# Patient Record
Sex: FEMALE | Race: WHITE | NOT HISPANIC OR LATINO | Employment: OTHER | URBAN - METROPOLITAN AREA
[De-identification: names, ages, dates, MRNs, and addresses within clinical notes are randomized per-mention and may not be internally consistent; named-entity substitution may affect disease eponyms.]

---

## 2017-01-04 ENCOUNTER — HOSPITAL ENCOUNTER (OUTPATIENT)
Dept: RADIOLOGY | Facility: MEDICAL CENTER | Age: 82
Discharge: HOME | End: 2017-01-04
Payer: MEDICARE

## 2017-01-04 ENCOUNTER — TRANSCRIBE ORDERS (OUTPATIENT)
Dept: ADMINISTRATIVE | Facility: HOSPITAL | Age: 82
End: 2017-01-04

## 2017-01-04 DIAGNOSIS — S79.912A TRAUMA LEFT HIP, INITIAL ENCOUNTER: ICD-10-CM

## 2017-01-04 DIAGNOSIS — R52 PAIN: ICD-10-CM

## 2017-01-04 DIAGNOSIS — S79.912A TRAUMA LEFT HIP, INITIAL ENCOUNTER: Primary | ICD-10-CM

## 2017-01-04 DIAGNOSIS — M89.8X5 PAIN IN FEMUR: ICD-10-CM

## 2017-01-04 PROCEDURE — 73552 X-RAY EXAM OF FEMUR 2/>: CPT

## 2017-01-04 PROCEDURE — 73502 X-RAY EXAM HIP UNI 2-3 VIEWS: CPT

## 2017-01-30 ENCOUNTER — ALLSCRIPTS OFFICE VISIT (OUTPATIENT)
Dept: OTHER | Facility: OTHER | Age: 82
End: 2017-01-30

## 2017-01-30 ENCOUNTER — LAB REQUISITION (OUTPATIENT)
Dept: LAB | Facility: HOSPITAL | Age: 82
End: 2017-01-30
Payer: MEDICARE

## 2017-01-30 ENCOUNTER — GENERIC CONVERSION - ENCOUNTER (OUTPATIENT)
Dept: OTHER | Facility: OTHER | Age: 82
End: 2017-01-30

## 2017-01-30 DIAGNOSIS — N39.0 URINARY TRACT INFECTION: ICD-10-CM

## 2017-01-30 LAB
BILIRUB UR QL STRIP: NEGATIVE
CLARITY UR: NORMAL
COLOR UR: YELLOW
GLUCOSE (HISTORICAL): NEGATIVE
HGB UR QL STRIP.AUTO: NORMAL
KETONES UR STRIP-MCNC: NEGATIVE MG/DL
LEUKOCYTE ESTERASE UR QL STRIP: NORMAL
NITRITE UR QL STRIP: NEGATIVE
PH UR STRIP.AUTO: 5 [PH]
PROT UR STRIP-MCNC: NORMAL MG/DL
SP GR UR STRIP.AUTO: 1.01
UROBILINOGEN UR QL STRIP.AUTO: 0.1

## 2017-01-30 PROCEDURE — 81003 URINALYSIS AUTO W/O SCOPE: CPT | Performed by: OBSTETRICS & GYNECOLOGY

## 2017-01-30 PROCEDURE — 87086 URINE CULTURE/COLONY COUNT: CPT | Performed by: OBSTETRICS & GYNECOLOGY

## 2017-01-31 LAB
BILIRUB UR QL STRIP: NEGATIVE
CLARITY UR: NORMAL
COLOR UR: YELLOW
GLUCOSE UR STRIP-MCNC: NEGATIVE MG/DL
HGB UR QL STRIP.AUTO: NEGATIVE
KETONES UR STRIP-MCNC: NEGATIVE MG/DL
LEUKOCYTE ESTERASE UR QL STRIP: NEGATIVE
NITRITE UR QL STRIP: NEGATIVE
PH UR STRIP.AUTO: 6 [PH] (ref 4.5–8)
PROT UR STRIP-MCNC: NEGATIVE MG/DL
SP GR UR STRIP.AUTO: 1.01 (ref 1–1.03)
UROBILINOGEN UR QL STRIP.AUTO: 0.2 E.U./DL

## 2017-02-01 LAB — BACTERIA UR CULT: NORMAL

## 2017-03-23 ENCOUNTER — ALLSCRIPTS OFFICE VISIT (OUTPATIENT)
Dept: OTHER | Facility: OTHER | Age: 82
End: 2017-03-23

## 2017-03-23 DIAGNOSIS — M85.80 OTHER SPECIFIED DISORDERS OF BONE DENSITY AND STRUCTURE, UNSPECIFIED SITE: ICD-10-CM

## 2017-03-29 ENCOUNTER — HOSPITAL ENCOUNTER (OUTPATIENT)
Dept: BONE DENSITY | Facility: MEDICAL CENTER | Age: 82
Discharge: HOME | End: 2017-03-29
Payer: MEDICARE

## 2017-03-29 DIAGNOSIS — M85.80 SENILE OSTEOPENIA: ICD-10-CM

## 2017-03-29 DIAGNOSIS — M85.80 OTHER SPECIFIED DISORDERS OF BONE DENSITY AND STRUCTURE, UNSPECIFIED SITE: ICD-10-CM

## 2017-03-29 DIAGNOSIS — M85.80 OSTEOPENIA: ICD-10-CM

## 2017-03-29 PROCEDURE — 77080 DXA BONE DENSITY AXIAL: CPT

## 2017-04-17 ENCOUNTER — GENERIC CONVERSION - ENCOUNTER (OUTPATIENT)
Dept: OTHER | Facility: OTHER | Age: 82
End: 2017-04-17

## 2017-08-02 ENCOUNTER — ALLSCRIPTS OFFICE VISIT (OUTPATIENT)
Dept: OTHER | Facility: OTHER | Age: 82
End: 2017-08-02

## 2017-11-02 ENCOUNTER — GENERIC CONVERSION - ENCOUNTER (OUTPATIENT)
Dept: OTHER | Facility: OTHER | Age: 82
End: 2017-11-02

## 2017-11-30 ENCOUNTER — ALLSCRIPTS OFFICE VISIT (OUTPATIENT)
Dept: OTHER | Facility: OTHER | Age: 82
End: 2017-11-30

## 2017-11-30 ENCOUNTER — GENERIC CONVERSION - ENCOUNTER (OUTPATIENT)
Dept: OTHER | Facility: OTHER | Age: 82
End: 2017-11-30

## 2017-11-30 DIAGNOSIS — I10 ESSENTIAL (PRIMARY) HYPERTENSION: ICD-10-CM

## 2017-11-30 DIAGNOSIS — R73.01 IMPAIRED FASTING GLUCOSE: ICD-10-CM

## 2017-11-30 DIAGNOSIS — E53.8 DEFICIENCY OF OTHER SPECIFIED B GROUP VITAMINS (CODE): ICD-10-CM

## 2017-11-30 DIAGNOSIS — E78.5 HYPERLIPIDEMIA: ICD-10-CM

## 2017-11-30 DIAGNOSIS — M17.10 PRIMARY OSTEOARTHRITIS OF ONE KNEE: ICD-10-CM

## 2017-11-30 DIAGNOSIS — E55.9 VITAMIN D DEFICIENCY: ICD-10-CM
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
## 2017-12-18 ENCOUNTER — GENERIC CONVERSION - ENCOUNTER (OUTPATIENT)
Dept: OBGYN CLINIC | Facility: MEDICAL CENTER | Age: 82
End: 2017-12-18

## 2017-12-18 ENCOUNTER — GENERIC CONVERSION - ENCOUNTER (OUTPATIENT)
Dept: FAMILY MEDICINE CLINIC | Facility: CLINIC | Age: 82
End: 2017-12-18

## 2018-01-13 VITALS
BODY MASS INDEX: 28.91 KG/M2 | DIASTOLIC BLOOD PRESSURE: 60 MMHG | SYSTOLIC BLOOD PRESSURE: 134 MMHG | HEIGHT: 62 IN | WEIGHT: 157.13 LBS

## 2018-01-13 NOTE — MISCELLANEOUS
Message  Message Free Text Note Form: called results of recent UA, spoke with       Signatures   Electronically signed by : Rosio Barboza DO; Feb  3 2017 10:04AM EST                       (Author)

## 2018-01-14 VITALS
HEIGHT: 62 IN | SYSTOLIC BLOOD PRESSURE: 122 MMHG | BODY MASS INDEX: 29.83 KG/M2 | WEIGHT: 162.13 LBS | DIASTOLIC BLOOD PRESSURE: 70 MMHG | TEMPERATURE: 97.5 F

## 2018-01-14 VITALS
BODY MASS INDEX: 28 KG/M2 | DIASTOLIC BLOOD PRESSURE: 82 MMHG | HEIGHT: 63 IN | WEIGHT: 158 LBS | SYSTOLIC BLOOD PRESSURE: 122 MMHG

## 2018-01-15 ENCOUNTER — ALLSCRIPTS OFFICE VISIT (OUTPATIENT)
Dept: OTHER | Facility: OTHER | Age: 83
End: 2018-01-15

## 2018-01-15 VITALS
DIASTOLIC BLOOD PRESSURE: 80 MMHG | SYSTOLIC BLOOD PRESSURE: 130 MMHG | WEIGHT: 159 LBS | BODY MASS INDEX: 29.26 KG/M2 | TEMPERATURE: 96.2 F | HEIGHT: 62 IN

## 2018-01-16 NOTE — PROGRESS NOTES
Assessment   1  Sinusitis (473 9) (J32 9)    Plan   Sinusitis    · Azithromycin 250 MG Oral Tablet; TAKE 2 TABLETS ON DAY 1 THEN TAKE 1    TABLET A DAY FOR 4 DAYS   · Follow Up if Not Better Evaluation and Treatment  Follow-up  Status: Complete  Done:    59KYQ3847 01:55PM   · Drink at least 6 glasses of water or juice a day ; Status:Complete;   Done: 25FAA5789    01:55PM    Discussion/Summary   Possible side effects of new medications were reviewed with the patient/guardian today  The treatment plan was reviewed with the patient/guardian  The patient/guardian understands and agrees with the treatment plan      Chief Complaint   Patient is being seen for a cold as stated  Patient c/o of eyes redness and seen her eye Dr this morning  He prescribed drop on this past Saturday  patient is coughing and sinus, postal nasal drip  patient has no other concerns  History of Present Illness   HPI: Pt  is here for sinus congestion, cough and pnd  pt did Have ocular issue and salt Dr Scotty Savage today  Patient placed on tobramycin eyedrops Which were started on Saturday  No fever noted  patient using Claritin daily  patient also using saline rinse  Review of Systems        Constitutional: as noted in HPI       ENT: as noted in HPI  Cardiovascular: no complaints of slow or fast heart rate, no chest pain, no palpitations, no leg claudication or lower extremity edema  Respiratory: as noted in HPI  Breasts: no complaints of breast pain, breast lump or nipple discharge  Gastrointestinal: no complaints of abdominal pain, no constipation, no nausea or diarrhea, no vomiting, no bloody stools  Genitourinary: no complaints of dysuria, no incontinence, no pelvic pain, no dysmenorrhea, no vaginal discharge or abnormal vaginal bleeding  Musculoskeletal: no complaints of arthralgia, no myalgia, no joint swelling or stiffness, no limb pain or swelling        Integumentary: no complaints of skin rash or lesion, no itching or dry skin, no skin wounds  Neurological: no complaints of headache, no confusion, no numbness or tingling, no dizziness or fainting  Active Problems   1  Accidental overdose (977 9,E858 9) (T50 901A)   2  Atrophic vaginitis (627 3) (N95 2)   3  Bacterial infection (041 9) (A49 9)   4  Bilateral leg edema (782 3) (R60 0)   5  Carpal tunnel syndrome of right wrist (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 0) (G56 01)   6  Cellulitis Of The Right Little Finger (681 00)   7  Chronic rhinitis (472 0) (J31 0)   8  Concussion without loss of consciousness, initial encounter (850 0) (S06 0X0A)   9  Dysuria (788 1) (R30 0)   10  Fatigue (780 79) (R53 83)   11  Flu vaccine need (V04 81) (Z23)   12  Genital lesion, female (629 89) (N94 9)   13  Glaucoma (365 9) (H40 9)   14  Head trauma (959 01) (S09 90XA)   15  Hyperlipidemia (272 4) (E78 5)   16  Hypertension (401 9) (I10)   17  Impaired fasting glucose (790 21) (R73 01)   18  Inclusion cyst (706 2) (L72 0)   19  Lower back pain (724 2) (M54 5)   20  Lower extremity edema (782 3) (R60 0)   21  Menopausal disorder (627 9) (N95 9)   22  Myalgia, traumatic (959 9) (T14 8XXA)   23  Nasal Cyst (478 19)   24  Need for pneumococcal vaccination (V03 82) (Z23)   25  Need for prophylactic antibiotic (V58 62) (Z79 2)   26  Need For Prophylactic Antibiotics (V07 8)   27  Need for revaccination (V05 9) (Z23)   28  Osteoarthritis of knee (715 36) (M17 10)   29  Osteopenia (733 90) (M85 80)   30  Sinusitis (473 9) (J32 9)   31  Swelling of labia (625 8) (N94 89)   32  Symptomatic menopausal or female climacteric states (627 2) (N95 1)   33  URI, acute (465 9) (J06 9)   34  Urinary tract infection (599 0) (N39 0)   35  Vaginal irritation (623 9) (N89 8)   36  Verrucous keratosis (702 8) (L82 1)   37  Vitamin B12 deficiency (266 2) (E53 8)   38  Vitamin D deficiency (268 9) (E55 9)    Past Medical History   1  Acute laryngitis (464 00) (J04 0)  Active Problems And Past Medical History Reviewed:     The active problems and past medical history were reviewed and updated today  Family History   Mother    1  No pertinent family history    Social History    · Being A Social Drinker   · Former smoker (J54 73) (N09 477)    Surgical History   1  Need For Prophylactic Antibiotics (V07 8)   2  History of Total Abd Hysterect W/ Bilat Ovary Remov & Enterocele Repair    Current Meds    1  Aspirin 81 MG TABS; TAKE 1 TABLET DAILY; Therapy: 35JAU5936 to (Marcella Crespo) Recorded   2  Azo-Cranberry 450 MG TABS; TAKE AS DIRECTED; Therapy: 56GFC1642 to Recorded   3  Celecoxib 200 MG Oral Capsule; TAKE ONE CAPSULE BY MOUTH DAILY WITH A     MEAL; Therapy: 50ESL2170 to (Evaluate:27Jun2018)  Requested for: 25Oie9704; Last     Rx:29Dec2017 Ordered   4  Centrum Silver Ultra Womens Oral Tablet; TAKE 1 TABLET DAILY; Therapy: 32YUG0529 to Recorded   5  Cephalexin 500 MG Oral Capsule; TAKE 2 CAPSULES 1/2 HOUR PRIOR TO     PROCEDURE TO PREVENT INFECTION; Therapy: 27BMR0912 to (Evaluate:51Ckd6660)  Requested for: 41PEW9755; Last     Rx:30Nov2017 Ordered   6  Claritin 10 MG Oral Capsule; Therapy: 81WSP2865 to Recorded   7  CoQ10 100 MG Oral Capsule; TAKE 1 CAPSULE DAILY as directed; Therapy: 49IFR6814 to Recorded   8  Ezetimibe-Simvastatin 10-20 MG Oral Tablet; take 1 tablet by mouth daily; Therapy: 43ZHF6322 to (Evaluate:30Jun2018)  Requested for: 94PFD9082; Last     Rx:01Jan2018 Ordered   9  Fish Oil 1000 MG Oral Capsule; TAKE 1 CAPSULE DAILY; Therapy: 91UVG5788 to Recorded   10  Lumigan 0 01 % Ophthalmic Solution; INSTILL 1 DROP INTO BOTH EYES ONCE DAILY      IN THE EVENING; Therapy: 00CYY4452 to (Evaluate:08Jan2015) Recorded   11  Metoprolol Succinate ER 50 MG Oral Tablet Extended Release 24 Hour; TAKE 1 TABLET      DAILY; Therapy: 88NVV9340 to (Mir Herrera)  Requested for: 31OKV6124; Last      Rx:05Jan2018 Ordered   12   Metoprolol Succinate ER 50 MG Oral Tablet Extended Release 24 Hour; TAKE 1 TABLET      ONCE DAILY; Therapy: 78VQH6936 to ([employer])  Requested for: 60NUL8295; Last      Rx:05Jan2018 Ordered   13  Metoprolol Succinate ER 50 MG Oral Tablet Extended Release 24 Hour; TAKE 1 TABLET      ONCE DAILY; Therapy: 96IXR7706 to ([employer])  Requested for: 09ODV5830; Last      Rx:05Jan2018 Ordered   14  Pazeo 0 7 % Ophthalmic Solution; INSTILL 1 DROP TO EACH EYE DAILY AS NEEDED; Therapy: 71QIL3451 to Recorded   15  Simbrinza 1-0 2 % Ophthalmic Suspension; 1 DROP 2 DAILY 1 IN AM, 1 IN PM, LEFT      EYE ONLY; Therapy: 67EOC1645 to Recorded   16  Systane Ultra 0 4-0 3 % Ophthalmic Solution; INSTILL 1 DROP INTO EACH EYE DAILY      AS NEEDED; Therapy: 43DAX5068 to Recorded   17  Toprol XL 50 MG Oral Tablet Extended Release 24 Hour; TAKE 1 TABLET ONCE DAILY; Therapy: 20ZEZ7350 to ([employer])  Requested for: 48QXB5459; Last      Rx:05Jan2018 Ordered     The medication list was reviewed and updated today  Allergies   1  Cleocin CAPS   2  Darvocet A500 TABS   3  Penicillins   4  Sulfa Drugs    Vitals    Recorded: 06LCJ7407 01:32PM   Temperature 98 4 F, Tympanic   Heart Rate 82, R Radial   Systolic 557, RLE, Sitting   Diastolic 70, RLE, Sitting   Height 5 ft 5 in   Weight 160 lb    BMI Calculated 26 63   BSA Calculated 1 8   O2 Saturation 96     Physical Exam        Constitutional      General appearance: No acute distress, well appearing and well nourished  Eyes      Conjunctiva and lids: No swelling, erythema or discharge  Pupils and irises: Equal, round and reactive to light  Ears, Nose, Mouth, and Throat      External inspection of ears and nose: Normal        Otoscopic examination: Tympanic membranes translucent with normal light reflex  Canals patent without erythema  Nasal mucosa, septum, and turbinates: Normal without edema or erythema  Oropharynx: Abnormal  -- pnd        Pulmonary      Respiratory effort: No increased work of breathing or signs of respiratory distress  Auscultation of lungs: Clear to auscultation  Cardiovascular      Palpation of heart: Normal PMI, no thrills  Auscultation of heart: Normal rate and rhythm, normal S1 and S2, without murmurs  Examination of extremities for edema and/or varicosities: Normal        Carotid pulses: Normal        Abdomen      Abdomen: Non-tender, no masses  Liver and spleen: No hepatomegaly or splenomegaly  Lymphatic      Palpation of lymph nodes in neck: No lymphadenopathy  Musculoskeletal      Gait and station: Normal        Digits and nails: Normal without clubbing or cyanosis  Inspection/palpation of joints, bones, and muscles: Abnormal  -- Osteoarthritic changes bilateral knees  Skin      Skin and subcutaneous tissue: Normal without rashes or lesions  Neurologic      Cranial nerves: Cranial nerves 2-12 intact  Reflexes: 2+ and symmetric  Sensation: No sensory loss         Psychiatric      Orientation to person, place, and time: Normal        Mood and affect: Normal           Future Appointments      Date/Time Provider Specialty Site   03/22/2018 08:45 AM Yuliana Bird DO [facility] [address] [facility] [place]     Signatures    Electronically signed by : Giulia Martínez DO; Edgardo 15 2018  1:55PM EST                       (Author)

## 2018-01-19 ENCOUNTER — GENERIC CONVERSION - ENCOUNTER (OUTPATIENT)
Dept: OTHER | Facility: OTHER | Age: 83
End: 2018-01-19

## 2018-01-23 VITALS
WEIGHT: 160 LBS | TEMPERATURE: 98.4 F | SYSTOLIC BLOOD PRESSURE: 132 MMHG | OXYGEN SATURATION: 96 % | HEART RATE: 82 BPM | HEIGHT: 65 IN | DIASTOLIC BLOOD PRESSURE: 70 MMHG | BODY MASS INDEX: 26.66 KG/M2

## 2018-01-24 VITALS
HEIGHT: 65 IN | DIASTOLIC BLOOD PRESSURE: 70 MMHG | BODY MASS INDEX: 26.33 KG/M2 | WEIGHT: 158 LBS | SYSTOLIC BLOOD PRESSURE: 126 MMHG | TEMPERATURE: 97.6 F

## 2018-03-07 NOTE — PROGRESS NOTES
History of Present Illness    Revaccination   Vaccine Information: Vaccine(s) Given (names): Pneumovax  Spoke with patient regarding vaccine out of temperature range  Action(s): Pt will be revaccinated  Appointment scheduled: [id number]  Other Information: Pt is aware and will revaccinate at her ov appt on 12/13/16  RG 12/12/16  Revaccination Completed: [id number]  Active Problems    1  Accidental overdose (977 9,E858 9) (T50 901A)   2  Atrophic vaginitis (627 3) (N95 2)   3  Bacterial infection (041 9) (A49 9)   4  Carpal tunnel syndrome of right wrist (354 0) (G56 01)   5  Cellulitis Of The Right Little Finger (681 00)   6  Chronic rhinitis (472 0) (J31 0)   7  Dysuria (788 1) (R30 0)   8  Fatigue (780 79) (R53 83)   9  Flu vaccine need (V04 81) (Z23)   10  Genital lesion, female (629 89) (N94 9)   11  Glaucoma (365 9) (H40 9)   12  Hyperlipidemia (272 4) (E78 5)   13  Hypertension (401 9) (I10)   14  Impaired fasting glucose (790 21) (R73 01)   15  Inclusion cyst (706 2) (L72 0)   16  Lower back pain (724 2) (M54 5)   17  Lower extremity edema (782 3) (R60 0)   18  Menopausal disorder (627 9) (N95 9)   19  Nasal Cyst (478 19)   20  Need for pneumococcal vaccination (V03 82) (Z23)   21  Need For Prophylactic Antibiotics (V07 8)   22  Need for revaccination (V05 9) (Z23)   23  Osteoarthritis of knee (715 36) (M17 9)   24  Osteopenia (733 90) (M85 80)   25  Sinusitis (473 9) (J32 9)   26  Swelling of labia (625 8) (N94 89)   27  Symptomatic menopausal or female climacteric states (627 2) (N95 1)   28  Upper respiratory infection (465 9) (J06 9)   29  Urinary tract infection (599 0) (N39 0)   30  Vaginal irritation (623 9) (N89 8)   31  Verrucous keratosis (702 8) (L82 1)   32  Vitamin B12 deficiency (266 2) (E53 8)   33   Vitamin D deficiency (268 9) (E55 9)    Immunizations  Influenza --- Conway Boas: [id number] Rakers: 29-Sep-2015; Series3: 13-Sep-2016   PCV --- Series1: 02-Jul-2013   PPSV --- Conway Boas: 11-Dec-2014   Td/DT --- Verl Nones: 25-Jan-2013     Current Meds   1  Aspirin 81 MG TABS; TAKE 1 TABLET DAILY   2  Azithromycin 250 MG Oral Tablet; TAKE 2 TABLETS ON DAY 1 THEN TAKE 1 TABLET A   DAY FOR 4 DAYS   3  Azo-Cranberry 450 MG TABS; TAKE AS DIRECTED   4  Benicar HCT 40-12 5 MG Oral Tablet; Take 1 tablet by mouth daily   5  Celecoxib 200 MG Oral Capsule; TAKE ONE CAPSULE BY MOUTH DAILY WITH A MEAL   6  Centrum Silver Ultra Womens Oral Tablet; TAKE 1 TABLET DAILY   7  Claritin 10 MG Oral Capsule   8  CoQ10 100 MG Oral Capsule; TAKE 1 CAPSULE DAILY as directed   9  Fish Oil 1000 MG Oral Capsule; TAKE 1 CAPSULE DAILY   10  Lotrel 5-20 MG Oral Capsule; take one capsule by mouth daily   11  Lumigan 0 01 % Ophthalmic Solution; INSTILL 1 DROP INTO BOTH EYES ONCE DAILY    IN THE EVENING   12  Montelukast Sodium 10 MG Oral Tablet   13  Pazeo 0 7 % Ophthalmic Solution; INSTILL 1 DROP TO EACH EYE DAILY AS NEEDED   14  Premarin 0 3 MG Oral Tablet; TAKE 1 TABLET BY MOUTH ONCE DAILY 5 DAYS PER    WEEK   15  Simbrinza 1-0 2 % Ophthalmic Suspension; INSTILL 1 DROP INTO LT EYE TWICE    DAILY, 5 MINS AFTER ALPHAGAN   16  Systane Ultra 0 4-0 3 % Ophthalmic Solution; INSTILL 1 DROP INTO EACH EYE DAILY    AS NEEDED   17  Tenormin 25 MG Oral Tablet; TAKE THREE TABS DAILY   18  Vytorin 10-20 MG Oral Tablet; take 1 tablet by mouth daily    Allergies    1  Cleocin CAPS   2  Darvocet A500 TABS   3  Penicillins   4   Sulfa Drugs    Future Appointments    Date/Time Provider Specialty Site   12/13/2016 08:15 AM Alexa Jernigan DO Family Medicine  [facility]     Signatures   Electronically signed by : Shelia Marrufo OM; Dec 12 2016  3:32PM EST                       (Author)    Electronically signed by : Jose Gomez DO; Dec 20 2016  9:28AM EST                       (Author)

## 2018-03-13 ENCOUNTER — TRANSCRIBE ORDERS (OUTPATIENT)
Dept: ADMINISTRATIVE | Facility: HOSPITAL | Age: 83
End: 2018-03-13

## 2018-03-13 ENCOUNTER — APPOINTMENT (OUTPATIENT)
Dept: LAB | Facility: MEDICAL CENTER | Age: 83
End: 2018-03-13
Payer: MEDICARE

## 2018-03-13 DIAGNOSIS — E78.5 HYPERLIPIDEMIA: ICD-10-CM

## 2018-03-13 DIAGNOSIS — E55.9 VITAMIN D DEFICIENCY: ICD-10-CM

## 2018-03-13 DIAGNOSIS — I10 ESSENTIAL (PRIMARY) HYPERTENSION: ICD-10-CM

## 2018-03-13 DIAGNOSIS — E53.8 DEFICIENCY OF OTHER SPECIFIED B GROUP VITAMINS (CODE): ICD-10-CM

## 2018-03-13 DIAGNOSIS — R73.01 IMPAIRED FASTING GLUCOSE: ICD-10-CM

## 2018-03-13 DIAGNOSIS — M17.10 PRIMARY OSTEOARTHRITIS OF ONE KNEE: ICD-10-CM

## 2018-03-13 LAB
25(OH)D3 SERPL-MCNC: 31.7 NG/ML (ref 30–100)
ALBUMIN SERPL BCP-MCNC: 3.8 G/DL (ref 3.5–5)
ALP SERPL-CCNC: 61 U/L (ref 46–116)
ALT SERPL W P-5'-P-CCNC: 43 U/L (ref 12–78)
ANION GAP SERPL CALCULATED.3IONS-SCNC: 8 MMOL/L (ref 4–13)
AST SERPL W P-5'-P-CCNC: 33 U/L (ref 5–45)
BASOPHILS # BLD AUTO: 0.02 THOUSANDS/ΜL (ref 0–0.1)
BASOPHILS NFR BLD AUTO: 0 % (ref 0–1)
BILIRUB SERPL-MCNC: 0.67 MG/DL (ref 0.2–1)
BUN SERPL-MCNC: 29 MG/DL (ref 5–25)
CALCIUM SERPL-MCNC: 9.2 MG/DL (ref 8.3–10.1)
CHLORIDE SERPL-SCNC: 101 MMOL/L (ref 100–108)
CHOLEST SERPL-MCNC: 116 MG/DL (ref 50–200)
CO2 SERPL-SCNC: 28 MMOL/L (ref 21–32)
CREAT SERPL-MCNC: 0.96 MG/DL (ref 0.6–1.3)
CREAT UR-MCNC: 140 MG/DL
EOSINOPHIL # BLD AUTO: 0.16 THOUSAND/ΜL (ref 0–0.61)
EOSINOPHIL NFR BLD AUTO: 3 % (ref 0–6)
ERYTHROCYTE [DISTWIDTH] IN BLOOD BY AUTOMATED COUNT: 12.9 % (ref 11.6–15.1)
EST. AVERAGE GLUCOSE BLD GHB EST-MCNC: 137 MG/DL
GFR SERPL CREATININE-BSD FRML MDRD: 56 ML/MIN/1.73SQ M
GLUCOSE P FAST SERPL-MCNC: 139 MG/DL (ref 65–99)
HBA1C MFR BLD: 6.4 % (ref 4.2–6.3)
HCT VFR BLD AUTO: 37.6 % (ref 34.8–46.1)
HDLC SERPL-MCNC: 45 MG/DL (ref 40–60)
HGB BLD-MCNC: 12.5 G/DL (ref 11.5–15.4)
LDLC SERPL CALC-MCNC: 38 MG/DL (ref 0–100)
LYMPHOCYTES # BLD AUTO: 1.64 THOUSANDS/ΜL (ref 0.6–4.47)
LYMPHOCYTES NFR BLD AUTO: 29 % (ref 14–44)
MCH RBC QN AUTO: 30.4 PG (ref 26.8–34.3)
MCHC RBC AUTO-ENTMCNC: 33.2 G/DL (ref 31.4–37.4)
MCV RBC AUTO: 92 FL (ref 82–98)
MICROALBUMIN UR-MCNC: 8.5 MG/L (ref 0–20)
MICROALBUMIN/CREAT 24H UR: 6 MG/G CREATININE (ref 0–30)
MONOCYTES # BLD AUTO: 0.59 THOUSAND/ΜL (ref 0.17–1.22)
MONOCYTES NFR BLD AUTO: 11 % (ref 4–12)
NEUTROPHILS # BLD AUTO: 3.19 THOUSANDS/ΜL (ref 1.85–7.62)
NEUTS SEG NFR BLD AUTO: 57 % (ref 43–75)
NRBC BLD AUTO-RTO: 0 /100 WBCS
PLATELET # BLD AUTO: 202 THOUSANDS/UL (ref 149–390)
PMV BLD AUTO: 12.8 FL (ref 8.9–12.7)
POTASSIUM SERPL-SCNC: 3.9 MMOL/L (ref 3.5–5.3)
PROT SERPL-MCNC: 7 G/DL (ref 6.4–8.2)
RBC # BLD AUTO: 4.11 MILLION/UL (ref 3.81–5.12)
SODIUM SERPL-SCNC: 137 MMOL/L (ref 136–145)
T4 FREE SERPL-MCNC: 1.04 NG/DL (ref 0.76–1.46)
TRIGL SERPL-MCNC: 167 MG/DL
TSH SERPL DL<=0.05 MIU/L-ACNC: 5.51 UIU/ML (ref 0.36–3.74)
VIT B12 SERPL-MCNC: 835 PG/ML (ref 100–900)
WBC # BLD AUTO: 5.63 THOUSAND/UL (ref 4.31–10.16)

## 2018-03-13 PROCEDURE — 80061 LIPID PANEL: CPT

## 2018-03-13 PROCEDURE — 84439 ASSAY OF FREE THYROXINE: CPT

## 2018-03-13 PROCEDURE — 82043 UR ALBUMIN QUANTITATIVE: CPT

## 2018-03-13 PROCEDURE — 85025 COMPLETE CBC W/AUTO DIFF WBC: CPT

## 2018-03-13 PROCEDURE — 82607 VITAMIN B-12: CPT

## 2018-03-13 PROCEDURE — 80053 COMPREHEN METABOLIC PANEL: CPT

## 2018-03-13 PROCEDURE — 36415 COLL VENOUS BLD VENIPUNCTURE: CPT

## 2018-03-13 PROCEDURE — 82306 VITAMIN D 25 HYDROXY: CPT

## 2018-03-13 PROCEDURE — 84443 ASSAY THYROID STIM HORMONE: CPT

## 2018-03-13 PROCEDURE — 82570 ASSAY OF URINE CREATININE: CPT

## 2018-03-13 PROCEDURE — 83036 HEMOGLOBIN GLYCOSYLATED A1C: CPT

## 2018-03-14 ENCOUNTER — TELEPHONE (OUTPATIENT)
Dept: FAMILY MEDICINE CLINIC | Facility: CLINIC | Age: 83
End: 2018-03-14

## 2018-03-14 NOTE — TELEPHONE ENCOUNTER
----- Message from Vane Batista DO sent at 3/14/2018 11:49 AM EDT -----  Call patient  Labs reviewed  Patient with impaired fasting glucose the and elevated TSH  Start Synthroid 25 mcg daily in the morning on empty stomach number 30 with 2 refills  Patient off TSH redrawn and 2 months

## 2018-03-15 DIAGNOSIS — R79.89 ABNORMAL TSH: Primary | ICD-10-CM

## 2018-03-15 RX ORDER — CHLORAL HYDRATE 500 MG
1 CAPSULE ORAL DAILY
COMMUNITY
Start: 2014-07-15

## 2018-03-15 RX ORDER — ATENOLOL 25 MG/1
1 TABLET ORAL 3 TIMES DAILY
COMMUNITY
Start: 2012-03-27 | End: 2018-03-20

## 2018-03-15 RX ORDER — OLMESARTAN MEDOXOMIL AND HYDROCHLOROTHIAZIDE 40/12.5 40; 12.5 MG/1; MG/1
1 TABLET ORAL DAILY
COMMUNITY
Start: 2012-03-27 | End: 2018-09-28 | Stop reason: SDUPTHER

## 2018-03-15 RX ORDER — LORATADINE 10 MG/1
CAPSULE, LIQUID FILLED ORAL
COMMUNITY
Start: 2014-05-16

## 2018-03-15 RX ORDER — METOPROLOL SUCCINATE 50 MG/1
1 TABLET, EXTENDED RELEASE ORAL DAILY
COMMUNITY
Start: 2018-01-05 | End: 2018-09-24 | Stop reason: SDUPTHER

## 2018-03-15 RX ORDER — EZETIMIBE AND SIMVASTATIN 10; 20 MG/1; MG/1
1 TABLET ORAL DAILY
COMMUNITY
Start: 2012-03-27 | End: 2018-07-02 | Stop reason: SDUPTHER

## 2018-03-15 RX ORDER — CELECOXIB 200 MG/1
CAPSULE ORAL
COMMUNITY
Start: 2011-06-23 | End: 2018-06-25 | Stop reason: SDUPTHER

## 2018-03-15 RX ORDER — LEVOTHYROXINE SODIUM 0.03 MG/1
25 TABLET ORAL DAILY
Qty: 30 TABLET | Refills: 2 | Status: SHIPPED | OUTPATIENT
Start: 2018-03-15 | End: 2018-06-04 | Stop reason: SDUPTHER

## 2018-03-15 RX ORDER — VITAMIN B COMPLEX
1 TABLET ORAL DAILY
COMMUNITY
Start: 2013-01-08

## 2018-03-15 RX ORDER — AMLODIPINE BESYLATE AND BENAZEPRIL HYDROCHLORIDE 5; 20 MG/1; MG/1
1 CAPSULE ORAL DAILY
COMMUNITY
Start: 2012-03-27 | End: 2018-06-29 | Stop reason: SDUPTHER

## 2018-03-15 NOTE — TELEPHONE ENCOUNTER
----- Message from Thaddeus Shaffer DO sent at 3/14/2018 11:49 AM EDT -----  Call patient  Labs reviewed  Patient with impaired fasting glucose the and elevated TSH  Start Synthroid 25 mcg daily in the morning on empty stomach number 30 with 2 refills  Patient off TSH redrawn and 2 months

## 2018-03-20 ENCOUNTER — OFFICE VISIT (OUTPATIENT)
Dept: FAMILY MEDICINE CLINIC | Facility: CLINIC | Age: 83
End: 2018-03-20
Payer: MEDICARE

## 2018-03-20 VITALS
WEIGHT: 160.4 LBS | SYSTOLIC BLOOD PRESSURE: 130 MMHG | HEIGHT: 55 IN | DIASTOLIC BLOOD PRESSURE: 70 MMHG | BODY MASS INDEX: 37.12 KG/M2

## 2018-03-20 DIAGNOSIS — E03.8 HYPOTHYROIDISM DUE TO HASHIMOTO'S THYROIDITIS: ICD-10-CM

## 2018-03-20 DIAGNOSIS — E06.3 HYPOTHYROIDISM DUE TO HASHIMOTO'S THYROIDITIS: ICD-10-CM

## 2018-03-20 DIAGNOSIS — M85.80 OSTEOPENIA, UNSPECIFIED LOCATION: ICD-10-CM

## 2018-03-20 DIAGNOSIS — E53.8 VITAMIN B12 DEFICIENCY: ICD-10-CM

## 2018-03-20 DIAGNOSIS — I10 ESSENTIAL HYPERTENSION: Primary | ICD-10-CM

## 2018-03-20 DIAGNOSIS — E78.2 MIXED HYPERLIPIDEMIA: ICD-10-CM

## 2018-03-20 DIAGNOSIS — R73.01 IMPAIRED FASTING GLUCOSE: ICD-10-CM

## 2018-03-20 DIAGNOSIS — E55.9 VITAMIN D DEFICIENCY: ICD-10-CM

## 2018-03-20 PROBLEM — R60.0 BILATERAL LEG EDEMA: Status: ACTIVE | Noted: 2017-08-02

## 2018-03-20 PROCEDURE — 99214 OFFICE O/P EST MOD 30 MIN: CPT | Performed by: FAMILY MEDICINE

## 2018-03-20 NOTE — PROGRESS NOTES
Assessment/Plan:   labs   Discussed with patient  Patient will continue with current regimen for all conditions  Patient off TSH redrawn in the next 6 weeks  Patient will continue modified diet regarding impaired fasting glucose  All other medications will be continued for current issues such as hyperlipidemia impaired fasting glucose hypertension  Vitamin B12 and vitamin-D stable overall  No problem-specific Assessment & Plan notes found for this encounter  Diagnoses and all orders for this visit:    Essential hypertension    Mixed hyperlipidemia    Impaired fasting glucose    Osteopenia, unspecified location    Vitamin B12 deficiency    Vitamin D deficiency    Other orders  -     aspirin 81 MG tablet; Take 1 tablet by mouth daily  -     Cranberry 450 MG TABS; Take by mouth  -     celecoxib (CeleBREX) 200 mg capsule; Take by mouth  -     Multiple Vitamins-Minerals (CENTRUM SILVER ULTRA WOMENS PO); Take 1 tablet by mouth daily  -     Loratadine (CLARITIN) 10 MG CAPS; Take by mouth  -     Coenzyme Q10 (COQ10) 100 MG CAPS; Take 1 capsule by mouth daily  -     ezetimibe-simvastatin (VYTORIN) 10-20 mg per tablet; Take 1 tablet by mouth daily  -     Omega-3 Fatty Acids (FISH OIL) 1,000 mg; Take 1 capsule by mouth daily  -     amLODIPine-benazepril (LOTREL) 5-20 MG per capsule; Take 1 capsule by mouth daily  -     bimatoprost (LUMIGAN) 0 01 % ophthalmic drops; Apply 1 drop to eye Daily  -     metoprolol succinate (TOPROL-XL) 50 mg 24 hr tablet; Take 1 tablet by mouth daily  -     olmesartan-hydrochlorothiazide (BENICAR HCT) 40-12 5 MG per tablet; Take 1 tablet by mouth daily  -     Olopatadine HCl (PAZEO) 0 7 % SOLN; Apply to eye  -     conjugated estrogens (PREMARIN) 0 3 mg tablet; Take by mouth  -     Brinzolamide-Brimonidine (SIMBRINZA) 1-0 2 % SUSP; Apply to eye  -     polyethylene glycol-propylene glycol (SYSTANE ULTRA) 0 4-0 3 %; Apply to eye  -     Discontinue: atenolol (TENORMIN) 25 mg tablet;  Take 1 tablet by mouth 3 (three) times a day          Subjective:      Patient ID: Ritu Montgomery is a 82 y o  female  Patient is here for follow-up regarding hypertension, hyperlipidemia, impaired fasting glucose and osteopenia  Patient also status post having laboratory studies with elevated TSH  Patient started on Synthroid recently  Patient had DEXA scan roughly 1 year ago which was reviewed  No significant change with vision  Patient with history of glaucoma  No chest pain or shortness of breath  Normal urination and defecation  Patient with trace lower extremity edema    All other review systems negative        The following portions of the patient's history were reviewed and updated as appropriate: allergies, current medications, past family history, past medical history, past social history, past surgical history and problem list     Review of Systems      Objective:      /70 (BP Location: Left arm, Patient Position: Sitting, Cuff Size: Standard)   Ht 1' 7 81" (0 503 m)   Wt 72 8 kg (160 lb 6 4 oz)   LMP  (LMP Unknown)    31 kg/m²          Physical Exam

## 2018-06-04 ENCOUNTER — APPOINTMENT (OUTPATIENT)
Dept: LAB | Facility: MEDICAL CENTER | Age: 83
End: 2018-06-04
Payer: MEDICARE

## 2018-06-04 DIAGNOSIS — E03.8 HYPOTHYROIDISM DUE TO HASHIMOTO'S THYROIDITIS: ICD-10-CM

## 2018-06-04 DIAGNOSIS — R79.89 ABNORMAL TSH: ICD-10-CM

## 2018-06-04 DIAGNOSIS — E06.3 HYPOTHYROIDISM DUE TO HASHIMOTO'S THYROIDITIS: ICD-10-CM

## 2018-06-04 LAB — TSH SERPL DL<=0.05 MIU/L-ACNC: 3.33 UIU/ML (ref 0.36–3.74)

## 2018-06-04 PROCEDURE — 84443 ASSAY THYROID STIM HORMONE: CPT

## 2018-06-04 PROCEDURE — 36415 COLL VENOUS BLD VENIPUNCTURE: CPT

## 2018-06-04 RX ORDER — LEVOTHYROXINE SODIUM 25 MCG
25 TABLET ORAL DAILY
Qty: 90 TABLET | Refills: 1 | Status: SHIPPED | OUTPATIENT
Start: 2018-06-04 | End: 2018-11-06 | Stop reason: SDUPTHER

## 2018-06-25 DIAGNOSIS — M19.90 ARTHRITIS: Primary | ICD-10-CM

## 2018-06-29 DIAGNOSIS — I10 ESSENTIAL HYPERTENSION: Primary | ICD-10-CM

## 2018-06-29 RX ORDER — AMLODIPINE BESYLATE/BENAZEPRIL 5 MG-20 MG
CAPSULE ORAL
Qty: 90 CAPSULE | Refills: 1 | Status: SHIPPED | OUTPATIENT
Start: 2018-06-29 | End: 2018-08-26 | Stop reason: SDUPTHER

## 2018-07-02 DIAGNOSIS — E78.2 MIXED HYPERLIPIDEMIA: Primary | ICD-10-CM

## 2018-07-02 RX ORDER — EZETIMIBE/SIMVASTATIN 10 MG-20MG
1 TABLET ORAL DAILY
Qty: 90 TABLET | Refills: 1 | Status: SHIPPED | OUTPATIENT
Start: 2018-07-02 | End: 2018-12-11 | Stop reason: SDUPTHER

## 2018-07-03 ENCOUNTER — OFFICE VISIT (OUTPATIENT)
Dept: FAMILY MEDICINE CLINIC | Facility: CLINIC | Age: 83
End: 2018-07-03
Payer: MEDICARE

## 2018-07-03 VITALS
HEIGHT: 62 IN | RESPIRATION RATE: 16 BRPM | HEART RATE: 87 BPM | WEIGHT: 160 LBS | BODY MASS INDEX: 29.44 KG/M2 | SYSTOLIC BLOOD PRESSURE: 128 MMHG | DIASTOLIC BLOOD PRESSURE: 72 MMHG

## 2018-07-03 DIAGNOSIS — E03.8 HYPOTHYROIDISM DUE TO HASHIMOTO'S THYROIDITIS: ICD-10-CM

## 2018-07-03 DIAGNOSIS — R73.01 IMPAIRED FASTING GLUCOSE: ICD-10-CM

## 2018-07-03 DIAGNOSIS — E78.2 MIXED HYPERLIPIDEMIA: ICD-10-CM

## 2018-07-03 DIAGNOSIS — E06.3 HYPOTHYROIDISM DUE TO HASHIMOTO'S THYROIDITIS: ICD-10-CM

## 2018-07-03 DIAGNOSIS — I10 ESSENTIAL HYPERTENSION: Primary | ICD-10-CM

## 2018-07-03 PROCEDURE — 99214 OFFICE O/P EST MOD 30 MIN: CPT | Performed by: FAMILY MEDICINE

## 2018-07-03 NOTE — PROGRESS NOTES
Assessment/Plan:    Blood pressure stable overall  Hypothyroidism stable  Hyperlipidemia stable  Impaired fasting glucose stable  Meds will be sent in automatically  Labs will be ordered at follow-up  Diagnoses and all orders for this visit:    Essential hypertension    Mixed hyperlipidemia    Hypothyroidism due to Hashimoto's thyroiditis    Impaired fasting glucose    Other orders  -     SHINGRIX 50 MCG SUSR; TO BE ADMINISTERED BY PHARMACIST FOR IMMUNIZATION          Subjective:      Patient ID: Anni Paula is a 82 y o  female  Patient here to follow-up on hypothyroidism hypertension hyperlipidemia  Fasting glucose  Patient without any chest pain shortness of breath headache or dizziness  Normal urination and defecation  Patient does have a skin lesion  This patient has noticed some edema due to heat  All review systems negative  The following portions of the patient's history were reviewed and updated as appropriate: allergies, current medications, past family history, past medical history, past social history, past surgical history and problem list     Review of Systems   Constitutional: Negative  HENT: Negative  Eyes: Negative  Respiratory: Negative  Cardiovascular: Negative  Gastrointestinal: Negative  Endocrine: Negative  Genitourinary: Negative  Musculoskeletal: Negative  Skin: Positive for wound  Allergic/Immunologic: Negative  Neurological: Negative  Hematological: Negative  Psychiatric/Behavioral: Negative  Objective:      /72 (BP Location: Right arm, Patient Position: Sitting, Cuff Size: Adult)   Pulse 87   Resp 16   Ht 5' 2" (1 575 m)   Wt 72 6 kg (160 lb)   LMP  (LMP Unknown)   BMI 29 26 kg/m²          Physical Exam   Constitutional: She is oriented to person, place, and time  She appears well-developed and well-nourished  No distress  HENT:   Head: Normocephalic     Right Ear: External ear normal    Left Ear: External ear normal    Mouth/Throat: Oropharynx is clear and moist  No oropharyngeal exudate  Eyes: EOM are normal  Pupils are equal, round, and reactive to light  Right eye exhibits no discharge  Left eye exhibits no discharge  No scleral icterus  Neck: Normal range of motion  Neck supple  No thyromegaly present  Cardiovascular: Normal rate, regular rhythm, normal heart sounds and intact distal pulses  Exam reveals no gallop and no friction rub  No murmur heard  Pulmonary/Chest: Effort normal and breath sounds normal  No respiratory distress  She has no wheezes  She has no rales  She exhibits no tenderness  Abdominal: Soft  Bowel sounds are normal  She exhibits no distension  There is no tenderness  There is no rebound and no guarding  Musculoskeletal: Normal range of motion  She exhibits edema  She exhibits no tenderness  The trace lower extremity edema  Lymphadenopathy:     She has no cervical adenopathy  Neurological: She is oriented to person, place, and time  No cranial nerve deficit  She exhibits normal muscle tone  Coordination normal    Skin: Skin is warm and dry  No rash noted  She is not diaphoretic  No erythema  No pallor  Psychiatric: She has a normal mood and affect  Her behavior is normal  Judgment and thought content normal    Nursing note and vitals reviewed

## 2018-08-26 DIAGNOSIS — I10 ESSENTIAL HYPERTENSION: ICD-10-CM

## 2018-08-27 RX ORDER — AMLODIPINE BESYLATE/BENAZEPRIL 5 MG-20 MG
CAPSULE ORAL
Qty: 90 CAPSULE | Refills: 1 | Status: SHIPPED | OUTPATIENT
Start: 2018-08-27 | End: 2019-03-22 | Stop reason: SDUPTHER

## 2018-08-31 DIAGNOSIS — R23.2 HOT FLASHES: Primary | ICD-10-CM

## 2018-08-31 RX ORDER — CONJUGATED ESTROGENS 0.3 MG/1
TABLET, FILM COATED ORAL
Qty: 100 TABLET | Refills: 0 | Status: SHIPPED | OUTPATIENT
Start: 2018-08-31 | End: 2018-11-23 | Stop reason: SDUPTHER

## 2018-09-24 DIAGNOSIS — I10 ESSENTIAL HYPERTENSION: Primary | ICD-10-CM

## 2018-09-24 RX ORDER — METOPROLOL SUCCINATE 50 MG
TABLET, EXTENDED RELEASE 24 HR ORAL
Qty: 90 TABLET | Refills: 1 | Status: SHIPPED | OUTPATIENT
Start: 2018-09-24 | End: 2019-03-15 | Stop reason: SDUPTHER

## 2018-09-28 DIAGNOSIS — I10 ESSENTIAL HYPERTENSION: Primary | ICD-10-CM

## 2018-10-02 RX ORDER — OLMESARTAN/HYDROCHLOROTHIAZIDE 40-12.5 MG
1 TABLET ORAL DAILY
Qty: 90 TABLET | Refills: 0 | Status: SHIPPED | OUTPATIENT
Start: 2018-10-02 | End: 2019-04-15 | Stop reason: SDUPTHER

## 2018-11-06 DIAGNOSIS — R79.89 ABNORMAL TSH: ICD-10-CM

## 2018-11-06 RX ORDER — LEVOTHYROXINE SODIUM 25 MCG
TABLET ORAL
Qty: 90 TABLET | Refills: 1 | Status: SHIPPED | OUTPATIENT
Start: 2018-11-06 | End: 2019-05-02 | Stop reason: SDUPTHER

## 2018-11-23 DIAGNOSIS — R23.2 HOT FLASHES: ICD-10-CM

## 2018-11-23 RX ORDER — CONJUGATED ESTROGENS 0.3 MG/1
TABLET, FILM COATED ORAL
Qty: 100 TABLET | Refills: 0 | Status: SHIPPED | OUTPATIENT
Start: 2018-11-23 | End: 2019-02-17 | Stop reason: SDUPTHER

## 2018-11-26 ENCOUNTER — OFFICE VISIT (OUTPATIENT)
Dept: FAMILY MEDICINE CLINIC | Facility: CLINIC | Age: 83
End: 2018-11-26
Payer: MEDICARE

## 2018-11-26 VITALS
TEMPERATURE: 97.7 F | DIASTOLIC BLOOD PRESSURE: 78 MMHG | SYSTOLIC BLOOD PRESSURE: 130 MMHG | HEIGHT: 62 IN | BODY MASS INDEX: 28.71 KG/M2 | WEIGHT: 156 LBS

## 2018-11-26 DIAGNOSIS — I10 ESSENTIAL HYPERTENSION: ICD-10-CM

## 2018-11-26 DIAGNOSIS — E78.2 MIXED HYPERLIPIDEMIA: ICD-10-CM

## 2018-11-26 DIAGNOSIS — E03.8 HYPOTHYROIDISM DUE TO HASHIMOTO'S THYROIDITIS: Primary | ICD-10-CM

## 2018-11-26 DIAGNOSIS — E06.3 HYPOTHYROIDISM DUE TO HASHIMOTO'S THYROIDITIS: Primary | ICD-10-CM

## 2018-11-26 DIAGNOSIS — R73.01 IMPAIRED FASTING GLUCOSE: ICD-10-CM

## 2018-11-26 PROCEDURE — 99214 OFFICE O/P EST MOD 30 MIN: CPT | Performed by: FAMILY MEDICINE

## 2018-11-26 NOTE — PROGRESS NOTES
Assessment/Plan:  Patient up-to-date with showing Maldonado and flu shot  A chronic conditions such as hypothyroidism hypertension hyperlipidemia and impaired fasting glucose stable at this time  Patient did lose weight  Patient will continue modify diet  Follow-up in 4 months  Refills will be given as needed  Diagnoses and all orders for this visit:    Hypothyroidism due to Hashimoto's thyroiditis    Impaired fasting glucose  -     CBC and differential; Future  -     Comprehensive metabolic panel; Future  -     Hemoglobin A1C; Future  -     Lipid panel; Future  -     Microalbumin / creatinine urine ratio  -     TSH, 3rd generation with Free T4 reflex; Future    Essential hypertension    Mixed hyperlipidemia          Subjective:      Patient ID: Scout Humphreys is a 82 y o  female  Patient follow-up on impaired fasting glucose hypertension hyperlipidemia hypothyroidism  Patient feeling well this time without any significant complaints other than having ongoing chronic knee pain  No edema chest pain shortness of breath or problems urinating or defecating  The patient up-to-date with flu shot  All review systems negative        The following portions of the patient's history were reviewed and updated as appropriate: allergies, current medications, past family history, past medical history, past social history, past surgical history and problem list     Review of Systems   Constitutional: Negative  HENT: Negative  Eyes: Negative  Respiratory: Negative  Cardiovascular: Negative  Gastrointestinal: Negative  Endocrine: Negative  Genitourinary: Negative  Musculoskeletal: Positive for arthralgias and gait problem  Skin: Negative  Allergic/Immunologic: Negative  Hematological: Negative  Psychiatric/Behavioral: Negative            Objective:      /78 (BP Location: Right arm, Patient Position: Sitting, Cuff Size: Standard)   Temp 97 7 °F (36 5 °C) (Tympanic)   Ht 5' 2" (1 575 m)   Wt 70 8 kg (156 lb)   LMP  (LMP Unknown)   BMI 28 53 kg/m²          Physical Exam   Constitutional: She is oriented to person, place, and time  She appears well-developed and well-nourished  No distress  HENT:   Head: Normocephalic  Right Ear: External ear normal    Left Ear: External ear normal    Mouth/Throat: Oropharynx is clear and moist  No oropharyngeal exudate  Eyes: Pupils are equal, round, and reactive to light  EOM are normal  Right eye exhibits no discharge  Left eye exhibits no discharge  No scleral icterus  Neck: Normal range of motion  Neck supple  No thyromegaly present  Cardiovascular: Normal rate, regular rhythm, normal heart sounds and intact distal pulses  Exam reveals no gallop and no friction rub  No murmur heard  Pulmonary/Chest: Effort normal and breath sounds normal  No respiratory distress  She has no wheezes  She has no rales  She exhibits no tenderness  Abdominal: Soft  Bowel sounds are normal  She exhibits no distension  There is no tenderness  There is no rebound and no guarding  Musculoskeletal: Normal range of motion  She exhibits edema and tenderness  Trace edema bilateral lower extremities   Lymphadenopathy:     She has no cervical adenopathy  Neurological: She is oriented to person, place, and time  No cranial nerve deficit  She exhibits normal muscle tone  Coordination normal    Skin: Skin is warm and dry  No rash noted  She is not diaphoretic  No erythema  No pallor  Psychiatric: She has a normal mood and affect  Her behavior is normal  Judgment and thought content normal    Nursing note and vitals reviewed

## 2018-12-11 DIAGNOSIS — E78.2 MIXED HYPERLIPIDEMIA: ICD-10-CM

## 2018-12-11 RX ORDER — EZETIMIBE/SIMVASTATIN 10 MG-20MG
TABLET ORAL
Qty: 90 TABLET | Refills: 1 | Status: SHIPPED | OUTPATIENT
Start: 2018-12-11 | End: 2019-06-10 | Stop reason: SDUPTHER

## 2018-12-18 DIAGNOSIS — M19.90 ARTHRITIS: ICD-10-CM

## 2019-01-25 ENCOUNTER — OFFICE VISIT (OUTPATIENT)
Dept: FAMILY MEDICINE CLINIC | Facility: CLINIC | Age: 84
End: 2019-01-25
Payer: MEDICARE

## 2019-01-25 VITALS
HEART RATE: 89 BPM | SYSTOLIC BLOOD PRESSURE: 138 MMHG | DIASTOLIC BLOOD PRESSURE: 64 MMHG | BODY MASS INDEX: 29.19 KG/M2 | WEIGHT: 158.6 LBS | TEMPERATURE: 97.9 F | HEIGHT: 62 IN

## 2019-01-25 DIAGNOSIS — J01.00 ACUTE NON-RECURRENT MAXILLARY SINUSITIS: Primary | ICD-10-CM

## 2019-01-25 PROCEDURE — 99213 OFFICE O/P EST LOW 20 MIN: CPT | Performed by: FAMILY MEDICINE

## 2019-01-25 RX ORDER — LEVOFLOXACIN 500 MG/1
500 TABLET, FILM COATED ORAL EVERY 24 HOURS
Qty: 7 TABLET | Refills: 0 | Status: SHIPPED | OUTPATIENT
Start: 2019-01-25 | End: 2019-02-01

## 2019-01-25 NOTE — PROGRESS NOTES
Assessment/Plan:     Diagnoses and all orders for this visit:    Acute non-recurrent maxillary sinusitis  -     levofloxacin (LEVAQUIN) 500 mg tablet; Take 1 tablet (500 mg total) by mouth every 24 hours for 7 days          Subjective:      Patient ID: Arjun Mcqueen is a 82 y o  female  The patient is here with jaw pain left for 3 weeks  Patient did see dentist   Patient with nasal congestion and sore throat and using salt water gargles  Patient also with nasal congestion and nasal discharge on the left  Patient also with maxillary sinus pain on the left  Patient exposed to sick contacts  The possible fever yesterday  Patient with some postnasal drip and cough also  No nausea vomiting or diarrhea  Patient did use Claritin  Sinusitis   Associated symptoms include congestion, coughing, ear pain, sinus pressure and a sore throat  Sore Throat    Associated symptoms include congestion, coughing and ear pain  Cough   Associated symptoms include ear pain, a fever, postnasal drip and a sore throat  The following portions of the patient's history were reviewed and updated as appropriate: allergies, current medications, past family history, past medical history, past social history, past surgical history and problem list     Review of Systems   Constitutional: Positive for fever  HENT: Positive for congestion, ear pain, postnasal drip, sinus pain, sinus pressure and sore throat  Eyes: Negative  Respiratory: Positive for cough  Cardiovascular: Negative  Gastrointestinal: Negative  Endocrine: Negative  Genitourinary: Negative  Musculoskeletal: Negative  Skin: Negative  Allergic/Immunologic: Negative  Neurological: Negative  Hematological: Negative  Psychiatric/Behavioral: Negative            Objective:      /64 (BP Location: Right arm, Patient Position: Sitting, Cuff Size: Large)   Pulse 89   Temp 97 9 °F (36 6 °C) (Tympanic)   Ht 5' 2" (1 575 m)   Wt 71 9 kg (158 lb 9 6 oz)   LMP  (LMP Unknown)   BMI 29 01 kg/m²          Physical Exam   Constitutional: She is oriented to person, place, and time  She appears well-developed and well-nourished  No distress  HENT:   Head: Normocephalic  Right Ear: External ear normal    Left Ear: External ear normal    Mouth/Throat: Oropharyngeal exudate present  Eyes: Pupils are equal, round, and reactive to light  EOM are normal  Right eye exhibits no discharge  Left eye exhibits no discharge  No scleral icterus  Neck: Normal range of motion  Neck supple  No thyromegaly present  Cardiovascular: Normal rate, regular rhythm, normal heart sounds and intact distal pulses  Exam reveals no gallop and no friction rub  No murmur heard  Pulmonary/Chest: Effort normal and breath sounds normal  No respiratory distress  She has no wheezes  She has no rales  She exhibits no tenderness  Musculoskeletal: Normal range of motion  She exhibits no edema or tenderness  Lymphadenopathy:     She has no cervical adenopathy  Neurological: She is oriented to person, place, and time  No cranial nerve deficit  She exhibits normal muscle tone  Coordination normal    Skin: Skin is warm and dry  No rash noted  She is not diaphoretic  No erythema  No pallor  Psychiatric: She has a normal mood and affect  Her behavior is normal  Judgment and thought content normal    Nursing note and vitals reviewed

## 2019-02-17 DIAGNOSIS — R23.2 HOT FLASHES: ICD-10-CM

## 2019-02-18 RX ORDER — CONJUGATED ESTROGENS 0.3 MG/1
TABLET, FILM COATED ORAL
Qty: 100 TABLET | Refills: 0 | Status: SHIPPED | OUTPATIENT
Start: 2019-02-18 | End: 2019-06-21 | Stop reason: SDUPTHER

## 2019-03-15 DIAGNOSIS — I10 ESSENTIAL HYPERTENSION: ICD-10-CM

## 2019-03-15 RX ORDER — METOPROLOL SUCCINATE 50 MG
TABLET, EXTENDED RELEASE 24 HR ORAL
Qty: 90 TABLET | Refills: 1 | Status: SHIPPED | OUTPATIENT
Start: 2019-03-15 | End: 2019-10-04 | Stop reason: SDUPTHER

## 2019-03-21 ENCOUNTER — APPOINTMENT (OUTPATIENT)
Dept: LAB | Facility: MEDICAL CENTER | Age: 84
End: 2019-03-21
Payer: MEDICARE

## 2019-03-21 DIAGNOSIS — R73.01 IMPAIRED FASTING GLUCOSE: ICD-10-CM

## 2019-03-21 LAB
ALBUMIN SERPL BCP-MCNC: 3.7 G/DL (ref 3.5–5)
ALP SERPL-CCNC: 60 U/L (ref 46–116)
ALT SERPL W P-5'-P-CCNC: 34 U/L (ref 12–78)
ANION GAP SERPL CALCULATED.3IONS-SCNC: 6 MMOL/L (ref 4–13)
AST SERPL W P-5'-P-CCNC: 29 U/L (ref 5–45)
BASOPHILS # BLD AUTO: 0.02 THOUSANDS/ΜL (ref 0–0.1)
BASOPHILS NFR BLD AUTO: 0 % (ref 0–1)
BILIRUB SERPL-MCNC: 0.56 MG/DL (ref 0.2–1)
BUN SERPL-MCNC: 27 MG/DL (ref 5–25)
CALCIUM SERPL-MCNC: 9.1 MG/DL (ref 8.3–10.1)
CHLORIDE SERPL-SCNC: 103 MMOL/L (ref 100–108)
CHOLEST SERPL-MCNC: 115 MG/DL (ref 50–200)
CO2 SERPL-SCNC: 26 MMOL/L (ref 21–32)
CREAT SERPL-MCNC: 1.03 MG/DL (ref 0.6–1.3)
CREAT UR-MCNC: 127 MG/DL
EOSINOPHIL # BLD AUTO: 0.18 THOUSAND/ΜL (ref 0–0.61)
EOSINOPHIL NFR BLD AUTO: 3 % (ref 0–6)
ERYTHROCYTE [DISTWIDTH] IN BLOOD BY AUTOMATED COUNT: 12.9 % (ref 11.6–15.1)
EST. AVERAGE GLUCOSE BLD GHB EST-MCNC: 140 MG/DL
GFR SERPL CREATININE-BSD FRML MDRD: 51 ML/MIN/1.73SQ M
GLUCOSE P FAST SERPL-MCNC: 135 MG/DL (ref 65–99)
HBA1C MFR BLD: 6.5 % (ref 4.2–6.3)
HCT VFR BLD AUTO: 37 % (ref 34.8–46.1)
HDLC SERPL-MCNC: 46 MG/DL (ref 40–60)
HGB BLD-MCNC: 12.2 G/DL (ref 11.5–15.4)
IMM GRANULOCYTES # BLD AUTO: 0.01 THOUSAND/UL (ref 0–0.2)
IMM GRANULOCYTES NFR BLD AUTO: 0 % (ref 0–2)
LDLC SERPL CALC-MCNC: 38 MG/DL (ref 0–100)
LYMPHOCYTES # BLD AUTO: 1.51 THOUSANDS/ΜL (ref 0.6–4.47)
LYMPHOCYTES NFR BLD AUTO: 24 % (ref 14–44)
MCH RBC QN AUTO: 30.7 PG (ref 26.8–34.3)
MCHC RBC AUTO-ENTMCNC: 33 G/DL (ref 31.4–37.4)
MCV RBC AUTO: 93 FL (ref 82–98)
MICROALBUMIN UR-MCNC: 9.7 MG/L (ref 0–20)
MICROALBUMIN/CREAT 24H UR: 8 MG/G CREATININE (ref 0–30)
MONOCYTES # BLD AUTO: 0.79 THOUSAND/ΜL (ref 0.17–1.22)
MONOCYTES NFR BLD AUTO: 13 % (ref 4–12)
NEUTROPHILS # BLD AUTO: 3.69 THOUSANDS/ΜL (ref 1.85–7.62)
NEUTS SEG NFR BLD AUTO: 60 % (ref 43–75)
NONHDLC SERPL-MCNC: 69 MG/DL
NRBC BLD AUTO-RTO: 0 /100 WBCS
PLATELET # BLD AUTO: 197 THOUSANDS/UL (ref 149–390)
PMV BLD AUTO: 13.2 FL (ref 8.9–12.7)
POTASSIUM SERPL-SCNC: 4.3 MMOL/L (ref 3.5–5.3)
PROT SERPL-MCNC: 6.7 G/DL (ref 6.4–8.2)
RBC # BLD AUTO: 3.97 MILLION/UL (ref 3.81–5.12)
SODIUM SERPL-SCNC: 135 MMOL/L (ref 136–145)
TRIGL SERPL-MCNC: 156 MG/DL
TSH SERPL DL<=0.05 MIU/L-ACNC: 3.21 UIU/ML (ref 0.36–3.74)
WBC # BLD AUTO: 6.2 THOUSAND/UL (ref 4.31–10.16)

## 2019-03-21 PROCEDURE — 80061 LIPID PANEL: CPT

## 2019-03-21 PROCEDURE — 82570 ASSAY OF URINE CREATININE: CPT | Performed by: FAMILY MEDICINE

## 2019-03-21 PROCEDURE — 80053 COMPREHEN METABOLIC PANEL: CPT

## 2019-03-21 PROCEDURE — 83036 HEMOGLOBIN GLYCOSYLATED A1C: CPT

## 2019-03-21 PROCEDURE — 36415 COLL VENOUS BLD VENIPUNCTURE: CPT

## 2019-03-21 PROCEDURE — 84443 ASSAY THYROID STIM HORMONE: CPT

## 2019-03-21 PROCEDURE — 85025 COMPLETE CBC W/AUTO DIFF WBC: CPT

## 2019-03-21 PROCEDURE — 82043 UR ALBUMIN QUANTITATIVE: CPT | Performed by: FAMILY MEDICINE

## 2019-03-22 DIAGNOSIS — I10 ESSENTIAL HYPERTENSION: ICD-10-CM

## 2019-03-22 RX ORDER — AMLODIPINE BESYLATE/BENAZEPRIL 5 MG-20 MG
CAPSULE ORAL
Qty: 90 CAPSULE | Refills: 1 | Status: SHIPPED | OUTPATIENT
Start: 2019-03-22 | End: 2019-10-04 | Stop reason: SDUPTHER

## 2019-03-26 ENCOUNTER — OFFICE VISIT (OUTPATIENT)
Dept: FAMILY MEDICINE CLINIC | Facility: CLINIC | Age: 84
End: 2019-03-26
Payer: MEDICARE

## 2019-03-26 VITALS
BODY MASS INDEX: 28.52 KG/M2 | WEIGHT: 155 LBS | DIASTOLIC BLOOD PRESSURE: 60 MMHG | TEMPERATURE: 98.3 F | HEIGHT: 62 IN | SYSTOLIC BLOOD PRESSURE: 118 MMHG

## 2019-03-26 DIAGNOSIS — Z00.00 MEDICARE ANNUAL WELLNESS VISIT, SUBSEQUENT: ICD-10-CM

## 2019-03-26 DIAGNOSIS — E66.3 OVERWEIGHT (BMI 25.0-29.9): ICD-10-CM

## 2019-03-26 DIAGNOSIS — I10 ESSENTIAL HYPERTENSION: ICD-10-CM

## 2019-03-26 DIAGNOSIS — E06.3 HYPOTHYROIDISM DUE TO HASHIMOTO'S THYROIDITIS: ICD-10-CM

## 2019-03-26 DIAGNOSIS — E03.8 HYPOTHYROIDISM DUE TO HASHIMOTO'S THYROIDITIS: ICD-10-CM

## 2019-03-26 DIAGNOSIS — E78.2 MIXED HYPERLIPIDEMIA: ICD-10-CM

## 2019-03-26 DIAGNOSIS — E11.9 TYPE 2 DIABETES MELLITUS WITHOUT COMPLICATION, WITHOUT LONG-TERM CURRENT USE OF INSULIN (HCC): Primary | ICD-10-CM

## 2019-03-26 DIAGNOSIS — Z23 ENCOUNTER FOR VACCINATION: ICD-10-CM

## 2019-03-26 PROCEDURE — G0439 PPPS, SUBSEQ VISIT: HCPCS | Performed by: FAMILY MEDICINE

## 2019-03-26 PROCEDURE — 99214 OFFICE O/P EST MOD 30 MIN: CPT | Performed by: FAMILY MEDICINE

## 2019-03-26 NOTE — PROGRESS NOTES
Assessment/Plan:  Pamphlet and information given regarding diabetes  Patient will modify diet  Patient will have A1c rechecked in the office  Patient will check feet routinely  Patient will see ophthalmologist next week  The patient will see dietician  Patient will continue with medications as listed  Refills will be given as needed  Diagnoses and all orders for this visit:    Type 2 diabetes mellitus without complication, without long-term current use of insulin ([facility] [address] )  -     Ambulatory referral to Nutrition Services; Future    Encounter for vaccination  -     PNEUMOCOCCAL CONJUGATE VACCINE 13-VALENT GREATER THAN 6 MONTHS    Essential hypertension    Hypothyroidism due to Hashimoto's thyroiditis    Mixed hyperlipidemia    Medicare annual wellness visit, subsequent    Overweight (BMI 25 0-29  9)          Subjective:      Patient ID: Fred Dean is a 82 y o  female  Patient follow-up on diabetes hypertension hyperlipidemia hypothyroidism  Patient had laboratory studies done which were reviewed with the patient  Patient's A1c 6 5  LDL controlled  Patient with chronic knee pain but otherwise feels well      The following portions of the patient's history were reviewed and updated as appropriate: allergies, current medications, past family history, past medical history, past social history, past surgical history and problem list     Review of Systems   Constitutional: Negative  HENT: Negative  Eyes: Negative  Respiratory: Negative  Cardiovascular: Negative  Gastrointestinal: Negative  Endocrine: Negative  Genitourinary: Negative  Musculoskeletal: Positive for arthralgias  Skin: Negative  Allergic/Immunologic: Negative  Neurological: Negative  Hematological: Negative  Psychiatric/Behavioral: Negative            Objective:      /60 (BP Location: Right arm, Patient Position: Sitting, Cuff Size: Adult)   Temp 98 3 °F (36 8 °C) (Tympanic)   Ht 5' 1 5" (1 562 m) Wt 70 3 kg (155 lb)   LMP  (LMP Unknown)   BMI 28 81 kg/m²          Physical Exam   Constitutional: She is oriented to person, place, and time  She appears well-developed and well-nourished  No distress  HENT:   Head: Normocephalic  Right Ear: External ear normal    Left Ear: External ear normal    Mouth/Throat: Oropharynx is clear and moist  No oropharyngeal exudate  Eyes: Pupils are equal, round, and reactive to light  EOM are normal  Right eye exhibits no discharge  Left eye exhibits no discharge  No scleral icterus  Neck: Normal range of motion  Neck supple  No thyromegaly present  Cardiovascular: Normal rate, regular rhythm, normal heart sounds and intact distal pulses  Exam reveals no gallop and no friction rub  No murmur heard  Pulmonary/Chest: Effort normal and breath sounds normal  No respiratory distress  She has no wheezes  She has no rales  She exhibits no tenderness  Abdominal: Soft  Bowel sounds are normal  She exhibits no distension  There is no tenderness  There is no rebound and no guarding  Musculoskeletal: Normal range of motion  She exhibits no edema or tenderness  Lymphadenopathy:     She has no cervical adenopathy  Neurological: She is oriented to person, place, and time  No cranial nerve deficit  She exhibits normal muscle tone  Coordination normal    Skin: Skin is warm and dry  No rash noted  She is not diaphoretic  No erythema  No pallor  Psychiatric: She has a normal mood and affect  Her behavior is normal  Judgment and thought content normal    Nursing note and vitals reviewed  BMI Counseling: Body mass index is 28 81 kg/m²  Discussed the patient's BMI with her  The BMI is above average  BMI counseling and education was provided to the patient  Nutrition recommendations include reducing portion sizes

## 2019-03-26 NOTE — PATIENT INSTRUCTIONS

## 2019-03-26 NOTE — PROGRESS NOTES
Assessment and Plan:  Problem List Items Addressed This Visit     None      Visit Diagnoses     Encounter for vaccination    -  Primary    Relevant Orders    PNEUMOCOCCAL CONJUGATE VACCINE 13-VALENT GREATER THAN 6 MONTHS        Health Maintenance Due   Topic Date Due    Medicare Annual Wellness Visit (AWV)  1935    BMI: Followup Plan  02/02/1955    Pneumococcal PPSV23/PCV13 65+ Years / Low and Medium Risk (2 of 2 - PCV13) 12/13/2017         HPI:  Patient Active Problem List   Diagnosis    Bilateral leg edema    Hyperlipidemia    Hypertension    Impaired fasting glucose    Osteopenia    Vitamin B12 deficiency    Vitamin D deficiency    Hypothyroidism due to Hashimoto's thyroiditis    Acute non-recurrent maxillary sinusitis     Past Medical History:   Diagnosis Date    Administration of long-term prophylactic antibiotics 04/29/2014    Need for Prophylactic Antibiotics     Past Surgical History:   Procedure Laterality Date    TOTAL ABDOMINAL HYSTERECTOMY W/ BILATERAL SALPINGOOPHORECTOMY      with Entercele Repair     Family History   Problem Relation Age of Onset    No Known Problems Mother      Social History     Tobacco Use   Smoking Status Former Smoker   Smokeless Tobacco Never Used     Social History     Substance and Sexual Activity   Alcohol Use Yes    Comment: Social drinker      Social History     Substance and Sexual Activity   Drug Use No         Current Outpatient Medications   Medication Sig Dispense Refill    aspirin 81 MG tablet Take 1 tablet by mouth daily      BENICAR HCT 40-12 5 MG per tablet TAKE 1 TABLET BY MOUTH DAILY   90 tablet 0    bimatoprost (LUMIGAN) 0 01 % ophthalmic drops Apply 1 drop to eye Daily      Brinzolamide-Brimonidine (SIMBRINZA) 1-0 2 % SUSP Apply to eye      CELEBREX 200 MG capsule TAKE ONE CAPSULE BY MOUTH DAILY WITH A MEAL 90 capsule 1    Coenzyme Q10 (COQ10) 100 MG CAPS Take 1 capsule by mouth daily      Cranberry 450 MG TABS Take by mouth      Loratadine (CLARITIN) 10 MG CAPS Take by mouth      LOTREL 5-20 MG per capsule TAKE 1 CAPSULE BY MOUTH EVERY DAY 90 capsule 1    Multiple Vitamins-Minerals (CENTRUM SILVER ULTRA WOMENS PO) Take 1 tablet by mouth daily      Olopatadine HCl (PAZEO) 0 7 % SOLN Apply to eye      Omega-3 Fatty Acids (FISH OIL) 1,000 mg Take 1 capsule by mouth daily      polyethylene glycol-propylene glycol (SYSTANE ULTRA) 0 4-0 3 % Apply to eye      PREMARIN 0 3 MG tablet TAKE 1 TABLET BY MOUTH ONCE DAILY 5 DAYS PER WEEK 100 tablet 0    SYNTHROID 25 MCG tablet TAKE 1 TABLET BY MOUTH EVERY DAY 90 tablet 1    TOPROL XL 50 MG 24 hr tablet TAKE 1 TABLET BY MOUTH EVERY DAY 90 tablet 1    VYTORIN 10-20 MG per tablet TAKE 1 TABLET BY MOUTH EVERY DAY 90 tablet 1     No current facility-administered medications for this visit  Allergies   Allergen Reactions    Clindamycin      Reaction Date: [id number];     Other     Penicillins     Sulfa Antibiotics      Immunization History   Administered Date(s) Administered    INFLUENZA 09/23/2018    Influenza Split High Dose Preservative Free IM 10/11/2013, 09/29/2015, 09/13/2016, 10/12/2017    Pneumococcal Conjugate PCV 7 07/02/2013    Pneumococcal Polysaccharide PPV23 12/11/2014, 12/13/2016    Td (adult), adsorbed 01/25/2013    Zoster Vaccine Recombinant 06/06/2018, 11/16/2018       Patient Care Team:  Kusum Ford DO as PCP - General  Julia Childs DO    Medicare Screening Tests and Risk Assessments:  Tj Lopez is here for her Subsequent Wellness visit  Health Risk Assessment:  Patient rates overall health as good  Patient feels that their physical health rating is Same  Eyesight was rated as Same  Hearing was rated as Same  Patient feels that their emotional and mental health rating is Same  Pain experienced by patient in the last 7 days has been Some  Patient's pain rating has been 4/10  Patient states that she has experienced no weight loss or gain in last 6 months  Emotional/Mental Health:  Patient has been feeling nervous/anxious  PHQ-9 Depression Screening:    Frequency of the following problems over the past two weeks:      1  Little interest or pleasure in doing things: 0 - not at all      2  Feeling down, depressed, or hopeless: 0 - not at all  PHQ-2 Score: 0          Broken Bones/Falls: Fall Risk Assessment:    In the past year, patient has experienced: No history of falling in past year          Bladder/Bowel:  Patient has leaked urine accidently in the last six months  Patient reports no loss of bowel control  Immunizations:  Patient has had a flu vaccination within the last year  Patient has received a pneumonia shot  Patient has received a shingles shot  Patient has not received tetanus/diphtheria shot  Home Safety:  Patient does not have trouble with stairs inside or outside of their home  Patient currently reports that there are no safety hazards present in home, working smoke alarms, working carbon monoxide detectors  Preventative Screenings:   Breast cancer screening performed, colon cancer screen completed, cholesterol screen completed, glaucoma eye exam completed,     Nutrition:  Current diet: Regular with servings of the following:    Medications:  Patient is currently taking over-the-counter supplements  List of OTC medications includes: Cranberry, Co-Q10, Krill Oil, Vit D  Patient is able to manage medications  Lifestyle Choices:  Patient reports no tobacco use  Patient has smoked or used tobacco in the past   Patient has stopped her tobacco use  Patient reports alcohol use  Patient drives a vehicle  Patient wears seat belt          Activities of Daily Living:  Can get out of bed by his or her self, able to dress self, able to make own meals, able to do own shopping, able to bathe self, can do own laundry/housekeeping, can manage own money, pay bills and track expenses    Previous Hospitalizations:  No hospitalization or ED visit in past 12 months        Advanced Directives:  Patient has decided on a power of   Patient has spoken to designated power of   Patient has completed advanced directive  Preventative Screening/Counseling:      Cardiovascular:      General: Risks and Benefits Discussed and Screening Current          Diabetes:      General: Risks and Benefits Discussed and Screening Current          Colorectal Cancer:      General: Risks and Benefits Discussed and Patient Declines          Breast Cancer:      General: Risks and Benefits Discussed and Screening Current          Cervical Cancer:      General: Risks and Benefits Discussed and Patient Declines          Osteoporosis:      General: Risks and Benefits Discussed and Screening Current      Counseling: Calcium and Vitamin D Intake          AAA:      General: Screening Not Indicated          Glaucoma:      General: Risks and Benefits Discussed and Screening Current          HIV:      General: Risks and Benefits Discussed and Screening Not Indicated          Hepatitis C:      General: Risks and Benefits Discussed and Screening Not Indicated        Advanced Directives:   Patient has living will for healthcare, has durable POA for healthcare, patient has an advanced directive  Immunizations:      Influenza: Risks & Benefits Discussed and Influenza UTD This Year      Pneumococcal: Risks & Benefits Discussed and Lifetime Vaccine Completed      Shingrix: Risks & Benefits Discussed and Shingrix Vaccine UTD      TD: Risks & Benefits Discussed and Td Vaccine UTD      Other Preventative Counseling (Non-Medicare): Fall Prevention and Increase physical activity          No exam data present    Physical Exam:  Review of Systems   Gastrointestinal: Negative for bowel incontinence  Psychiatric/Behavioral: The patient is not nervous/anxious          Vitals:    03/26/19 1507   BP: 118/60   BP Location: Right arm   Patient Position: Sitting   Cuff Size: Adult   Temp: 98 3 °F (36 8 °C)   TempSrc: Tympanic   Weight: 70 3 kg (155 lb)   Height: 5' 1 5" (1 562 m)   Body mass index is 28 81 kg/m²      Physical Exam

## 2019-04-02 LAB
LEFT EYE DIABETIC RETINOPATHY: NORMAL
RIGHT EYE DIABETIC RETINOPATHY: NORMAL

## 2019-04-10 DIAGNOSIS — E13.8 DIABETES MELLITUS OF OTHER TYPE WITH COMPLICATION, UNSPECIFIED WHETHER LONG TERM INSULIN USE: Primary | ICD-10-CM

## 2019-04-15 DIAGNOSIS — I10 ESSENTIAL HYPERTENSION: ICD-10-CM

## 2019-04-16 RX ORDER — OLMESARTAN/HYDROCHLOROTHIAZIDE 40-12.5 MG
TABLET ORAL
Qty: 90 TABLET | Refills: 0 | Status: SHIPPED | OUTPATIENT
Start: 2019-04-16 | End: 2019-06-10 | Stop reason: SDUPTHER

## 2019-05-02 DIAGNOSIS — R79.89 ABNORMAL TSH: ICD-10-CM

## 2019-05-03 RX ORDER — LEVOTHYROXINE SODIUM 25 MCG
TABLET ORAL
Qty: 90 TABLET | Refills: 1 | Status: SHIPPED | OUTPATIENT
Start: 2019-05-03 | End: 2020-03-16

## 2019-05-10 DIAGNOSIS — E11.9 TYPE 2 DIABETES MELLITUS WITHOUT COMPLICATION, WITHOUT LONG-TERM CURRENT USE OF INSULIN (HCC): ICD-10-CM

## 2019-05-10 DIAGNOSIS — I10 ESSENTIAL HYPERTENSION: Primary | ICD-10-CM

## 2019-05-10 RX ORDER — CEPHALEXIN 500 MG/1
CAPSULE ORAL
Qty: 10 CAPSULE | Refills: 2 | Status: SHIPPED | OUTPATIENT
Start: 2019-05-10 | End: 2021-03-08

## 2019-06-05 ENCOUNTER — CLINICAL SUPPORT (OUTPATIENT)
Dept: NUTRITION | Facility: HOSPITAL | Age: 84
End: 2019-06-05
Payer: MEDICARE

## 2019-06-05 VITALS — WEIGHT: 152.2 LBS | BODY MASS INDEX: 28.29 KG/M2

## 2019-06-05 DIAGNOSIS — E11.9 TYPE 2 DIABETES MELLITUS WITHOUT COMPLICATION, WITHOUT LONG-TERM CURRENT USE OF INSULIN (HCC): ICD-10-CM

## 2019-06-05 PROCEDURE — 97802 MEDICAL NUTRITION INDIV IN: CPT

## 2019-06-10 DIAGNOSIS — I10 ESSENTIAL HYPERTENSION: ICD-10-CM

## 2019-06-10 DIAGNOSIS — M19.90 ARTHRITIS: ICD-10-CM

## 2019-06-10 DIAGNOSIS — E78.2 MIXED HYPERLIPIDEMIA: ICD-10-CM

## 2019-06-10 RX ORDER — EZETIMIBE/SIMVASTATIN 10 MG-20MG
TABLET ORAL
Qty: 90 TABLET | Refills: 1 | Status: SHIPPED | OUTPATIENT
Start: 2019-06-10 | End: 2019-12-19 | Stop reason: SDUPTHER

## 2019-06-10 RX ORDER — OLMESARTAN/HYDROCHLOROTHIAZIDE 40-12.5 MG
TABLET ORAL
Qty: 90 TABLET | Refills: 0 | Status: SHIPPED | OUTPATIENT
Start: 2019-06-10 | End: 2019-09-29 | Stop reason: SDUPTHER

## 2019-06-21 DIAGNOSIS — R23.2 HOT FLASHES: ICD-10-CM

## 2019-07-23 ENCOUNTER — CLINICAL SUPPORT (OUTPATIENT)
Dept: NUTRITION | Facility: HOSPITAL | Age: 84
End: 2019-07-23
Payer: MEDICARE

## 2019-07-23 ENCOUNTER — OFFICE VISIT (OUTPATIENT)
Dept: FAMILY MEDICINE CLINIC | Facility: CLINIC | Age: 84
End: 2019-07-23
Payer: MEDICARE

## 2019-07-23 VITALS
WEIGHT: 152 LBS | SYSTOLIC BLOOD PRESSURE: 142 MMHG | DIASTOLIC BLOOD PRESSURE: 68 MMHG | BODY MASS INDEX: 27.97 KG/M2 | HEART RATE: 78 BPM | HEIGHT: 62 IN

## 2019-07-23 VITALS — WEIGHT: 152.8 LBS | BODY MASS INDEX: 28.4 KG/M2

## 2019-07-23 DIAGNOSIS — E11.9 DIABETES MELLITUS WITHOUT COMPLICATION (HCC): Primary | ICD-10-CM

## 2019-07-23 DIAGNOSIS — E11.9 TYPE 2 DIABETES MELLITUS WITHOUT COMPLICATION, WITHOUT LONG-TERM CURRENT USE OF INSULIN (HCC): Primary | ICD-10-CM

## 2019-07-23 DIAGNOSIS — M17.0 PRIMARY OSTEOARTHRITIS OF BOTH KNEES: ICD-10-CM

## 2019-07-23 DIAGNOSIS — E03.8 HYPOTHYROIDISM DUE TO HASHIMOTO'S THYROIDITIS: ICD-10-CM

## 2019-07-23 DIAGNOSIS — I10 ESSENTIAL HYPERTENSION: ICD-10-CM

## 2019-07-23 DIAGNOSIS — E06.3 HYPOTHYROIDISM DUE TO HASHIMOTO'S THYROIDITIS: ICD-10-CM

## 2019-07-23 DIAGNOSIS — E78.2 MIXED HYPERLIPIDEMIA: ICD-10-CM

## 2019-07-23 PROCEDURE — 99214 OFFICE O/P EST MOD 30 MIN: CPT | Performed by: FAMILY MEDICINE

## 2019-07-23 PROCEDURE — 97803 MED NUTRITION INDIV SUBSEQ: CPT

## 2019-07-23 NOTE — PROGRESS NOTES
Assessment/Plan:  Patient will continue with diet modification for diabetes  Patient will continue to follow up with dietician  Patient will continue with other medications for other chronic conditions listed  To consider seeing Orthopedics for chronic knee pain  Diagnoses and all orders for this visit:    Type 2 diabetes mellitus without complication, without long-term current use of insulin (HCC)    Hypothyroidism due to Hashimoto's thyroiditis    Essential hypertension    Mixed hyperlipidemia    Primary osteoarthritis of both knees            Subjective:        Patient ID: Karen Goodrich is a 82 y o  female  Patient follow-up on hypothyroidism hypertension hyperlipidemia as well as diabetes mellitus type 2 which is diet controlled  Patient did see dietician  Patient having difficulty with steps with her knees  No chest pain shortness of breath or change in urination or defecation or headaches  The following portions of the patient's history were reviewed and updated as appropriate: allergies, current medications, past family history, past medical history, past social history, past surgical history and problem list       Review of Systems   Constitutional: Negative  HENT: Negative  Eyes: Negative  Respiratory: Negative  Cardiovascular: Negative  Gastrointestinal: Negative  Endocrine: Negative  Genitourinary: Negative  Musculoskeletal: Positive for arthralgias  Skin: Negative  Allergic/Immunologic: Negative  Neurological: Negative  Hematological: Negative  Psychiatric/Behavioral: Negative  Objective:      BMI Counseling: Body mass index is 28 26 kg/m²  Discussed the patient's BMI with her  The BMI is above average  BMI counseling and education was provided to the patient  Nutrition recommendations include reducing portion sizes          /68 (BP Location: Right arm)   Pulse 78   Ht 5' 1 5" (1 562 m)   Wt 68 9 kg (152 lb)   LMP  (LMP Unknown)   BMI 28 26 kg/m²          Physical Exam   Constitutional: She is oriented to person, place, and time  She appears well-developed and well-nourished  No distress  HENT:   Head: Normocephalic  Right Ear: External ear normal    Left Ear: External ear normal    Mouth/Throat: Oropharynx is clear and moist  No oropharyngeal exudate  Eyes: Pupils are equal, round, and reactive to light  EOM are normal  Right eye exhibits no discharge  Left eye exhibits no discharge  No scleral icterus  Neck: Normal range of motion  Neck supple  No thyromegaly present  Cardiovascular: Normal rate, regular rhythm, normal heart sounds and intact distal pulses  Exam reveals no gallop and no friction rub  No murmur heard  Pulmonary/Chest: Effort normal and breath sounds normal  No respiratory distress  She has no wheezes  She has no rales  She exhibits no tenderness  Abdominal: Soft  Bowel sounds are normal  She exhibits no distension  There is no tenderness  There is no rebound and no guarding  Musculoskeletal: Normal range of motion  She exhibits tenderness and deformity  She exhibits no edema  Lymphadenopathy:     She has no cervical adenopathy  Neurological: She is oriented to person, place, and time  No cranial nerve deficit  She exhibits normal muscle tone  Coordination normal    Skin: Skin is warm and dry  No rash noted  She is not diaphoretic  No erythema  No pallor  Psychiatric: She has a normal mood and affect  Her behavior is normal  Judgment and thought content normal    Nursing note and vitals reviewed

## 2019-07-23 NOTE — PROGRESS NOTES
Follow-Up Nutrition Assessment Form    Patient Name: Rosi Coulter    YOB: 1935    Sex: Female      Follow Up Date: 7/23/2019  Start Time: 145 Stop Time: 215 Total Minutes: 27     Data:  Present at session: self   Parent Concerns: n/a   Medical Dx/Reason for Referral: DM   Past Medical History:   Diagnosis Date    Administration of long-term prophylactic antibiotics 04/29/2014    Need for Prophylactic Antibiotics       Current Outpatient Medications   Medication Sig Dispense Refill    aspirin 81 MG tablet Take 1 tablet by mouth daily      BENICAR HCT 40-12 5 MG per tablet TAKE 1 TABLET BY MOUTH EVERY DAY 90 tablet 0    bimatoprost (LUMIGAN) 0 01 % ophthalmic drops Apply 1 drop to eye Daily      Brinzolamide-Brimonidine (SIMBRINZA) 1-0 2 % SUSP Apply to eye      CELEBREX 200 MG capsule TAKE ONE CAPSULE BY MOUTH DAILY WITH A MEAL 90 capsule 1    Coenzyme Q10 (COQ10) 100 MG CAPS Take 1 capsule by mouth daily      conjugated estrogens (PREMARIN) 0 3 mg tablet Take 1 tablet (0 3 mg total) by mouth daily Take daily for 21 days then do not take for 7 days  64 tablet 0    Cranberry 450 MG TABS Take by mouth      Loratadine (CLARITIN) 10 MG CAPS Take by mouth      LOTREL 5-20 MG per capsule TAKE 1 CAPSULE BY MOUTH EVERY DAY 90 capsule 1    Multiple Vitamins-Minerals (CENTRUM SILVER ULTRA WOMENS PO) Take 1 tablet by mouth daily      Olopatadine HCl (PAZEO) 0 7 % SOLN Apply to eye      Omega-3 Fatty Acids (FISH OIL) 1,000 mg Take 1 capsule by mouth daily      polyethylene glycol-propylene glycol (SYSTANE ULTRA) 0 4-0 3 % Apply to eye      SYNTHROID 25 MCG tablet TAKE 1 TABLET BY MOUTH EVERY DAY 90 tablet 1    TOPROL XL 50 MG 24 hr tablet TAKE 1 TABLET BY MOUTH EVERY DAY 90 tablet 1    VYTORIN 10-20 MG per tablet TAKE 1 TABLET BY MOUTH EVERY DAY 90 tablet 1     No current facility-administered medications for this visit           Additional Meds/Supplements: n/a   Barriers to Learning: None Labs: n/a   Height:   Ht Readings from Last 3 Encounters:   07/23/19 5' 1 5" (1 562 m)   03/26/19 5' 1 5" (1 562 m)   01/25/19 5' 2" (1 575 m)      Weight: Wt Readings from Last 12 Encounters:   07/23/19 69 3 kg (152 lb 12 8 oz)   07/23/19 68 9 kg (152 lb)   06/05/19 69 kg (152 lb 3 2 oz)   03/26/19 70 3 kg (155 lb)   01/25/19 71 9 kg (158 lb 9 6 oz)   11/26/18 70 8 kg (156 lb)   07/03/18 72 6 kg (160 lb)   03/20/18 72 8 kg (160 lb 6 4 oz)   01/19/18 71 7 kg (158 lb)   01/15/18 72 6 kg (160 lb)   11/30/17 72 1 kg (159 lb)   08/02/17 73 5 kg (162 lb 2 1 oz)     Estimated body mass index is 28 4 kg/m² as calculated from the following:    Height as of an earlier encounter on 7/23/19: 5' 1 5" (1 562 m)  Weight as of this encounter: 69 3 kg (152 lb 12 8 oz)  Wt  Change Since Last Visit: ?Yes     ? xxNo  Amount:       Energy Needs: No calculation needed   Pain Screen: Are you having pain now? No      Goals Achieved:feels she is eating too much fruit  Will eat 3/4c watermelon w/black pepper and feta  Feels she eats a lot of grapes as well  Does not drink any juices, drinks mainly water  Plus drinks 2 mugs of reg coffee in AM, decaf or reg mug, no issues with sleeping  Eats 3 meals a day and tries to eat every 2 5 hours; carries string cheese or skinny bars  Is using chair lift in her house  Uses it mainly for her knee pain  Does not look stated age  Has always been a very good eater  Has always been able to eat large quantities  Usually orders fish and baked potato when eating out  Also will choose smaller portions of meals when eating out  Just watches portion sizes of everything  New Goals:   1 continue same meal and activity plan   2    3        Initial PES:       New PES: No Change      New Problem List:  1 none new   2    3        Assessment:  Does not check BG at home, is pleased with her wt and lab work  Very active, eats quite regularly  sleeping well, and good energy throughout the day    New lab work due soon  Medical Nutrition Therapy Intervention:  ?xIndividualized Meal Plan ? Understanding Lab Values   ? Basic Pathophysiology of Disease ? Food/Medication Interactions   ? xFood Diary ?xx  Exercise   ? xLifestyle/Behavior Modification Techniques ? Medication, Mechanism of Action   ? Label Reading ? Self Blood Glucose Monitoring   ? xWeight/BMI Goals ? Other - hubby with recent pacemaker   Other Notes:        Comprehension: ?Excellent  ? xVery Good  ? Good  ? Fair   ? Poor    Receptivity: ?Excellent  ? xVery Good  ? Good  ? Fair   ? Poor    Expected Compliance: ?Excellent  ? xVery Good  ? Good  ? Fair   ? Poor      Labs:  CMP  Lab Results   Component Value Date     06/04/2015    K 4 3 03/21/2019     03/21/2019    CO2 26 03/21/2019    ANIONGAP 9 06/04/2015    BUN 27 (H) 03/21/2019    CREATININE 1 03 03/21/2019    GLUCOSE 131 06/04/2015    GLUF 135 (H) 03/21/2019    CALCIUM 9 1 03/21/2019    AST 29 03/21/2019    ALT 34 03/21/2019    ALKPHOS 60 03/21/2019    PROT 7 3 06/04/2015    BILITOT 0 48 06/04/2015    EGFR 51 03/21/2019       BMP  Lab Results   Component Value Date    GLUCOSE 131 06/04/2015    CALCIUM 9 1 03/21/2019     06/04/2015    K 4 3 03/21/2019    CO2 26 03/21/2019     03/21/2019    BUN 27 (H) 03/21/2019    CREATININE 1 03 03/21/2019       Lipids  Lab Results   Component Value Date    CHOL 142 12/11/2014     Lab Results   Component Value Date    HDL 46 03/21/2019    HDL 45 03/13/2018    HDL 44 09/19/2016     Lab Results   Component Value Date    LDLCALC 38 03/21/2019    LDLCALC 38 03/13/2018    LDLCALC 45 09/19/2016     Lab Results   Component Value Date    TRIG 156 (H) 03/21/2019    TRIG 167 (H) 03/13/2018    TRIG 150 09/19/2016     No results found for: CHOLHDL    Hemoglobin A1C  Lab Results   Component Value Date    HGBA1C 6 5 (H) 03/21/2019       Fasting Glucose  Lab Results   Component Value Date    GLUF 135 (H) 03/21/2019       Insulin     Thyroid  No results found for: TSH, Jasmynmatthew Olvera    Hepatic Function Panel  Lab Results   Component Value Date    ALT 34 03/21/2019    AST 29 03/21/2019    ALKPHOS 60 03/21/2019    BILITOT 0 48 06/04/2015       Celiac Disease Antibody Panel  No results found for: ENDOMYSIAL IGA, GLIADIN IGA, GLIADIN IGG, IGA, TISSUE TRANSGLUT AB, TTG IGA   Iron  No results found for: IRON, TIBC, FERRITIN    Vitamins  No results found for: VITAMIN B2   No results found for: NICOTINAMIDE, NICOTINIC ACID   No results found for: VITAMINB6  Lab Results   Component Value Date    HRMYZRAG84 161 03/13/2018     No results found for: VITB5  No results found for: E0AZSDDK  No results found for: THYROGLB  No results found for: VITAMIN K   No results found for: 25-HYDROXY VIT D   No components found for: VITAMINE     No follow-ups on file      [id number] [address] [place] [address] [address]  [address]  [facility] [id number]

## 2019-07-24 DIAGNOSIS — R23.2 HOT FLASHES: ICD-10-CM

## 2019-07-24 RX ORDER — CONJUGATED ESTROGENS 0.3 MG/1
TABLET, FILM COATED ORAL
Qty: 34 TABLET | Refills: 2 | Status: SHIPPED | OUTPATIENT
Start: 2019-07-24 | End: 2021-06-16 | Stop reason: SDUPTHER

## 2019-08-12 DIAGNOSIS — R79.89 ABNORMAL TSH: ICD-10-CM

## 2019-08-12 DIAGNOSIS — R23.2 HOT FLASHES: ICD-10-CM

## 2019-08-12 RX ORDER — CONJUGATED ESTROGENS 0.3 MG/1
TABLET, FILM COATED ORAL
Qty: 34 TABLET | Refills: 2 | Status: SHIPPED | OUTPATIENT
Start: 2019-08-12 | End: 2019-12-04

## 2019-08-12 RX ORDER — LEVOTHYROXINE SODIUM 25 MCG
TABLET ORAL
Qty: 90 TABLET | Refills: 1 | Status: SHIPPED | OUTPATIENT
Start: 2019-08-12 | End: 2019-12-04

## 2019-09-12 ENCOUNTER — CLINICAL SUPPORT (OUTPATIENT)
Dept: NUTRITION | Facility: HOSPITAL | Age: 84
End: 2019-09-12
Payer: MEDICARE

## 2019-09-12 VITALS — WEIGHT: 153.4 LBS | BODY MASS INDEX: 28.52 KG/M2

## 2019-09-12 DIAGNOSIS — E11.9 DIABETES MELLITUS WITHOUT COMPLICATION (HCC): Primary | ICD-10-CM

## 2019-09-12 PROCEDURE — 97803 MED NUTRITION INDIV SUBSEQ: CPT

## 2019-09-12 NOTE — PROGRESS NOTES
Follow-Up Nutrition Assessment Form    Patient Name: Isabella Martinez    YOB: 1935    Sex: Female      Follow Up Date: 9/12/2019  Start Time: 1025 Stop Time: 1055 Total Minutes: 27     Data:  Present at session: self   Parent Concerns: n/a   Medical Dx/Reason for Referral: DM   Past Medical History:   Diagnosis Date    Administration of long-term prophylactic antibiotics 04/29/2014    Need for Prophylactic Antibiotics       Current Outpatient Medications   Medication Sig Dispense Refill    aspirin 81 MG tablet Take 1 tablet by mouth daily      BENICAR HCT 40-12 5 MG per tablet TAKE 1 TABLET BY MOUTH EVERY DAY 90 tablet 0    bimatoprost (LUMIGAN) 0 01 % ophthalmic drops Apply 1 drop to eye Daily      Brinzolamide-Brimonidine (SIMBRINZA) 1-0 2 % SUSP Apply to eye      CELEBREX 200 MG capsule TAKE ONE CAPSULE BY MOUTH DAILY WITH A MEAL 90 capsule 1    Coenzyme Q10 (COQ10) 100 MG CAPS Take 1 capsule by mouth daily      conjugated estrogens (PREMARIN) 0 3 mg tablet Take 1 tablet (0 3 mg total) by mouth daily Take daily for 21 days then do not take for 7 days   64 tablet 0    Cranberry 450 MG TABS Take by mouth      Loratadine (CLARITIN) 10 MG CAPS Take by mouth      LOTREL 5-20 MG per capsule TAKE 1 CAPSULE BY MOUTH EVERY DAY 90 capsule 1    Multiple Vitamins-Minerals (CENTRUM SILVER ULTRA WOMENS PO) Take 1 tablet by mouth daily      Olopatadine HCl (PAZEO) 0 7 % SOLN Apply to eye      Omega-3 Fatty Acids (FISH OIL) 1,000 mg Take 1 capsule by mouth daily      polyethylene glycol-propylene glycol (SYSTANE ULTRA) 0 4-0 3 % Apply to eye      PREMARIN 0 3 MG tablet TAKE 1 TABLET BY MOUTH ONCE DAILY 5 DAYS PER WEEK 34 tablet 2    PREMARIN 0 3 MG tablet TAKE 1 TABLET BY MOUTH ONCE DAILY 5 DAYS PER WEEK 34 tablet 2    SYNTHROID 25 MCG tablet TAKE 1 TABLET BY MOUTH EVERY DAY 90 tablet 1    SYNTHROID 25 MCG tablet TAKE 1 TABLET BY MOUTH EVERY DAY 90 tablet 1    TOPROL XL 50 MG 24 hr tablet TAKE 1 TABLET BY MOUTH EVERY DAY 90 tablet 1    VYTORIN 10-20 MG per tablet TAKE 1 TABLET BY MOUTH EVERY DAY 90 tablet 1     No current facility-administered medications for this visit  Additional Meds/Supplements: n/a   Barriers to Learning: None   Labs: n/a   Height:   Ht Readings from Last 3 Encounters:   07/23/19 5' 1 5" (1 562 m)   03/26/19 5' 1 5" (1 562 m)   01/25/19 5' 2" (1 575 m)      Weight: Wt Readings from Last 12 Encounters:   09/12/19 69 6 kg (153 lb 6 4 oz)   07/23/19 69 3 kg (152 lb 12 8 oz)   07/23/19 68 9 kg (152 lb)   06/05/19 69 kg (152 lb 3 2 oz)   03/26/19 70 3 kg (155 lb)   01/25/19 71 9 kg (158 lb 9 6 oz)   11/26/18 70 8 kg (156 lb)   07/03/18 72 6 kg (160 lb)   03/20/18 72 8 kg (160 lb 6 4 oz)   01/19/18 71 7 kg (158 lb)   01/15/18 72 6 kg (160 lb)   11/30/17 72 1 kg (159 lb)     Estimated body mass index is 28 52 kg/m² as calculated from the following:    Height as of 7/23/19: 5' 1 5" (1 562 m)  Weight as of this encounter: 69 6 kg (153 lb 6 4 oz)  Wt  Change Since Last Visit: ?Yes     ? xxNo  Amount: n/a      Energy Needs: No calculation needed   Pain Screen: Are you having pain now? No      Goals Achieved:  Eating 3 meals a day but also snacks does more grazing and small meals more than larger things, does not crave sweets, loves her pasta  Going to be a great grandmother  Sleeps well at night, rarely cannot sleep  Tired at night, goes up at 10, once/twice to use bathroom, feels great int he morning  Gets up at 7  Cooks breakfast, 2 eggs 2 pcs toast home fries and 3 sl raines, 2-3c coffee  Does not eat candy and cake  New Goals:   1  Continue current meal and activity plan   2    3        Initial PES:       New PES: No Change      New Problem List:  1 none new   2    3        Assessment:  Feels great knees are the biggest problem she has has help with cleaning, stays active- walks fairly regularly as knees allow, and will walk around  using stair chair bc knees give out on her at times and doesn't want to fall  Does not check Bg at home  Medical Nutrition Therapy Intervention:  ?xIndividualized Meal Plan ? Understanding Lab Values   ? Basic Pathophysiology of Disease ? Food/Medication Interactions   ? xFood Diary ? x Exercise   ? xLifestyle/Behavior Modification Techniques ? Medication, Mechanism of Action   ? Label Reading ? Self Blood Glucose Monitoring   ? xWeight/BMI Goals ? Other -    Other Notes:        Comprehension: ?Excellent  ? xVery Good  ? Good  ? Fair   ? Poor    Receptivity: ?Excellent  ? xVery Good  ? Good  ? Fair   ? Poor    Expected Compliance: ?Excellent  ? xVery Good  ? Good  ? Fair   ? Poor      Labs:  CMP  Lab Results   Component Value Date     06/04/2015    K 4 3 03/21/2019     03/21/2019    CO2 26 03/21/2019    ANIONGAP 9 06/04/2015    BUN 27 (H) 03/21/2019    CREATININE 1 03 03/21/2019    GLUCOSE 131 06/04/2015    GLUF 135 (H) 03/21/2019    CALCIUM 9 1 03/21/2019    AST 29 03/21/2019    ALT 34 03/21/2019    ALKPHOS 60 03/21/2019    PROT 7 3 06/04/2015    BILITOT 0 48 06/04/2015    EGFR 51 03/21/2019       BMP  Lab Results   Component Value Date    GLUCOSE 131 06/04/2015    CALCIUM 9 1 03/21/2019     06/04/2015    K 4 3 03/21/2019    CO2 26 03/21/2019     03/21/2019    BUN 27 (H) 03/21/2019    CREATININE 1 03 03/21/2019       Lipids  Lab Results   Component Value Date    CHOL 142 12/11/2014     Lab Results   Component Value Date    HDL 46 03/21/2019    HDL 45 03/13/2018    HDL 44 09/19/2016     Lab Results   Component Value Date    LDLCALC 38 03/21/2019    LDLCALC 38 03/13/2018    LDLCALC 45 09/19/2016     Lab Results   Component Value Date    TRIG 156 (H) 03/21/2019    TRIG 167 (H) 03/13/2018    TRIG 150 09/19/2016     No results found for: CHOLHDL    Hemoglobin A1C  Lab Results   Component Value Date    HGBA1C 6 5 (H) 03/21/2019       Fasting Glucose  Lab Results   Component Value Date    GLUF 135 (H) 03/21/2019       Insulin     Thyroid  No results found for: TSH, N7ELSLO, Z2BJBHO, THYROIDAB    Hepatic Function Panel  Lab Results   Component Value Date    ALT 34 03/21/2019    AST 29 03/21/2019    ALKPHOS 60 03/21/2019    BILITOT 0 48 06/04/2015       Celiac Disease Antibody Panel  No results found for: ENDOMYSIAL IGA, GLIADIN IGA, GLIADIN IGG, IGA, TISSUE TRANSGLUT AB, TTG IGA   Iron  No results found for: IRON, TIBC, FERRITIN    Vitamins  No results found for: VITAMIN B2   No results found for: NICOTINAMIDE, NICOTINIC ACID   No results found for: VITAMINB6  Lab Results   Component Value Date    [id number] 470 03/13/2018     No results found for: VITB5  No results found for: H4XTFQSX  No results found for: THYROGLB  No results found for: VITAMIN K   No results found for: 25-HYDROXY VIT D   No components found for: VITAMINE     No follow-ups on file      [address] [address] [place] [address] [address]  [facility] [address]  [facility] [id number]

## 2019-09-16 DIAGNOSIS — R23.2 HOT FLASHES: ICD-10-CM

## 2019-09-16 RX ORDER — CONJUGATED ESTROGENS 0.3 MG/1
TABLET, FILM COATED ORAL
Qty: 64 TABLET | Refills: 0 | Status: SHIPPED | OUTPATIENT
Start: 2019-09-16 | End: 2019-12-04

## 2019-09-29 DIAGNOSIS — I10 ESSENTIAL HYPERTENSION: ICD-10-CM

## 2019-09-30 RX ORDER — OLMESARTAN/HYDROCHLOROTHIAZIDE 40-12.5 MG
TABLET ORAL
Qty: 90 TABLET | Refills: 0 | Status: SHIPPED | OUTPATIENT
Start: 2019-09-30 | End: 2019-12-19 | Stop reason: SDUPTHER

## 2019-10-04 DIAGNOSIS — I10 ESSENTIAL HYPERTENSION: ICD-10-CM

## 2019-10-04 RX ORDER — AMLODIPINE BESYLATE/BENAZEPRIL 5 MG-20 MG
CAPSULE ORAL
Qty: 90 CAPSULE | Refills: 1 | Status: SHIPPED | OUTPATIENT
Start: 2019-10-04 | End: 2020-03-16

## 2019-10-04 RX ORDER — METOPROLOL SUCCINATE 50 MG
50 TABLET, EXTENDED RELEASE 24 HR ORAL DAILY
Qty: 90 TABLET | Refills: 1 | Status: SHIPPED | OUTPATIENT
Start: 2019-10-04 | End: 2020-03-16

## 2019-10-07 DIAGNOSIS — I10 ESSENTIAL HYPERTENSION: ICD-10-CM

## 2019-10-08 RX ORDER — OLMESARTAN/HYDROCHLOROTHIAZIDE 40-12.5 MG
TABLET ORAL
Qty: 90 TABLET | Refills: 0 | OUTPATIENT
Start: 2019-10-08

## 2019-10-08 NOTE — TELEPHONE ENCOUNTER
Spoke with patient to let her know that a 90-day supply had already been sent to [facility]  She wants transferred to [facility], pharmacy was contacted and agreed to do so  Please refuse this duplicate request  Thank you

## 2019-10-25 ENCOUNTER — CLINICAL SUPPORT (OUTPATIENT)
Dept: NUTRITION | Facility: HOSPITAL | Age: 84
End: 2019-10-25
Payer: MEDICARE

## 2019-10-25 VITALS — BODY MASS INDEX: 28.78 KG/M2 | WEIGHT: 154.8 LBS

## 2019-10-25 DIAGNOSIS — E11.9 DIABETES MELLITUS WITHOUT COMPLICATION (HCC): Primary | ICD-10-CM

## 2019-10-25 PROCEDURE — 97803 MED NUTRITION INDIV SUBSEQ: CPT

## 2019-10-25 NOTE — PROGRESS NOTES
Follow-Up Nutrition Assessment Form    Patient Name: Nelson Crandall    YOB: 1935    Sex: Female      Follow Up Date: 10/25/2019  Start Time: 10 Stop Time: 1030 Total Minutes: 27     Data:  Present at session: self   Parent/Patient Concerns:    Medical Dx/Reason for Referral:    Past Medical History:   Diagnosis Date    Administration of long-term prophylactic antibiotics 04/29/2014    Need for Prophylactic Antibiotics       Current Outpatient Medications   Medication Sig Dispense Refill    aspirin 81 MG tablet Take 1 tablet by mouth daily      BENICAR HCT 40-12 5 MG per tablet TAKE 1 TABLET BY MOUTH EVERY DAY 90 tablet 0    bimatoprost (LUMIGAN) 0 01 % ophthalmic drops Apply 1 drop to eye Daily      Brinzolamide-Brimonidine (SIMBRINZA) 1-0 2 % SUSP Apply to eye      CELEBREX 200 MG capsule TAKE ONE CAPSULE BY MOUTH DAILY WITH A MEAL 90 capsule 1    Coenzyme Q10 (COQ10) 100 MG CAPS Take 1 capsule by mouth daily      Cranberry 450 MG TABS Take by mouth      Loratadine (CLARITIN) 10 MG CAPS Take by mouth      LOTREL 5-20 MG per capsule TAKE 1 CAPSULE BY MOUTH EVERY DAY 90 capsule 1    Multiple Vitamins-Minerals (CENTRUM SILVER ULTRA WOMENS PO) Take 1 tablet by mouth daily      Olopatadine HCl (PAZEO) 0 7 % SOLN Apply to eye      Omega-3 Fatty Acids (FISH OIL) 1,000 mg Take 1 capsule by mouth daily      polyethylene glycol-propylene glycol (SYSTANE ULTRA) 0 4-0 3 % Apply to eye      PREMARIN 0 3 MG tablet TAKE 1 TABLET BY MOUTH ONCE DAILY 5 DAYS PER WEEK 34 tablet 2    PREMARIN 0 3 MG tablet TAKE 1 TABLET BY MOUTH ONCE DAILY 5 DAYS PER WEEK 34 tablet 2    PREMARIN 0 3 MG tablet TAKE 1 TABLET (0 3 MG TOTAL) BY MOUTH DAILY TAKE DAILY FOR 21 DAYS THEN DO NOT TAKE FOR 7 DAYS   64 tablet 0    SYNTHROID 25 MCG tablet TAKE 1 TABLET BY MOUTH EVERY DAY 90 tablet 1    SYNTHROID 25 MCG tablet TAKE 1 TABLET BY MOUTH EVERY DAY 90 tablet 1    TOPROL XL 50 MG 24 hr tablet Take 1 tablet (50 mg total) by mouth daily 90 tablet 1    VYTORIN 10-20 MG per tablet TAKE 1 TABLET BY MOUTH EVERY DAY 90 tablet 1     No current facility-administered medications for this visit  Additional Meds/Supplements:    Barriers to Learning: None   Labs:    Height: Ht Readings from Last 3 Encounters:   07/23/19 5' 1 5" (1 562 m)   03/26/19 5' 1 5" (1 562 m)   01/25/19 5' 2" (1 575 m)      Weight: Wt Readings from Last 10 Encounters:   10/25/19 70 2 kg (154 lb 12 8 oz)   09/12/19 69 6 kg (153 lb 6 4 oz)   07/23/19 69 3 kg (152 lb 12 8 oz)   07/23/19 68 9 kg (152 lb)   06/05/19 69 kg (152 lb 3 2 oz)   03/26/19 70 3 kg (155 lb)   01/25/19 71 9 kg (158 lb 9 6 oz)   11/26/18 70 8 kg (156 lb)   07/03/18 72 6 kg (160 lb)   03/20/18 72 8 kg (160 lb 6 4 oz)     Estimated body mass index is 28 78 kg/m² as calculated from the following:    Height as of 7/23/19: 5' 1 5" (1 562 m)  Weight as of this encounter: 70 2 kg (154 lb 12 8 oz)  Wt  Change Since Last Visit: []Yes     [x]No  Amount:       Energy Needs: No calculations performed for this visit   Pain Screen: Are you having pain now? No      Goals Achieved:     New Goals:   1 none new continue with same as previous visit   2    3        Initial PES:    Altered Nutrition-Related Laboratory values  related to Kidney, liver, cardiac, endocrine, neurologic, and/or pulmonary dysfunction as evidenced by  Abnormal plasma glucose and/or HgbA1c levels   New PES: No Change      New Problem List:  1 none new   2    3        Assessment:  Does not check BG at home, eliminated cakes, pies, and candy-chocolate or licorice  Does eat 3 meals a day no skipping, snacks as needed (will snack on pretzels if anything  Drinks mainly water, coffee, has mainly elimanated diet coke (always associated with pretzels and chips)  Drinks 2 mugs coffee in AM, and mid afternoon- skim milk only  Pleased with wt, though would like to lose a little if able  sleeping pretty well other than last night       Medical Nutrition Therapy Intervention:  [x]Individualized Meal Plan- like eggs 2 sausage patties and 1 english muffin for B, eats a big B, if out will eat rye and home fries as well;  English muffin with margarine and blueberry spread  Likes hamburgers, no steaks, kevin and salmon 1x/wk each  L[facility] to [facility]   []Understanding Lab Values   []Basic Pathophysiology of Disease []Food/Medication Interactions   []Food Diary [x]Exercise-mainly cleans big house; knees aren't great, walks around the shops using a cart for balance and speed; goes to market twice a week; has decreased stair usage for knee issues   [x]Lifestyle/Behavior Modification Techniques- eats hearty meals, minimal snacking []Medication, Mechanism of Action   []Label Reading []Self Blood Glucose Monitoring   [x]Weight/BMI Goals []Other -    Other Notes:        Comprehension: [x]Excellent  []Very Good  []Good  []Fair   []Poor    Receptivity: [x]Excellent  []Very Good  []Good  []Fair   []Poor    Expected Compliance: [x]Excellent  []Very Good  []Good  []Fair   []Poor      Labs:  CMP  Lab Results   Component Value Date     06/04/2015    K 4 3 03/21/2019     03/21/2019    CO2 26 03/21/2019    ANIONGAP 9 06/04/2015    BUN 27 (H) 03/21/2019    CREATININE 1 03 03/21/2019    GLUCOSE 131 06/04/2015    GLUF 135 (H) 03/21/2019    CALCIUM 9 1 03/21/2019    AST 29 03/21/2019    ALT 34 03/21/2019    ALKPHOS 60 03/21/2019    PROT 7 3 06/04/2015    BILITOT 0 48 06/04/2015    EGFR 51 03/21/2019       BMP  Lab Results   Component Value Date    GLUCOSE 131 06/04/2015    CALCIUM 9 1 03/21/2019     06/04/2015    K 4 3 03/21/2019    CO2 26 03/21/2019     03/21/2019    BUN 27 (H) 03/21/2019    CREATININE 1 03 03/21/2019       Lipids  Lab Results   Component Value Date    CHOL 142 12/11/2014     Lab Results   Component Value Date    HDL 46 03/21/2019    HDL 45 03/13/2018    HDL 44 09/19/2016     Lab Results   Component Value Date    LDLCALC 38 03/21/2019    LDLCALC 38 03/13/2018    LDLCALC 45 09/19/2016     Lab Results   Component Value Date    TRIG 156 (H) 03/21/2019    TRIG 167 (H) 03/13/2018    TRIG 150 09/19/2016     No results found for: CHOLHDL    Hemoglobin A1C  Lab Results   Component Value Date    HGBA1C 6 5 (H) 03/21/2019       Fasting Glucose  Lab Results   Component Value Date    GLUF 135 (H) 03/21/2019       Insulin     Thyroid  No results found for: TSH, T1VINXW, V5XEJCX, THYROIDAB    Hepatic Function Panel  Lab Results   Component Value Date    ALT 34 03/21/2019    AST 29 03/21/2019    ALKPHOS 60 03/21/2019    BILITOT 0 48 06/04/2015       Celiac Disease Antibody Panel  No results found for: ENDOMYSIAL IGA, GLIADIN IGA, GLIADIN IGG, IGA, TISSUE TRANSGLUT AB, TTG IGA   Iron  No results found for: IRON, TIBC, FERRITIN    Vitamins  No results found for: VITAMIN B2   No results found for: NICOTINAMIDE, NICOTINIC ACID   No results found for: VITAMINB6  Lab Results   Component Value Date    [id number] 835 03/13/2018     No results found for: VITB5  No results found for: K4IBFMNV  No results found for: THYROGLB  No results found for: VITAMIN K   No results found for: 25-HYDROXY VIT D   No components found for: VITAMINE     No follow-ups on file      [address] [address] [place] [address] [address]  [address]  [facility] [id number]

## 2019-12-04 ENCOUNTER — OFFICE VISIT (OUTPATIENT)
Dept: FAMILY MEDICINE CLINIC | Facility: CLINIC | Age: 84
End: 2019-12-04
Payer: MEDICARE

## 2019-12-04 VITALS
BODY MASS INDEX: 28.52 KG/M2 | DIASTOLIC BLOOD PRESSURE: 68 MMHG | SYSTOLIC BLOOD PRESSURE: 120 MMHG | HEIGHT: 62 IN | TEMPERATURE: 98.4 F | WEIGHT: 155 LBS

## 2019-12-04 DIAGNOSIS — E11.9 TYPE 2 DIABETES MELLITUS WITHOUT COMPLICATION, WITHOUT LONG-TERM CURRENT USE OF INSULIN (HCC): Primary | ICD-10-CM

## 2019-12-04 DIAGNOSIS — E78.2 MIXED HYPERLIPIDEMIA: ICD-10-CM

## 2019-12-04 DIAGNOSIS — I10 ESSENTIAL HYPERTENSION: ICD-10-CM

## 2019-12-04 DIAGNOSIS — E55.9 VITAMIN D DEFICIENCY: ICD-10-CM

## 2019-12-04 DIAGNOSIS — E06.3 HYPOTHYROIDISM DUE TO HASHIMOTO'S THYROIDITIS: ICD-10-CM

## 2019-12-04 DIAGNOSIS — E53.8 VITAMIN B12 DEFICIENCY: ICD-10-CM

## 2019-12-04 DIAGNOSIS — E03.8 HYPOTHYROIDISM DUE TO HASHIMOTO'S THYROIDITIS: ICD-10-CM

## 2019-12-04 DIAGNOSIS — M17.0 PRIMARY OSTEOARTHRITIS OF BOTH KNEES: ICD-10-CM

## 2019-12-04 DIAGNOSIS — R73.01 IMPAIRED FASTING GLUCOSE: ICD-10-CM

## 2019-12-04 LAB — SL AMB POCT HEMOGLOBIN AIC: 5.9 (ref ?–6.5)

## 2019-12-04 PROCEDURE — 99214 OFFICE O/P EST MOD 30 MIN: CPT | Performed by: FAMILY MEDICINE

## 2019-12-04 PROCEDURE — 83036 HEMOGLOBIN GLYCOSYLATED A1C: CPT | Performed by: FAMILY MEDICINE

## 2019-12-04 NOTE — PROGRESS NOTES
Assessment/Plan:  A1c 5 9  Chronic conditions stable overall other than bilateral knee pain  Medications will be refilled when needed as there being done automatically  Patient have laboratory studies prior to next visit  Follow-up in 4 months  Patient will see Orthopedics       Diagnoses and all orders for this visit:    Type 2 diabetes mellitus without complication, without long-term current use of insulin (HCC)  -     POCT hemoglobin A1c  -     CBC and differential; Future  -     Comprehensive metabolic panel; Future  -     Hemoglobin A1C; Future  -     Lipid panel; Future  -     Microalbumin / creatinine urine ratio  -     TSH, 3rd generation with Free T4 reflex; Future    Impaired fasting glucose    Mixed hyperlipidemia  -     CBC and differential; Future  -     Comprehensive metabolic panel; Future  -     Hemoglobin A1C; Future  -     Lipid panel; Future  -     Microalbumin / creatinine urine ratio  -     TSH, 3rd generation with Free T4 reflex; Future    Vitamin B12 deficiency  -     CBC and differential; Future  -     Comprehensive metabolic panel; Future  -     Hemoglobin A1C; Future  -     Lipid panel; Future  -     Microalbumin / creatinine urine ratio  -     TSH, 3rd generation with Free T4 reflex; Future    Vitamin D deficiency    Essential hypertension  -     CBC and differential; Future  -     Comprehensive metabolic panel; Future  -     Hemoglobin A1C; Future  -     Lipid panel; Future  -     Microalbumin / creatinine urine ratio  -     TSH, 3rd generation with Free T4 reflex; Future    Hypothyroidism due to Hashimoto's thyroiditis  -     CBC and differential; Future  -     Comprehensive metabolic panel; Future  -     Hemoglobin A1C; Future  -     Lipid panel; Future  -     Microalbumin / creatinine urine ratio  -     TSH, 3rd generation with Free T4 reflex; Future    Primary osteoarthritis of both knees  -     CBC and differential; Future  -     Comprehensive metabolic panel;  Future  - Hemoglobin A1C; Future  -     Lipid panel; Future  -     Microalbumin / creatinine urine ratio  -     TSH, 3rd generation with Free T4 reflex; Future  -     Ambulatory referral to Orthopedic Surgery; Future    Other orders  -     co-enzyme Q-10 30 MG capsule; Take 30 mg by mouth daily  -     Probiotic Product (ALIGN PO); Take by mouth daily            Subjective:        Patient ID: Nelson Crandall is a 82 y o  female  Patient follow-up on diabetes hypertension hyperlipidemia hypothyroidism  Patient with chronic knee pain  Patient with difficulty going downstairs  No chest pain or shortness of breath or problems with urination or defecation  All other review systems negative  The following portions of the patient's history were reviewed and updated as appropriate: allergies, current medications, past family history, past medical history, past social history, past surgical history and problem list       Review of Systems   Constitutional: Negative  HENT: Negative  Eyes: Negative  Respiratory: Negative  Cardiovascular: Negative  Gastrointestinal: Negative  Endocrine: Negative  Genitourinary: Negative  Musculoskeletal: Positive for arthralgias  Skin: Negative  Allergic/Immunologic: Negative  Neurological: Negative  Hematological: Negative  Psychiatric/Behavioral: Negative  Objective:               /68 (BP Location: Left arm, Patient Position: Sitting, Cuff Size: Adult)   Temp 98 4 °F (36 9 °C) (Tympanic)   Ht 5' 2" (1 575 m)   Wt 70 3 kg (155 lb)   LMP  (LMP Unknown)   BMI 28 35 kg/m²          Physical Exam   Constitutional: She appears well-developed and well-nourished  No distress  HENT:   Head: Normocephalic  Right Ear: External ear normal    Left Ear: External ear normal    Mouth/Throat: Oropharynx is clear and moist  No oropharyngeal exudate  Eyes: Pupils are equal, round, and reactive to light   EOM are normal  Right eye exhibits no discharge  Left eye exhibits no discharge  No scleral icterus  Neck: Normal range of motion  Neck supple  No thyromegaly present  Cardiovascular: Normal rate, regular rhythm, normal heart sounds and intact distal pulses  Exam reveals no gallop and no friction rub  No murmur heard  Pulmonary/Chest: Effort normal and breath sounds normal  No respiratory distress  She has no wheezes  She has no rales  She exhibits no tenderness  Abdominal: Soft  Bowel sounds are normal  She exhibits no distension  There is no tenderness  There is no rebound and no guarding  Musculoskeletal: She exhibits tenderness  She exhibits no edema  Osteoarthritic changes of left knee greater than right   Lymphadenopathy:     She has no cervical adenopathy  Neurological: She is alert  No cranial nerve deficit  She exhibits normal muscle tone  Coordination normal    Skin: Skin is warm and dry  No rash noted  She is not diaphoretic  No erythema  No pallor  Psychiatric: She has a normal mood and affect  Her behavior is normal  Judgment and thought content normal    Nursing note and vitals reviewed

## 2019-12-12 ENCOUNTER — CLINICAL SUPPORT (OUTPATIENT)
Dept: NUTRITION | Facility: HOSPITAL | Age: 84
End: 2019-12-12
Payer: MEDICARE

## 2019-12-12 VITALS — WEIGHT: 155.6 LBS | BODY MASS INDEX: 28.46 KG/M2

## 2019-12-12 DIAGNOSIS — E11.9 DIABETES MELLITUS WITHOUT COMPLICATION (HCC): Primary | ICD-10-CM

## 2019-12-12 PROCEDURE — 97803 MED NUTRITION INDIV SUBSEQ: CPT

## 2019-12-12 NOTE — PROGRESS NOTES
Follow-Up Nutrition Assessment Form    Patient Name: Raya Sosa    YOB: 1935    Sex: Female      Follow Up Date: 12/12/2019  Start Time: 1005 Stop Time: 1035 Total Minutes: 27     Data:  Present at session: self   Parent/Patient Concerns: DM   Medical Dx/Reason for Referral: DM   Past Medical History:   Diagnosis Date    Administration of long-term prophylactic antibiotics 04/29/2014    Need for Prophylactic Antibiotics       Current Outpatient Medications   Medication Sig Dispense Refill    aspirin 81 MG tablet Take 1 tablet by mouth daily      BENICAR HCT 40-12 5 MG per tablet TAKE 1 TABLET BY MOUTH EVERY DAY 90 tablet 0    bimatoprost (LUMIGAN) 0 01 % ophthalmic drops Apply 1 drop to eye Daily      Brinzolamide-Brimonidine (SIMBRINZA) 1-0 2 % SUSP Apply to eye      CELEBREX 200 MG capsule TAKE ONE CAPSULE BY MOUTH DAILY WITH A MEAL 90 capsule 1    co-enzyme Q-10 30 MG capsule Take 30 mg by mouth daily      Coenzyme Q10 (COQ10) 100 MG CAPS Take 1 capsule by mouth daily      Cranberry 450 MG TABS Take by mouth      Loratadine (CLARITIN) 10 MG CAPS Take by mouth      LOTREL 5-20 MG per capsule TAKE 1 CAPSULE BY MOUTH EVERY DAY 90 capsule 1    Multiple Vitamins-Minerals (CENTRUM SILVER ULTRA WOMENS PO) Take 1 tablet by mouth daily      Olopatadine HCl (PAZEO) 0 7 % SOLN Apply to eye      Omega-3 Fatty Acids (FISH OIL) 1,000 mg Take 1 capsule by mouth daily      polyethylene glycol-propylene glycol (SYSTANE ULTRA) 0 4-0 3 % Apply to eye      PREMARIN 0 3 MG tablet TAKE 1 TABLET BY MOUTH ONCE DAILY 5 DAYS PER WEEK 34 tablet 2    Probiotic Product (ALIGN PO) Take by mouth daily      SYNTHROID 25 MCG tablet TAKE 1 TABLET BY MOUTH EVERY DAY 90 tablet 1    TOPROL XL 50 MG 24 hr tablet Take 1 tablet (50 mg total) by mouth daily 90 tablet 1    VYTORIN 10-20 MG per tablet TAKE 1 TABLET BY MOUTH EVERY DAY 90 tablet 1     No current facility-administered medications for this visit  Additional Meds/Supplements: None new   Barriers to Learning: None   Labs: None new   Height: Ht Readings from Last 3 Encounters:   12/04/19 5' 2" (1 575 m)   07/23/19 5' 1 5" (1 562 m)   03/26/19 5' 1 5" (1 562 m)      Weight: Wt Readings from Last 10 Encounters:   12/12/19 70 6 kg (155 lb 9 6 oz)   12/04/19 70 3 kg (155 lb)   10/25/19 70 2 kg (154 lb 12 8 oz)   09/12/19 69 6 kg (153 lb 6 4 oz)   07/23/19 69 3 kg (152 lb 12 8 oz)   07/23/19 68 9 kg (152 lb)   06/05/19 69 kg (152 lb 3 2 oz)   03/26/19 70 3 kg (155 lb)   01/25/19 71 9 kg (158 lb 9 6 oz)   11/26/18 70 8 kg (156 lb)     Estimated body mass index is 28 46 kg/m² as calculated from the following:    Height as of 12/4/19: 5' 2" (1 575 m)  Weight as of this encounter: 70 6 kg (155 lb 9 6 oz)  Wt  Change Since Last Visit: []Yes     [x]No  Amount:       Energy Needs: No calculations performed for this visit   Pain Screen: Are you having pain now? No      Goals Achieved:3 meals a day watches choices and portions, gets adequate sleep and stays active     New Goals:   1 continue same meal and activity plan   2    3        Initial PES:       New PES: No Change      New Problem List:  1 knee issues, limping   2    3        Assessment:  New A1C 5 9 did in MD office, pleased with drop  Watching portions and watching food choices, lundberg snot eat cookies cake candy bars, eats rye bread and pasta, english muffins  Has even cut back on the pasta  Stays social with activities  Walks daily indoors with        Medical Nutrition Therapy Intervention:  [x]Individualized Meal Plan []Understanding Lab Values   []Basic Pathophysiology of Disease []Food/Medication Interactions   [x]Food Diary [x]Exercise-knee issues right now, but still very active in the house   [x]Lifestyle/Behavior Modification Techniques []Medication, Mechanism of Action   []Label Reading []Self Blood Glucose Monitoring   [x]Weight/BMI Goals []Other -    Other Notes:        Comprehension: []Excellent  [x]Very Good  []Good  []Fair   []Poor    Receptivity: []Excellent  [x]Very Good  []Good  []Fair   []Poor    Expected Compliance: []Excellent  [x]Very Good  []Good  []Fair   []Poor      Labs:  CMP  Lab Results   Component Value Date     06/04/2015    K 4 3 03/21/2019     03/21/2019    CO2 26 03/21/2019    ANIONGAP 9 06/04/2015    BUN 27 (H) 03/21/2019    CREATININE 1 03 03/21/2019    GLUCOSE 131 06/04/2015    GLUF 135 (H) 03/21/2019    CALCIUM 9 1 03/21/2019    AST 29 03/21/2019    ALT 34 03/21/2019    ALKPHOS 60 03/21/2019    PROT 7 3 06/04/2015    BILITOT 0 48 06/04/2015    EGFR 51 03/21/2019       BMP  Lab Results   Component Value Date    GLUCOSE 131 06/04/2015    CALCIUM 9 1 03/21/2019     06/04/2015    K 4 3 03/21/2019    CO2 26 03/21/2019     03/21/2019    BUN 27 (H) 03/21/2019    CREATININE 1 03 03/21/2019       Lipids  Lab Results   Component Value Date    CHOL 142 12/11/2014     Lab Results   Component Value Date    HDL 46 03/21/2019    HDL 45 03/13/2018    HDL 44 09/19/2016     Lab Results   Component Value Date    LDLCALC 38 03/21/2019    LDLCALC 38 03/13/2018    LDLCALC 45 09/19/2016     Lab Results   Component Value Date    TRIG 156 (H) 03/21/2019    TRIG 167 (H) 03/13/2018    TRIG 150 09/19/2016     No results found for: CHOLHDL    Hemoglobin A1C  Lab Results   Component Value Date    HGBA1C 5 9 12/04/2019       Fasting Glucose  Lab Results   Component Value Date    GLUF 135 (H) 03/21/2019       Insulin     Thyroid  No results found for: TSH, X1XSUNN, S3XIKJM, THYROIDAB    Hepatic Function Panel  Lab Results   Component Value Date    ALT 34 03/21/2019    AST 29 03/21/2019    ALKPHOS 60 03/21/2019    BILITOT 0 48 06/04/2015       Celiac Disease Antibody Panel  No results found for: ENDOMYSIAL IGA, GLIADIN IGA, GLIADIN IGG, IGA, TISSUE TRANSGLUT AB, TTG IGA   Iron  No results found for: IRON, TIBC, FERRITIN    Vitamins  No results found for: VITAMIN B2   No results found for: NICOTINAMIDE, NICOTINIC ACID   No results found for: VITAMINB6  Lab Results   Component Value Date    XBJVBTSR89 232 03/13/2018     No results found for: VITB5  No results found for: G8UTKUGQ  No results found for: THYROGLB  No results found for: VITAMIN K   No results found for: 25-HYDROXY VIT D   No components found for: VITAMINE     No follow-ups on file      [id number] [address] [place] [id number]

## 2019-12-18 ENCOUNTER — OFFICE VISIT (OUTPATIENT)
Dept: OBGYN CLINIC | Facility: MEDICAL CENTER | Age: 84
End: 2019-12-18
Payer: MEDICARE

## 2019-12-18 ENCOUNTER — APPOINTMENT (OUTPATIENT)
Dept: RADIOLOGY | Facility: MEDICAL CENTER | Age: 84
End: 2019-12-18
Payer: MEDICARE

## 2019-12-18 VITALS
HEIGHT: 62 IN | DIASTOLIC BLOOD PRESSURE: 73 MMHG | WEIGHT: 155 LBS | BODY MASS INDEX: 28.52 KG/M2 | SYSTOLIC BLOOD PRESSURE: 126 MMHG | HEART RATE: 76 BPM

## 2019-12-18 DIAGNOSIS — M17.0 PRIMARY OSTEOARTHRITIS OF BOTH KNEES: ICD-10-CM

## 2019-12-18 DIAGNOSIS — M17.0 PRIMARY OSTEOARTHRITIS OF BOTH KNEES: Primary | ICD-10-CM

## 2019-12-18 PROCEDURE — 20610 DRAIN/INJ JOINT/BURSA W/O US: CPT | Performed by: ORTHOPAEDIC SURGERY

## 2019-12-18 PROCEDURE — 73564 X-RAY EXAM KNEE 4 OR MORE: CPT

## 2019-12-18 PROCEDURE — 99213 OFFICE O/P EST LOW 20 MIN: CPT | Performed by: ORTHOPAEDIC SURGERY

## 2019-12-18 RX ORDER — LIDOCAINE HYDROCHLORIDE 10 MG/ML
3 INJECTION, SOLUTION INFILTRATION; PERINEURAL
Status: COMPLETED | OUTPATIENT
Start: 2019-12-18 | End: 2019-12-18

## 2019-12-18 RX ORDER — METHYLPREDNISOLONE ACETATE 40 MG/ML
2 INJECTION, SUSPENSION INTRA-ARTICULAR; INTRALESIONAL; INTRAMUSCULAR; SOFT TISSUE
Status: COMPLETED | OUTPATIENT
Start: 2019-12-18 | End: 2019-12-18

## 2019-12-18 RX ADMIN — METHYLPREDNISOLONE ACETATE 2 ML: 40 INJECTION, SUSPENSION INTRA-ARTICULAR; INTRALESIONAL; INTRAMUSCULAR; SOFT TISSUE at 14:50

## 2019-12-18 RX ADMIN — LIDOCAINE HYDROCHLORIDE 3 ML: 10 INJECTION, SOLUTION INFILTRATION; PERINEURAL at 14:50

## 2019-12-18 NOTE — PROGRESS NOTES
Assessment/Plan     1  Primary osteoarthritis of both knees      Orders Placed This Encounter   Procedures    Large joint arthrocentesis: bilateral knee    XR knee 4+ vw left injury    XR knee 4+ vw right injury    Ambulatory referral to Physical Therapy     - On exam, her ROM has decreased  - Conservative treatments were discussed that include cortisone injections, visco injections and keeping the knees moving  These are all to treat symptoms    - She has declined knee braces today due to them being bulky  - Bilateral cortisone injections were administered today  - Instructed to ice and use the Celebrex that is prescribed by her PCP  - Refer to PT for a strengthening and emphasizes on HEP  - She can only have the EUFLEXXA injections, she can call in around 2 months to have those ordered, if the cortisone doesn't help  Return in about 3 months (around 3/18/2020)  I answered all of the patient's questions during the visit and provided education of the patient's condition during the visit  The patient verbalized understanding of the information given and agrees with the plan  This note was dictated using Videostir software  It may contain errors including improperly dictated words  Please contact physician directly for any questions  History of Present Illness   Chief complaint:   Chief Complaint   Patient presents with    Left Knee - Pain    Right Knee - Pain       HPI: Ashley Chan is a 82 y o  female that is here for a consultation for bilateral knee pain referred by her PCP, 12/4/19  She had a Euflexxa injections at that time, that gave her relief for about 2 years  She doesn't recall cortisone injections  She has treated for her left knee pain in the past with Dr Peter Linker  She has been had knee pain for years, she takes Celebrex for the pain and discomfort prescribed by her PCP  The Celebrex 1x/daily  But that hasn't been doing much for the last 6 months   She notes that steps have become very difficult  She had a stair chair placed in the home  Getting up from a sitting position is difficult  She has a sense of instability  She has done therapy in the past and even bought a recumbent bike that she can't use  ROS:    See HPI for musculoskeletal review     All other systems reviewed are negative     Historical Information   Past Medical History:   Diagnosis Date    Administration of long-term prophylactic antibiotics 04/29/2014    Need for Prophylactic Antibiotics     Past Surgical History:   Procedure Laterality Date    TOTAL ABDOMINAL HYSTERECTOMY W/ BILATERAL SALPINGOOPHORECTOMY      with Entercele Repair     Social History   Social History     Substance and Sexual Activity   Alcohol Use Yes    Comment: Social drinker     Social History     Substance and Sexual Activity   Drug Use No     Social History     Tobacco Use   Smoking Status Former Smoker   Smokeless Tobacco Never Used     Family History:   Family History   Problem Relation Age of Onset    No Known Problems Mother        Current Outpatient Medications on File Prior to Visit   Medication Sig Dispense Refill    aspirin 81 MG tablet Take 1 tablet by mouth daily      BENICAR HCT 40-12 5 MG per tablet TAKE 1 TABLET BY MOUTH EVERY DAY 90 tablet 0    bimatoprost (LUMIGAN) 0 01 % ophthalmic drops Apply 1 drop to eye Daily      Brinzolamide-Brimonidine (SIMBRINZA) 1-0 2 % SUSP Apply to eye      CELEBREX 200 MG capsule TAKE ONE CAPSULE BY MOUTH DAILY WITH A MEAL 90 capsule 1    co-enzyme Q-10 30 MG capsule Take 30 mg by mouth daily      Coenzyme Q10 (COQ10) 100 MG CAPS Take 1 capsule by mouth daily      Cranberry 450 MG TABS Take by mouth      Loratadine (CLARITIN) 10 MG CAPS Take by mouth      LOTREL 5-20 MG per capsule TAKE 1 CAPSULE BY MOUTH EVERY DAY 90 capsule 1    Multiple Vitamins-Minerals (CENTRUM SILVER ULTRA WOMENS PO) Take 1 tablet by mouth daily      Olopatadine HCl (PAZEO) 0 7 % SOLN Apply to eye      Omega-3 Fatty Acids (FISH OIL) 1,000 mg Take 1 capsule by mouth daily      polyethylene glycol-propylene glycol (SYSTANE ULTRA) 0 4-0 3 % Apply to eye      PREMARIN 0 3 MG tablet TAKE 1 TABLET BY MOUTH ONCE DAILY 5 DAYS PER WEEK 34 tablet 2    Probiotic Product (ALIGN PO) Take by mouth daily      SYNTHROID 25 MCG tablet TAKE 1 TABLET BY MOUTH EVERY DAY 90 tablet 1    TOPROL XL 50 MG 24 hr tablet Take 1 tablet (50 mg total) by mouth daily 90 tablet 1    VYTORIN 10-20 MG per tablet TAKE 1 TABLET BY MOUTH EVERY DAY 90 tablet 1     No current facility-administered medications on file prior to visit        Allergies   Allergen Reactions    Clindamycin      Reaction Date: [id number];     Other     Penicillins     Sulfa Antibiotics        Current Outpatient Medications on File Prior to Visit   Medication Sig Dispense Refill    aspirin 81 MG tablet Take 1 tablet by mouth daily      BENICAR HCT 40-12 5 MG per tablet TAKE 1 TABLET BY MOUTH EVERY DAY 90 tablet 0    bimatoprost (LUMIGAN) 0 01 % ophthalmic drops Apply 1 drop to eye Daily      Brinzolamide-Brimonidine (SIMBRINZA) 1-0 2 % SUSP Apply to eye      CELEBREX 200 MG capsule TAKE ONE CAPSULE BY MOUTH DAILY WITH A MEAL 90 capsule 1    co-enzyme Q-10 30 MG capsule Take 30 mg by mouth daily      Coenzyme Q10 (COQ10) 100 MG CAPS Take 1 capsule by mouth daily      Cranberry 450 MG TABS Take by mouth      Loratadine (CLARITIN) 10 MG CAPS Take by mouth      LOTREL 5-20 MG per capsule TAKE 1 CAPSULE BY MOUTH EVERY DAY 90 capsule 1    Multiple Vitamins-Minerals (CENTRUM SILVER ULTRA WOMENS PO) Take 1 tablet by mouth daily      Olopatadine HCl (PAZEO) 0 7 % SOLN Apply to eye      Omega-3 Fatty Acids (FISH OIL) 1,000 mg Take 1 capsule by mouth daily      polyethylene glycol-propylene glycol (SYSTANE ULTRA) 0 4-0 3 % Apply to eye      PREMARIN 0 3 MG tablet TAKE 1 TABLET BY MOUTH ONCE DAILY 5 DAYS PER WEEK 34 tablet 2    Probiotic Product (ALIGN PO) Take by mouth daily      SYNTHROID 25 MCG tablet TAKE 1 TABLET BY MOUTH EVERY DAY 90 tablet 1    TOPROL XL 50 MG 24 hr tablet Take 1 tablet (50 mg total) by mouth daily 90 tablet 1    VYTORIN 10-20 MG per tablet TAKE 1 TABLET BY MOUTH EVERY DAY 90 tablet 1     No current facility-administered medications on file prior to visit  Objective   Vitals: Blood pressure 126/73, pulse 76, height 5' 2" (1 575 m), weight 70 3 kg (155 lb)  ,Body mass index is 28 35 kg/m²      PE:  AAOx 3  WDWN  Hearing intact, no drainage from eyes  Regular rate  no audible wheezing  no abdominal distension  LE compartments soft, skin intact    RIGHT knee:  Appearance:  + soft tissue swelling   No ecchymosis  no obvious joint deformity   No effusion  Palpation/Tenderness:  +TTP over medial joint line  No TTP over lateral joint line   + TTP over patella  No TTP over patellar tendon  No TTP over pes anserine bursa  Active Range of Motion:  AROM: 0-115 with patella crepitus  Special Tests:  Medial Stepan's Test:  Negative  Lateral Stepan's Test:  Negative  Apley's compression test:  Negative  Lachman's Test:  negative  Anterior Drawer Test:  Negative  Patellar grind:  Negative  Valgus Stress Test:  negative  Varus Stress Test:  negative   left knee:    Appearance:  no swelling   No ecchymosis  Mild Valgus joint deformity   No effusion  Palpation/Tenderness:  +TTP over medial joint line  No TTP over lateral joint line   + TTP over patella  No TTP over patellar tendon  No TTP over pes anserine bursa  Active Range of Motion:  AROM: 0-115 with patella crepitus and pain  Special Tests:  Medial Stepan's Test:  Negative  Lateral Stepan's Test:  Negative  Apley's compression test:  Negative  Lachman's Test:  negative  Anterior Drawer Test:  Negative  Patellar grind:  Negative  Valgus Stress Test:  negative  Varus Stress Test:  negative     No ipsilateral hip pain with ROM    bilateralLE:    Sensation grossly intact  Palpable posterior tibial pulses  AT/GS/EHL intact    Large joint arthrocentesis: bilateral knee  Date/Time: 12/18/2019 2:50 PM  Consent given by: patient  Site marked: site marked  Timeout: Immediately prior to procedure a time out was called to verify the correct patient, procedure, equipment, support staff and site/side marked as required   Supporting Documentation  Indications: pain   Procedure Details  Location: knee - bilateral knee  Preparation: Patient was prepped and draped in the usual sterile fashion  Needle size: 22 G  Ultrasound guidance: no  Approach: anterolateral    Medications (Right): 2 mL methylPREDNISolone acetate 40 mg/mL; 3 mL lidocaine 1 %Medications (Left): 2 mL methylPREDNISolone acetate 40 mg/mL; 3 mL lidocaine 1 %   Patient tolerance: patient tolerated the procedure well with no immediate complications  Dressing:  Sterile dressing applied        Imaging Studies: I have personally reviewed pertinent films in PACS  bilateralknee:  Advance osteoarthritis in all three compartments        Scribe Attestation    I,:   Ever Bender am acting as a scribe while in the presence of the attending physician :        I,:   Julio Cesar Goins, DO personally performed the services described in this documentation    as scribed in my presence :

## 2019-12-19 DIAGNOSIS — I10 ESSENTIAL HYPERTENSION: ICD-10-CM

## 2019-12-19 DIAGNOSIS — E78.2 MIXED HYPERLIPIDEMIA: ICD-10-CM

## 2019-12-19 DIAGNOSIS — M19.90 ARTHRITIS: ICD-10-CM

## 2019-12-19 DIAGNOSIS — R23.2 HOT FLASHES: ICD-10-CM

## 2019-12-20 RX ORDER — EZETIMIBE/SIMVASTATIN 10 MG-20MG
TABLET ORAL
Qty: 90 TABLET | Refills: 1 | Status: SHIPPED | OUTPATIENT
Start: 2019-12-20 | End: 2020-06-15

## 2019-12-20 RX ORDER — CONJUGATED ESTROGENS 0.3 MG/1
TABLET, FILM COATED ORAL
Qty: 64 TABLET | Refills: 0 | Status: SHIPPED | OUTPATIENT
Start: 2019-12-20 | End: 2020-03-09

## 2019-12-20 RX ORDER — OLMESARTAN/HYDROCHLOROTHIAZIDE 40-12.5 MG
TABLET ORAL
Qty: 90 TABLET | Refills: 0 | Status: SHIPPED | OUTPATIENT
Start: 2019-12-20 | End: 2020-03-16

## 2019-12-23 ENCOUNTER — EVALUATION (OUTPATIENT)
Dept: PHYSICAL THERAPY | Facility: MEDICAL CENTER | Age: 84
End: 2019-12-23
Payer: MEDICARE

## 2019-12-23 DIAGNOSIS — M17.0 PRIMARY OSTEOARTHRITIS OF BOTH KNEES: Primary | ICD-10-CM

## 2019-12-23 PROCEDURE — 97161 PT EVAL LOW COMPLEX 20 MIN: CPT | Performed by: PHYSICAL MEDICINE & REHABILITATION

## 2019-12-23 PROCEDURE — 97112 NEUROMUSCULAR REEDUCATION: CPT | Performed by: PHYSICAL MEDICINE & REHABILITATION

## 2019-12-23 NOTE — PROGRESS NOTES
PT Evaluation     Today's date: 2019  Patient name: Quiana Loya  : 1935  MRN: [id number]  Referring provider: Herber Kaur DO  Dx:   Encounter Diagnosis     ICD-10-CM    1  Primary osteoarthritis of both knees M17 0 Ambulatory referral to Physical Therapy                  Assessment  Assessment details: Patient is a 82 y o  female who reports to outpatient physical therapy with bilateral knee pain  No further referral appears necessary at this time based upon examination results  Probable movement impairment diagnosis of decreased muscular coordination resulting in pathoanatomical symptoms of pain, decreased ROM and decreased strength and limiting ability to ambulate stairs, exercise, and perform household chores  Skilled physical therapy is warranted for the application of the interventions found below in the planned intervention section  Prognosis is good given HEP compliance and attendance to physical therapy 2x for 8 weeks  Please contact me if you have any questions or recommendations  Thank you for the referral and the opportunity to share in [facility] care  Patient verbalized understanding of POC, HEP, and return demonstrated HEP  Impairments: abnormal coordination, abnormal gait, abnormal muscle firing, abnormal or restricted ROM, abnormal movement, activity intolerance, impaired balance, impaired physical strength, lacks appropriate home exercise program, pain with function and weight-bearing intolerance  Understanding of Dx/Px/POC: good   Prognosis: good    Goals  Impairment Goals  - Decrease pain by 50% in 4 weeks  - Increase bilateral flexibility by 50% in 4 weeks  - Increase bilateral lower extremity strength golbally to 4/5 in 4 weeks  - Increase bilateral hip abductor and external rotator strength strength to 3/5  4 weeks      Functional Goals  - Return to Prior Level of Function in 8 weeks  - Patient will be independent with HEP in 8 weeks    Plan  Patient would benefit from: skilled PT  Planned modality interventions: cryotherapy  Planned therapy interventions: joint mobilization, manual therapy, neuromuscular re-education, patient education, strengthening, stretching, therapeutic activities, therapeutic exercise, home exercise program, functional ROM exercises, Escobar taping and postural training  Frequency: 2-3x week  Treatment plan discussed with: patient        Subjective Evaluation    History of Present Illness  Mechanism of injury: Work/School: retired  Home Life: two flights of stairs, going into her house she has one high step which she is able to use railings to assist her, She reports she had a chair lift put in for her  but she is now using it  Hobbies/exercise: walking miles,   Pain location: medial knee, occasionally laterally as well  HPI: Deepthi Hernandez reported she has a Baker's cyst behind her left knee as well as ankle pain  She has experienced bilateral knee pain for years  She reports she is unable to do steps  She reports she is able to walk faster with a cart at [employer] 5-6x a week  She reported she got good relief with cortisone shots that lasted two days  Aggravating factors: stepping up an down curbs and steps, getting out of the car, she reports her pain at night is very challenging,   Relieving factors: rest and sitting   PMH: HTN, DMII controlled via diet,   Pain  Current pain ratin  At best pain ratin  At worst pain rating: 10    Patient Goals  Patient goal: getting up and out of a chair,         Objective     Static Posture     Comments  Sitting Posture:   Forward head, rounded shoulders,     MMTs:  Hip flex (L1-L2): R: 3+/5, L: 3+/5  Knee ext (L3-L4): R: 4-/5, L: 4-/5  Knee flex (S2-S3): R: 3+/5, L: 3+/5  Sidelying Hip Abd (L4-S1): R: 2-/5, L: 2-/5    ROM:  Knee Ext (0): AROM: R: 2, L: 2: PROM: R: 2, L: 2  Knee Flex (0-130): AROM: R: 98, L: 101: PROM: R: 102, L: 103  SLR (0-90): AROM: R: 47, L: 51: PROM: R: 56, L: 61  DF (0-15): AROM: R: 2, L: 2:        Flowsheet Rows      Most Recent Value   PT/OT G-Codes   Current Score  36   Projected Score  49             Precautions: none      Manual  12/23/2019                                                                                 Exercise Diary  12/23                                                                                                                                                                                                                                                                   HEP teaching and return demonstration 10'                Modalities

## 2019-12-26 ENCOUNTER — OFFICE VISIT (OUTPATIENT)
Dept: PHYSICAL THERAPY | Facility: MEDICAL CENTER | Age: 84
End: 2019-12-26
Payer: MEDICARE

## 2019-12-26 DIAGNOSIS — M17.0 PRIMARY OSTEOARTHRITIS OF BOTH KNEES: Primary | ICD-10-CM

## 2019-12-26 PROCEDURE — 97140 MANUAL THERAPY 1/> REGIONS: CPT | Performed by: PHYSICAL MEDICINE & REHABILITATION

## 2019-12-26 PROCEDURE — 97110 THERAPEUTIC EXERCISES: CPT | Performed by: PHYSICAL MEDICINE & REHABILITATION

## 2019-12-26 NOTE — PROGRESS NOTES
Daily Note     Today's date: 2019  Patient name: Fely Solorzano  : 1935  MRN: [id number]  Referring provider: Keshawn Barbosa DO  Dx:   Encounter Diagnosis     ICD-10-CM    1  Primary osteoarthritis of both knees M17 0                   Subjective: Stu Mccord reported "I have been getting cramps with my exercises and my knees were sore but then felt better "      Objective: See treatment diary below      Assessment: Tolerated treatment well  Patient would benefit from continued PT  eFly Solorzano was able to initiate her therapy program which shows progress towards her goals  Stu Mccord reported a decrease in pain and tightness following quad stretching  Plan: Continue per plan of care        Precautions: none      Manual  2019           [facility]  JR                                                                   Exercise Diary             Bridges  3x10           Standing SLR  3x10           Standing Hip Ext  3x10           Side Lying Hip Abd  3x10           Prone Quad Stretch  4x30"                                                                                                                                                                                                 HEP teaching and return demonstration 10'                Modalities

## 2020-01-02 ENCOUNTER — OFFICE VISIT (OUTPATIENT)
Dept: PHYSICAL THERAPY | Facility: MEDICAL CENTER | Age: 85
End: 2020-01-02
Payer: MEDICARE

## 2020-01-02 DIAGNOSIS — M17.0 PRIMARY OSTEOARTHRITIS OF BOTH KNEES: Primary | ICD-10-CM

## 2020-01-02 PROCEDURE — 97110 THERAPEUTIC EXERCISES: CPT | Performed by: PHYSICAL MEDICINE & REHABILITATION

## 2020-01-02 PROCEDURE — 97140 MANUAL THERAPY 1/> REGIONS: CPT | Performed by: PHYSICAL MEDICINE & REHABILITATION

## 2020-01-02 PROCEDURE — 97112 NEUROMUSCULAR REEDUCATION: CPT | Performed by: PHYSICAL MEDICINE & REHABILITATION

## 2020-01-02 NOTE — PROGRESS NOTES
Daily Note     Today's date: 2020  Patient name: Russell Gutierrez  : 1935  MRN: [id number]  Referring provider: Johnnie Florence DO  Dx:   Encounter Diagnosis     ICD-10-CM    1  Primary osteoarthritis of both knees M17 0                 Subjective: Jose F Sandoval reported "My left knee is feeling better but my right knee is bothering me some "      Objective: See treatment diary below      Assessment: Tolerated treatment well  Patient would benefit from continued PT  Jose F Sandoval was able to progress her therapy program as seen below  This shows progress towards her goals  At the end of the session she reported "I feel better than I did when I came in "    Plan: Continue per plan of care        Precautions: none      Manual  2019          Evy Klein                                                                  Exercise Diary            Bridges  3x10 10# 3x10          Standing SLR  3x10 1# 3x10          Standing Hip Ext  3x10 1# 3x10          Side Lying Hip Abd  3x10 1# 3x10          Prone Quad Stretch  4x30" 4x30"                                                                                                                                                                                                HEP teaching and return demonstration 10'                Modalities

## 2020-01-03 ENCOUNTER — OFFICE VISIT (OUTPATIENT)
Dept: PHYSICAL THERAPY | Facility: MEDICAL CENTER | Age: 85
End: 2020-01-03
Payer: MEDICARE

## 2020-01-03 DIAGNOSIS — M17.0 PRIMARY OSTEOARTHRITIS OF BOTH KNEES: Primary | ICD-10-CM

## 2020-01-03 PROCEDURE — 97110 THERAPEUTIC EXERCISES: CPT | Performed by: PHYSICAL MEDICINE & REHABILITATION

## 2020-01-03 PROCEDURE — 97140 MANUAL THERAPY 1/> REGIONS: CPT | Performed by: PHYSICAL MEDICINE & REHABILITATION

## 2020-01-03 NOTE — PROGRESS NOTES
Daily Note     Today's date: 1/3/2020  Patient name: Fely Solorzano  : 1935  MRN: [id number]  Referring provider: Keshawn Barbosa DO  Dx:   Encounter Diagnosis     ICD-10-CM    1  Primary osteoarthritis of both knees M17 0                 Subjective: Stu Mccord reported "my right knee is feeling much better today, but my left knee is hurting a lot more, I think this is because my foot is bothering me and it is making me walk differently "      Objective: See treatment diary below      Assessment: Tolerated treatment well  Patient would benefit from continued PT  Stu Mccord reported a decrease in pain in her knees following IASTM  Due to her subjective report of foot pain her therapy program was not advanced to standing exercises  Plan: Continue per plan of care        Precautions: none      Manual  2019 2019 2020 1/3/2020         Kevin Hackett JR                                                                 Exercise Diary  12/23 12/26 1/2 1/3         Bridges  3x10 10# 3x10 15# 3x10         Standing SLR  3x10 1# 3x10 2# 3x10         Standing Hip Ext  3x10 1# 3x10 2# 3x10         Side Lying Hip Abd  3x10 1# 3x10 2# 3x10         Prone Quad Stretch  4x30" 4x30" 4x30"                                                                                                                                                                                               HEP teaching and return demonstration 10'                Modalities

## 2020-01-07 ENCOUNTER — OFFICE VISIT (OUTPATIENT)
Dept: PHYSICAL THERAPY | Facility: MEDICAL CENTER | Age: 85
End: 2020-01-07
Payer: MEDICARE

## 2020-01-07 DIAGNOSIS — M17.0 PRIMARY OSTEOARTHRITIS OF BOTH KNEES: Primary | ICD-10-CM

## 2020-01-07 PROCEDURE — 97140 MANUAL THERAPY 1/> REGIONS: CPT | Performed by: PHYSICAL MEDICINE & REHABILITATION

## 2020-01-07 PROCEDURE — 97110 THERAPEUTIC EXERCISES: CPT | Performed by: PHYSICAL MEDICINE & REHABILITATION

## 2020-01-07 NOTE — PROGRESS NOTES
Daily Note     Today's date: 2020  Patient name: Suha Fernandez  : 1935  MRN: [id number]  Referring provider: Jerome Ty DO  Dx:   Encounter Diagnosis     ICD-10-CM    1  Primary osteoarthritis of both knees M17 0                   Subjective: Leona Ibrahim reported "      Objective: See treatment diary below      Assessment: Tolerated treatment well  Patient would benefit from continued PT  Leona Ibrahim was unable to add sit to stands to her therapy program secondary to pain  Leg Press was utilized instead of sit to stands  She reported slight discomfort with leg press but denied notable pain  She did report some discomfort along the posterior portions of her knees following leg press so IASTM rolling was utilized  Skin integrity was checked pre and post modalities with no abnormal findings  Plan: Continue per plan of care        Precautions: none      Manual  2019 2019 2020 1/3/2020 2020        [facility]  Esta Jeans JR                                                                Exercise Diary  12/23 12/26 1/2 1/3 1/7        Bridges  3x10 10# 3x10 15# 3x10 20# 3x10        Standing SLR  3x10 1# 3x10 2# 3x10 2# 3x10        Standing Hip Ext  3x10 1# 3x10 2# 3x10         Side Lying Hip Abd  3x10 1# 3x10 2# 3x10 2# 3x10        Prone Quad Stretch  4x30" 4x30" 4x30" 4x30"        Leg Press     30# 3x10                                                                                                                                                                                 HEP teaching and return demonstration 10'                Modalities              CP     10'

## 2020-01-10 ENCOUNTER — OFFICE VISIT (OUTPATIENT)
Dept: PHYSICAL THERAPY | Facility: MEDICAL CENTER | Age: 85
End: 2020-01-10
Payer: MEDICARE

## 2020-01-10 DIAGNOSIS — E11.9 TYPE 2 DIABETES MELLITUS WITHOUT COMPLICATION, WITHOUT LONG-TERM CURRENT USE OF INSULIN (HCC): Primary | ICD-10-CM

## 2020-01-10 DIAGNOSIS — M17.0 PRIMARY OSTEOARTHRITIS OF BOTH KNEES: Primary | ICD-10-CM

## 2020-01-10 PROCEDURE — 97140 MANUAL THERAPY 1/> REGIONS: CPT | Performed by: PHYSICAL MEDICINE & REHABILITATION

## 2020-01-10 PROCEDURE — 97110 THERAPEUTIC EXERCISES: CPT | Performed by: PHYSICAL MEDICINE & REHABILITATION

## 2020-01-10 NOTE — PROGRESS NOTES
Daily Note     Today's date: 1/10/2020  Patient name: Niru Dodd  : 1935  MRN: [id number]  Referring provider: Fernie Linares DO  Dx:   Encounter Diagnosis     ICD-10-CM    1  Primary osteoarthritis of both knees M17 0                   Subjective: Jerel Landis reported "I am able to sleep and walk so much better, I am now able to go out to breakfast with my friends today which I would not have been unable to do a few weeks ago "      Objective: See treatment diary below      Assessment: Tolerated treatment well  Patient would benefit from continued PT  Jerel Landis was able to increase her resistance as seen below  She does continue to need verbal cueing for proper stretching duration  She is still unable to perform supine SLRs  Plan: Continue per plan of care        Precautions: none      Manual  2019 2019 2020 1/3/2020 2020 1/10/2020       [facility]  Carlos Alex JR                                                               Exercise Diary  12/23 12/26 1/2 1/3 1/7 1/10       Bridges  3x10 10# 3x10 15# 3x10 20# 3x10 25#       Standing SLR  3x10 1# 3x10 2# 3x10 2# 3x10 3# 3x10       Standing Hip Ext  3x10 1# 3x10 2# 3x10         Side Lying Hip Abd  3x10 1# 3x10 2# 3x10 2# 3x10 3# 3x10       Prone Quad Stretch  4x30" 4x30" 4x30" 4x30" 4x30"       Leg Press     30# 3x10 30# 3x10                                                                                                                                                                                HEP teaching and return demonstration 10'                Modalities              CP     10'

## 2020-01-13 ENCOUNTER — OFFICE VISIT (OUTPATIENT)
Dept: PHYSICAL THERAPY | Facility: MEDICAL CENTER | Age: 85
End: 2020-01-13
Payer: MEDICARE

## 2020-01-13 DIAGNOSIS — M17.0 PRIMARY OSTEOARTHRITIS OF BOTH KNEES: Primary | ICD-10-CM

## 2020-01-13 PROCEDURE — 97140 MANUAL THERAPY 1/> REGIONS: CPT | Performed by: PHYSICAL MEDICINE & REHABILITATION

## 2020-01-13 PROCEDURE — 97112 NEUROMUSCULAR REEDUCATION: CPT | Performed by: PHYSICAL MEDICINE & REHABILITATION

## 2020-01-13 NOTE — PROGRESS NOTES
Daily Note     Today's date: 2020  Patient name: Rosalinda Nolen  : 1935  MRN: [id number]  Referring provider: Dom Holley DO  Dx:   Encounter Diagnosis     ICD-10-CM    1  Primary osteoarthritis of both knees M17 0                   Subjective: Nia Emmanuel reported "I think I did too many exercises over the weekend and now the front of my hips are bothering me "      Objective: See treatment diary below      Assessment: Tolerated treatment well  Patient would benefit from continued PT  Due to her subjective report Trinh's therapy program was not advanced during today's session  Following IASTM, stretching, light strengthening and ice she reported "I feel much better than when I came in " Skin integrity was checked pre and post modalities with no abnormal findings  Plan: Continue per plan of care        Precautions: none      Manual  2019 2019 2020 1/3/2020 2020 1/10/2020 2020      [facility]  Aylin Dawson JR                                                              Exercise Diary  12/23 12/26 1/2 1/3 1/7 1/10 1/13      Bridges  3x10 10# 3x10 15# 3x10 20# 3x10 25#       Standing SLR  3x10 1# 3x10 2# 3x10 2# 3x10 3# 3x10       Standing Hip Ext  3x10 1# 3x10 2# 3x10         Side Lying Hip Abd  3x10 1# 3x10 2# 3x10 2# 3x10 3# 3x10       Prone Quad Stretch  4x30" 4x30" 4x30" 4x30" 4x30"       Leg Press     30# 3x10 30# 3x10       Hooklying Hip Abd       Grn 3x15                                                                                                                                                                  HEP teaching and return demonstration 10'                Modalities              CP     10'  10'

## 2020-01-16 ENCOUNTER — OFFICE VISIT (OUTPATIENT)
Dept: PHYSICAL THERAPY | Facility: MEDICAL CENTER | Age: 85
End: 2020-01-16
Payer: MEDICARE

## 2020-01-16 DIAGNOSIS — M17.0 PRIMARY OSTEOARTHRITIS OF BOTH KNEES: Primary | ICD-10-CM

## 2020-01-16 PROCEDURE — 97140 MANUAL THERAPY 1/> REGIONS: CPT | Performed by: PHYSICAL MEDICINE & REHABILITATION

## 2020-01-16 PROCEDURE — 97112 NEUROMUSCULAR REEDUCATION: CPT | Performed by: PHYSICAL MEDICINE & REHABILITATION

## 2020-01-16 PROCEDURE — 97110 THERAPEUTIC EXERCISES: CPT | Performed by: PHYSICAL MEDICINE & REHABILITATION

## 2020-01-16 NOTE — PROGRESS NOTES
Daily Note     Today's date: 2020  Patient name: Quiana Loya  : 1935  MRN: [id number]  Referring provider: Herber Kaur DO  Dx:   Encounter Diagnosis     ICD-10-CM    1  Primary osteoarthritis of both knees M17 0                   Subjective: An Savage reported "I am doing much better, the only thing hard now is kneeling and getting up steps and curbs "      Objective: See treatment diary below      Assessment: Tolerated treatment well  Patient would benefit from continued PT  An Savage was able to progress her leg press which shows progress towards her goals  She denied an increase in pain with the progression  Plan: Continue per plan of care        Precautions: none      Manual  2019 2019 2020 1/3/2020 2020 1/10/2020 2020 2020     [facility]  Brina Escobedo JR                                                             Exercise Diary  12/23 12/26 1/2 1/3 1/7 1/10 1/13 1/16     Bridges  3x10 10# 3x10 15# 3x10 20# 3x10 25# 3x10  25# 3x10     Standing SLR  3x10 1# 3x10 2# 3x10 2# 3x10 3# 3x10       Standing Hip Ext  3x10 1# 3x10 2# 3x10         Side Lying Hip Abd  3x10 1# 3x10 2# 3x10 2# 3x10 3# 3x10  3# 3x10     Prone Quad Stretch  4x30" 4x30" 4x30" 4x30" 4x30"  4x30"     Leg Press     30# 3x10 30# 3x10  40# 3x10     Hooklying Hip Abd        Grn 3x12                                                                                                                                                                 HEP teaching and return demonstration 10'                Modalities              CP     10'  10'

## 2020-01-20 ENCOUNTER — EVALUATION (OUTPATIENT)
Dept: PHYSICAL THERAPY | Facility: MEDICAL CENTER | Age: 85
End: 2020-01-20
Payer: MEDICARE

## 2020-01-20 DIAGNOSIS — M17.0 PRIMARY OSTEOARTHRITIS OF BOTH KNEES: Primary | ICD-10-CM

## 2020-01-20 PROCEDURE — 97110 THERAPEUTIC EXERCISES: CPT | Performed by: PHYSICAL MEDICINE & REHABILITATION

## 2020-01-20 PROCEDURE — 97112 NEUROMUSCULAR REEDUCATION: CPT | Performed by: PHYSICAL MEDICINE & REHABILITATION

## 2020-01-20 PROCEDURE — 97140 MANUAL THERAPY 1/> REGIONS: CPT | Performed by: PHYSICAL MEDICINE & REHABILITATION

## 2020-01-20 NOTE — PROGRESS NOTES
PT Re-Evaluation     Today's date: 2020  Patient name: iLbra Holt  : 1935  MRN: [id number]  Referring provider: Mitul Agustin DO  Dx:   Encounter Diagnosis     ICD-10-CM    1  Primary osteoarthritis of both knees M17 0                   Assessment  Assessment details: Patient is a 82 y o  female who reports to outpatient physical therapy with bilateral knee pain  Yesenia Sharp has made good progress towards all of her goals  However, she does continue to have deficits in those areas  Skilled physical therapy continues to be warranted to help Yesenia Sharp continue to make progress towards her goals  Prognosis is good given HEP compliance and attendance to physical therapy 2x for 4 weeks  Please contact me if you have any questions or recommendations  Thank you for the referral and the opportunity to share in [facility] care  Patient verbalized understanding of POC, HEP, and return demonstrated HEP  Impairments: abnormal coordination, abnormal gait, abnormal muscle firing, abnormal or restricted ROM, abnormal movement, activity intolerance, impaired balance, impaired physical strength, lacks appropriate home exercise program, pain with function and weight-bearing intolerance  Understanding of Dx/Px/POC: good   Prognosis: good    Goals  Impairment Goals  - Decrease pain by 50% in 4 weeks  - Increase bilateral flexibility by 50% in 4 weeks  - Increase bilateral lower extremity strength golbally to 4/5 in 4 weeks  - Increase bilateral hip abductor and external rotator strength strength to 3/5  4 weeks      Functional Goals  - Return to Prior Level of Function in 8 weeks  - Patient will be independent with HEP in 8 weeks    Plan  Patient would benefit from: skilled PT  Planned modality interventions: cryotherapy  Planned therapy interventions: joint mobilization, manual therapy, neuromuscular re-education, patient education, strengthening, stretching, therapeutic activities, therapeutic exercise, home exercise program, functional ROM exercises, Escobar taping and postural training  Frequency: 2-3x week  Treatment plan discussed with: patient        Subjective Evaluation    History of Present Illness  Mechanism of injury: Odin Riley reported "I am feeling pretty good right now "  Pain  Current pain ratin  At best pain ratin  At worst pain ratin    Patient Goals  Patient goal: getting up and out of a chair,         Objective     Static Posture     Comments  Sitting Posture:   Forward head, rounded shoulders,     MMTs:  Hip flex (L1-L2): R: 4/5, L: 4/5  Knee ext (L3-L4): R: 4/5, L: 4+/5  Knee flex (S2-S3): R: 4-/5 - painful, L: 4/5  Sidelying Hip Abd (L4-S1): R: 3+/5, L: 4/5    ROM:  Knee Ext (0): AROM: R: 2, L: 1: PROM: R: 2, L: 0  Knee Flex (0-130): AROM: R: 109, L: 114: PROM: R: 112, L: 118  SLR (0-90): AROM: R: 75, L: 75: PROM: R: 88, L: 88  DF (0-15): AROM: R: 6, L: 6:             Precautions: none      Manual  2019 2019 2020 1/3/2020 2020 1/10/2020 2020 2020 2020    [facility]  Melvina Scheuermann JR                                                            Exercise Diary  12/23 12/26 1/2 1/3 1/7 1/10 1/13 1/16 1/20    Bridges  3x10 10# 3x10 15# 3x10 20# 3x10 25# 3x10  25# 3x10 25# 3x12    Standing SLR  3x10 1# 3x10 2# 3x10 2# 3x10 3# 3x10       Standing Hip Ext  3x10 1# 3x10 2# 3x10         Side Lying Hip Abd  3x10 1# 3x10 2# 3x10 2# 3x10 3# 3x10  3# 3x10 2# 3x12    Prone Quad Stretch  4x30" 4x30" 4x30" 4x30" 4x30"  4x30" 4x30"    Leg Press     30# 3x10 30# 3x10  40# 3x10 40# 3x10    Hooklying Hip Abd        Grn 3x12     SLRs         2# 3x12                                                                                                                                                   HEP teaching and return demonstration 10'                Modalities              CP     10'  10'

## 2020-01-23 ENCOUNTER — OFFICE VISIT (OUTPATIENT)
Dept: PHYSICAL THERAPY | Facility: MEDICAL CENTER | Age: 85
End: 2020-01-23
Payer: MEDICARE

## 2020-01-23 DIAGNOSIS — M17.0 PRIMARY OSTEOARTHRITIS OF BOTH KNEES: Primary | ICD-10-CM

## 2020-01-23 PROCEDURE — 97112 NEUROMUSCULAR REEDUCATION: CPT | Performed by: PHYSICAL THERAPIST

## 2020-01-23 PROCEDURE — 97140 MANUAL THERAPY 1/> REGIONS: CPT | Performed by: PHYSICAL THERAPIST

## 2020-01-23 PROCEDURE — 97110 THERAPEUTIC EXERCISES: CPT | Performed by: PHYSICAL THERAPIST

## 2020-01-23 NOTE — PROGRESS NOTES
PT Re-Evaluation     Today's date: 2020  Patient name: Broadus Lennox  : 1935  MRN: [id number]  Referring provider: Cyrus Erazo DO  Dx:   Encounter Diagnosis     ICD-10-CM    1  Primary osteoarthritis of both knees M17 0                 Subjective: Abhishek Costello states she was taking down holiday decorations yesterday and feels she may have "overdone it"  She states she had muscle soreness with the progressions last visit  Overall, patient reports she is very pleased with her progress since participating in physical therapy  Objective: See treatment diary below      Assessment: Tolerated treatment well  Patient fatigued with exercise performance  Held on leg press secondary to pain in the right knee that would no improve despite seat and foot adjustment  Patient will benefit from continued PT services to improve strength and functional mobility  Plan: Continue per plan of care        Precautions: none      Manual  2019 2019 2020 1/3/2020 2020 1/10/2020 2020 2020 2020 1/23   [facility]  Merle Turner JR, JR CK                                                           Exercise Diary  12/23 12/26 1/2 1/3 1/7 1/10 1/13 1/16 1/20 1/23   Bridges  3x10 10# 3x10 15# 3x10 20# 3x10 25# 3x10  25# 3x10 25# 3x12 25# 3x12   Standing SLR  3x10 1# 3x10 2# 3x10 2# 3x10 3# 3x10       Standing Hip Ext  3x10 1# 3x10 2# 3x10         Side Lying Hip Abd  3x10 1# 3x10 2# 3x10 2# 3x10 3# 3x10  3# 3x10 2# 3x12 2# 3x12   Prone Quad Stretch  4x30" 4x30" 4x30" 4x30" 4x30"  4x30" 4x30" 4x30"   Leg Press     30# 3x10 30# 3x10  40# 3x10 40# 3x10 Pain- held   Hooklying Hip Abd        Grn 3x12  Green 3x12   SLRs         2# 3x12 2# 3x12                                                                                                                                                  HEP teaching and return demonstration 10'                Modalities              CP     10'  10'

## 2020-01-27 ENCOUNTER — OFFICE VISIT (OUTPATIENT)
Dept: PHYSICAL THERAPY | Facility: MEDICAL CENTER | Age: 85
End: 2020-01-27
Payer: MEDICARE

## 2020-01-27 DIAGNOSIS — M17.0 PRIMARY OSTEOARTHRITIS OF BOTH KNEES: Primary | ICD-10-CM

## 2020-01-27 PROCEDURE — 97140 MANUAL THERAPY 1/> REGIONS: CPT | Performed by: PHYSICAL MEDICINE & REHABILITATION

## 2020-01-27 PROCEDURE — 97110 THERAPEUTIC EXERCISES: CPT | Performed by: PHYSICAL MEDICINE & REHABILITATION

## 2020-01-27 PROCEDURE — 97112 NEUROMUSCULAR REEDUCATION: CPT | Performed by: PHYSICAL MEDICINE & REHABILITATION

## 2020-01-27 NOTE — PROGRESS NOTES
Daily Note     Today's date: 2020  Patient name: Benjamin Ho  : 1935  MRN: [id number]  Referring provider: Shirley Fraga DO  Dx:   Encounter Diagnosis     ICD-10-CM    1  Primary osteoarthritis of both knees M17 0                   Subjective: Anice Goldmann reported "I am doing okay, but I think I have been sick and that is really tiring me out "      Objective: See treatment diary below      Assessment: Tolerated treatment well  Patient would benefit from continued PT  Anice Goldmann was able to add standing calf stretching to her therapy program which shows progress towards her goals  Following the stretching she reported "this feels pretty good and I think it is taking some pressure off of my knees "       Plan: Continue per plan of care        Precautions: none      Manual  2019 2019 2020 1/3/2020 2020 1/10/2020 2020 2020 2020 2020   [facility]  Jeb Tenorio JR                                                           Exercise Diary  12/23 12/26 1/2 1/3 1/7 1/10 1/13 1/16 1/20 1/27   Bridges  3x10 10# 3x10 15# 3x10 20# 3x10 25# 3x10  25# 3x10 25# 3x12 25# 3x12   Standing SLR  3x10 1# 3x10 2# 3x10 2# 3x10 3# 3x10       Standing Hip Ext  3x10 1# 3x10 2# 3x10         Side Lying Hip Abd  3x10 1# 3x10 2# 3x10 2# 3x10 3# 3x10  3# 3x10 2# 3x12    Prone Quad Stretch  4x30" 4x30" 4x30" 4x30" 4x30"  4x30" 4x30" 4x30"   Leg Press     30# 3x10 30# 3x10  40# 3x10 40# 3x10 40# 3x10   Hooklying Hip Abd        Grn 3x12     SLRs         2# 3x12 2# 3x12   Standing Calf Stretch          4x30"x2                                                                                                                                     HEP teaching and return demonstration 10'                Modalities              CP     10'  10'

## 2020-01-30 ENCOUNTER — OFFICE VISIT (OUTPATIENT)
Dept: PHYSICAL THERAPY | Facility: MEDICAL CENTER | Age: 85
End: 2020-01-30
Payer: MEDICARE

## 2020-01-30 DIAGNOSIS — M17.0 PRIMARY OSTEOARTHRITIS OF BOTH KNEES: Primary | ICD-10-CM

## 2020-01-30 PROCEDURE — 97140 MANUAL THERAPY 1/> REGIONS: CPT | Performed by: PHYSICAL MEDICINE & REHABILITATION

## 2020-01-30 PROCEDURE — 97112 NEUROMUSCULAR REEDUCATION: CPT | Performed by: PHYSICAL MEDICINE & REHABILITATION

## 2020-01-30 PROCEDURE — 97110 THERAPEUTIC EXERCISES: CPT | Performed by: PHYSICAL MEDICINE & REHABILITATION

## 2020-01-30 NOTE — PROGRESS NOTES
Daily Note     Today's date: 2020  Patient name: Sierra Carter  : 1935  MRN: [id number]  Referring provider: Jaelyn Dixon DO  Dx:   Encounter Diagnosis     ICD-10-CM    1  Primary osteoarthritis of both knees M17 0                   Subjective: Alejandra Oas reported "I am doing better all of the time "      Objective: See treatment diary below      Assessment: Tolerated treatment well  Patient would benefit from continued PT  Alejandra Oas was able to add clam shells to her therapy program which shows progress towards her goals  Alejandra Oas continues to report a  in pain following IASTM  Plan: Continue per plan of care        Precautions: none      Manual  2020  2020 1/3/2020 2020 1/10/2020 2020 2020 2020 2020   IASTM Campbell Arellano JR                                                           Exercise Diary  1/30  1/2 1/3 1/7 1/10 1/13 1/16 1/20 1/27   Bridges 25# 3x12  10# 3x10 15# 3x10 20# 3x10 25# 3x10  25# 3x10 25# 3x12 25# 3x12   Standing SLR 3x# 3x10  1# 3x10 2# 3x10 2# 3x10 3# 3x10       Standing Hip Ext   1# 3x10 2# 3x10         Side Lying Hip Abd   1# 3x10 2# 3x10 2# 3x10 3# 3x10  3# 3x10 2# 3x12    Prone Quad Stretch 4x30"  4x30" 4x30" 4x30" 4x30"  4x30" 4x30" 4x30"   Leg Press 40# 3x10    30# 3x10 30# 3x10  40# 3x10 40# 3x10 40# 3x10   Hooklying Hip Abd        Grn 3x12     SLRs         2# 3x12 2# 3x12   Standing Calf Stretch 4x30" x2         4x30"x2   Clam Shells 3x10                                                                                                                                 HEP teaching and return demonstration 10'                Modalities              CP     10'  10'

## 2020-02-04 ENCOUNTER — OFFICE VISIT (OUTPATIENT)
Dept: PHYSICAL THERAPY | Facility: MEDICAL CENTER | Age: 85
End: 2020-02-04
Payer: MEDICARE

## 2020-02-04 DIAGNOSIS — M17.0 PRIMARY OSTEOARTHRITIS OF BOTH KNEES: Primary | ICD-10-CM

## 2020-02-04 PROCEDURE — 97110 THERAPEUTIC EXERCISES: CPT | Performed by: PHYSICAL MEDICINE & REHABILITATION

## 2020-02-04 PROCEDURE — 97140 MANUAL THERAPY 1/> REGIONS: CPT | Performed by: PHYSICAL MEDICINE & REHABILITATION

## 2020-02-04 NOTE — PROGRESS NOTES
Daily Note     Today's date: 2020  Patient name: Estrella Lo  : 1935  MRN: [id number]  Referring provider: Shukri Lomax DO  Dx:   Encounter Diagnosis     ICD-10-CM    1  Primary osteoarthritis of both knees M17 0                   Subjective: Brayan Hope reported "I am doing better, I can sleep better, the only real issue I have now is trying to do the stairs and steps "      Objective: See treatment diary below      Assessment: Tolerated treatment well  Patient would benefit from continued PT  Brayan Hope was able to add step ups and step downs which shows progress towards her goals  She needed verbal cueing for proper knee alignment during step ups and she was unable to perform 8" steps without pain  Plan: Continue per plan of care        Precautions: none      Manual  2020 2020   2020 1/10/2020 2020 2020 2020 2020   IAS Carlos Manuel Holbrook JR                                                           Exercise Diary  1/30 2/4   1/7 1/10 1/13 1/16 1/20 1/27   Bridges 25# 3x12 25# 3x12   20# 3x10 25# 3x10  25# 3x10 25# 3x12 25# 3x12   Standing SLR 3x# 3x10 3# 3x10   2# 3x10 3# 3x10       Standing Hip Ext             Side Lying Hip Abd     2# 3x10 3# 3x10  3# 3x10 2# 3x12    Prone Quad Stretch 4x30" 4x30"   4x30" 4x30"  4x30" 4x30" 4x30"   Leg Press 40# 3x10 40# 3x10   30# 3x10 30# 3x10  40# 3x10 40# 3x10 40# 3x10   Hooklying Hip Abd        Grn 3x12     SLRs         2# 3x12 2# 3x12   Standing Calf Stretch 4x30" x2 4x30"x2        4x30"x2   Clam Shells 3x10 3x10           Step Ups  3x10           Step Downs  3x10                                                                                                      HEP teaching and return demonstration 10'                Modalities              CP     10'  10'

## 2020-02-07 ENCOUNTER — OFFICE VISIT (OUTPATIENT)
Dept: PHYSICAL THERAPY | Facility: MEDICAL CENTER | Age: 85
End: 2020-02-07
Payer: MEDICARE

## 2020-02-07 DIAGNOSIS — M17.0 PRIMARY OSTEOARTHRITIS OF BOTH KNEES: Primary | ICD-10-CM

## 2020-02-07 PROCEDURE — 97140 MANUAL THERAPY 1/> REGIONS: CPT | Performed by: PHYSICAL MEDICINE & REHABILITATION

## 2020-02-07 PROCEDURE — 97110 THERAPEUTIC EXERCISES: CPT | Performed by: PHYSICAL MEDICINE & REHABILITATION

## 2020-02-07 PROCEDURE — 97112 NEUROMUSCULAR REEDUCATION: CPT | Performed by: PHYSICAL MEDICINE & REHABILITATION

## 2020-02-07 NOTE — PROGRESS NOTES
Daily Note     Today's date: 2020  Patient name: Niru Dodd  : 1935  MRN: [id number]  Referring provider: Fernie Linares DO  Dx:   Encounter Diagnosis     ICD-10-CM    1  Primary osteoarthritis of both knees M17 0                   Subjective: Jerel Landis reported "My right knee is really really bothering me today "      Objective: See treatment diary below      Assessment: Tolerated treatment well  Patient would benefit from continued PT  Due to Trinh's report of right knee pain prone knee flexion mobs were added during today's session  She reported notable relief following manual therapy  She demonstrated an increase in gait mechanics following manual therapy  Plan: Continue per plan of care        Precautions: none      Manual  2020 2020 2020  2020 1/10/2020 2020 2020 2020 2020   IASTM JR JR JR  JR JR JR JR JR JR   Prone knee flexion mobs   JR                                                     Exercise Diary  1/30 2/4 2/7  1/7 1/10 1/13 1/16 1/20 1/27   Bridges 25# 3x12 25# 3x12 25# 3x12  20# 3x10 25# 3x10  25# 3x10 25# 3x12 25# 3x12   Standing SLR 3x# 3x10 3# 3x10 HEP  2# 3x10 3# 3x10       Standing Hip Ext             Side Lying Hip Abd     2# 3x10 3# 3x10  3# 3x10 2# 3x12    Prone Quad Stretch 4x30" 4x30" 4x30"  4x30" 4x30"  4x30" 4x30" 4x30"   Leg Press 40# 3x10 40# 3x10 NP  30# 3x10 30# 3x10  40# 3x10 40# 3x10 40# 3x10   Hooklying Hip Abd        Grn 3x12     SLRs         2# 3x12 2# 3x12   Standing Calf Stretch 4x30" x2 4x30"x2 NP       4x30"x2   Clam Shells 3x10 3x10 3x10x2          Step Ups  3x10 NP          Step Downs  3x10 NP                                                                                                     HEP teaching and return demonstration 8'                Modalities              CP     10'  10'

## 2020-02-11 ENCOUNTER — OFFICE VISIT (OUTPATIENT)
Dept: PHYSICAL THERAPY | Facility: MEDICAL CENTER | Age: 85
End: 2020-02-11
Payer: MEDICARE

## 2020-02-11 DIAGNOSIS — M17.0 PRIMARY OSTEOARTHRITIS OF BOTH KNEES: Primary | ICD-10-CM

## 2020-02-11 PROCEDURE — 97110 THERAPEUTIC EXERCISES: CPT | Performed by: PHYSICAL MEDICINE & REHABILITATION

## 2020-02-11 PROCEDURE — 97140 MANUAL THERAPY 1/> REGIONS: CPT | Performed by: PHYSICAL MEDICINE & REHABILITATION

## 2020-02-11 NOTE — PROGRESS NOTES
Daily Note     Today's date: 2020  Patient name: Roberto White  : 1935  MRN: [id number]  Referring provider: Jaun Arevalo DO  Dx:   Encounter Diagnosis     ICD-10-CM    1  Primary osteoarthritis of both knees M17 0                   Subjective: Peña Blanco reported "My right knee has been very painful the last few days "      Objective: See treatment diary below      Assessment: Tolerated treatment well  Patient would benefit from continued PT  Peña Blanco was able to add prostretch gastroc stretching to her therapy program which shows progress towards her goals  She reported relief following calf stretching and manual therapy  Prostretch stretching was also added to stretch her calf as she continues to report right knee pain after the last two sessions  Plan: Continue per plan of care        Precautions: none      Manual  2020   IAS JR JR Erasmo [place]  JR   Prone knee flexion swapna Faria                                                    Exercise Diary     Bridges 25# 3x12 25# 3x12 25# 3x12 25# 3x12    25# 3x10 25# 3x12 25# 3x12   Standing SLR 3x# 3x10 3# 3x10 HEP          Standing Hip Ext             Side Lying Hip Abd        3# 3x10 2# 3x12    Prone Quad Stretch 4x30" 4x30" 4x30" 4x30"    4x30" 4x30" 4x30"   Leg Press 40# 3x10 40# 3x10 NP     40# 3x10 40# 3x10 40# 3x10   Hooklying Hip Abd        Grn 3x12     SLRs         2# 3x12 2# 3x12   Standing Calf Stretch 4x30" x2 4x30"x2 NP 4x30" x2      4x30"x2   Clam Shells 3x10 3x10 3x10x2 3x10x2         Step Ups  3x10 NP NP         Step Downs  3x10 NP NP                                                                                                    HEP teaching and return demonstration 8'                Modalities              CP     10'  10'

## 2020-02-13 ENCOUNTER — OFFICE VISIT (OUTPATIENT)
Dept: PHYSICAL THERAPY | Facility: MEDICAL CENTER | Age: 85
End: 2020-02-13
Payer: MEDICARE

## 2020-02-13 DIAGNOSIS — M17.0 PRIMARY OSTEOARTHRITIS OF BOTH KNEES: Primary | ICD-10-CM

## 2020-02-13 PROCEDURE — 97110 THERAPEUTIC EXERCISES: CPT | Performed by: PHYSICAL MEDICINE & REHABILITATION

## 2020-02-13 PROCEDURE — 97140 MANUAL THERAPY 1/> REGIONS: CPT | Performed by: PHYSICAL MEDICINE & REHABILITATION

## 2020-02-13 NOTE — PROGRESS NOTES
Daily Note     Today's date: 2020  Patient name: Elsa Garcia  : 1935  MRN: [id number]  Referring provider: Ofelia Fragoso DO  Dx:   Encounter Diagnosis     ICD-10-CM    1  Primary osteoarthritis of both knees M17 0                   Subjective: Landy Rodriguez reported "I am doing better, but I still have some pain behind the knee "      Objective: See treatment diary below      Assessment: Tolerated treatment well  Patient would benefit from continued PT  Landy Rodriguez was able to add prostretch stretching to her therapy program which shows progress towards her goals  Following the addition of the stretch she reported "I like that stretch, it is working pretty well "      Plan: Continue per plan of care        Precautions: none      Manual  2020   IASTM JR JR JR JR JR  JR JR JR JR   Prone knee flexion Moisés Wallace                                                   Exercise Diary     Bridges 25# 3x12 25# 3x12 25# 3x12 25# 3x12 25# 3x12   25# 3x10 25# 3x12 25# 3x12   Standing SLR 3x# 3x10 3# 3x10 HEP          Standing Hip Ext             Side Lying Hip Abd        3# 3x10 2# 3x12    Prone Quad Stretch 4x30" 4x30" 4x30" 4x30" 4x30"   4x30" 4x30" 4x30"   Leg Press 40# 3x10 40# 3x10 NP     40# 3x10 40# 3x10 40# 3x10   Hooklying Hip Abd        Grn 3x12     SLRs         2# 3x12 2# 3x12   Standing Calf Stretch 4x30" x2 4x30"x2 NP 4x30" x2 4x30"x2     4x30"x2   Clam Shells 3x10 3x10 3x10x2 3x10x2 3x10x2        Step Ups  3x10 NP NP         Step Downs  3x10 NP NP                                                                                                    HEP teaching and return demonstration 8'                Modalities              CP     10'  10'

## 2020-02-18 ENCOUNTER — OFFICE VISIT (OUTPATIENT)
Dept: PHYSICAL THERAPY | Facility: MEDICAL CENTER | Age: 85
End: 2020-02-18
Payer: MEDICARE

## 2020-02-18 DIAGNOSIS — M17.0 PRIMARY OSTEOARTHRITIS OF BOTH KNEES: Primary | ICD-10-CM

## 2020-02-18 PROCEDURE — 97140 MANUAL THERAPY 1/> REGIONS: CPT | Performed by: PHYSICAL MEDICINE & REHABILITATION

## 2020-02-18 PROCEDURE — 97110 THERAPEUTIC EXERCISES: CPT | Performed by: PHYSICAL MEDICINE & REHABILITATION

## 2020-02-18 NOTE — PROGRESS NOTES
PT Re-Evaluation     Today's date: 2020  Patient name: Taye Vega  : 1935  MRN: [id number]  Referring provider: Rai Carvalho DO  Dx:   Encounter Diagnosis     ICD-10-CM    1  Primary osteoarthritis of both knees M17 0                   Assessment  Assessment details: Patient is a 82 y o  female who reports to outpatient physical therapy with bilateral knee pain  Jacob Greenwood has improved in strength, ROM and pain  However, she continues to have deficits in pain  Skilled physical therapy continues to be warranted to help her make progress towards her goals  Prognosis is good given HEP compliance and attendance to physical therapy 2x for 4 weeks  Please contact me if you have any questions or recommendations  Thank you for the referral and the opportunity to share in Upton's care  Patient verbalized understanding of POC, HEP, and return demonstrated HEP  Impairments: abnormal coordination, abnormal gait, abnormal muscle firing, abnormal or restricted ROM, abnormal movement, activity intolerance, impaired balance, impaired physical strength, lacks appropriate home exercise program, pain with function and weight-bearing intolerance  Understanding of Dx/Px/POC: good   Prognosis: good    Goals  Impairment Goals  - Decrease pain by 50% in 4 weeks  - met  - Increase bilateral flexibility by 50% in 4 weeks  - met  - Increase bilateral lower extremity strength golbally to 4/5 in 4 weeks  - met  - Increase bilateral hip abductor and external rotator strength strength to 3/5  4 weeks   - met    Functional Goals  - Return to Prior Level of Function in 8 weeks - partially met  - Patient will be independent with HEP in 8 weeks - partially met    Plan  Patient would benefit from: skilled PT  Planned modality interventions: cryotherapy  Planned therapy interventions: joint mobilization, manual therapy, neuromuscular re-education, patient education, strengthening, stretching, therapeutic activities, therapeutic exercise, home exercise program, functional ROM exercises, Escobar taping and postural training  Frequency: 2-3x week  Treatment plan discussed with: patient        Subjective Evaluation    History of Present Illness  Mechanism of injury: Felicita King reported "I am making progress, some days it is great, some days not as great "  Pain  Current pain ratin  At best pain ratin  At worst pain ratin    Patient Goals  Patient goal: getting up and out of a chair,         Objective     Static Posture     Comments  Sitting Posture: Forward head, rounded shoulders,     MMTs:  Hip flex (L1-L2): R: 4+/5, L: 4+/5  Knee ext (L3-L4): R: 4+/5, L: 4+/5  Knee flex (S2-S3): R: 4/5, L: 4/5  Sidelying Hip Abd (L4-S1): R: 4-/5, L: 4/5    ROM:  Knee Ext (0): AROM: R: 2, L: 0: PROM: R: 2, L: 0  Knee Flex (0-130): AROM: R: 115, L: 118: PROM: R: 117, L: 118  SLR (0-90): AROM: R: 75, L: 75: PROM: R: 88, L: 88  DF (0-15): AROM: R: 6, L: 6:               Plan: Continue per plan of care        Precautions: none      Manual  2020   IASGENIA Gloria JR, JR   Prone knee flexion swapna Diaz                                                  Exercise Diary     Bridges 25# 3x12 25# 3x12 25# 3x12 25# 3x12 25# 3x12    25# 3x12 25# 3x12   Standing SLR 3x# 3x10 3# 3x10 HEP          Standing Hip Ext             Side Lying Hip Abd         2# 3x12    Prone Quad Stretch 4x30" 4x30" 4x30" 4x30" 4x30" 4x30"   4x30" 4x30"   Leg Press 40# 3x10 40# 3x10 NP      40# 3x10 40# 3x10   Hooklying Hip Abd             SLRs         2# 3x12 2# 3x12   Standing Calf Stretch 4x30" x2 4x30"x2 NP 4x30" x2 4x30"x2 4x30"x2    4x30"x2   Clam Shells 3x10 3x10 3x10x2 3x10x2 3x10x2        Step Ups  3x10 NP NP         Step Downs  3x10 NP NP         Heel raises      3x10       Prostretch      4x30"x2 HEP teaching and return demonstration 10'                Modalities              CP     10'  10'

## 2020-02-20 ENCOUNTER — OFFICE VISIT (OUTPATIENT)
Dept: PHYSICAL THERAPY | Facility: MEDICAL CENTER | Age: 85
End: 2020-02-20
Payer: MEDICARE

## 2020-02-20 DIAGNOSIS — M17.0 PRIMARY OSTEOARTHRITIS OF BOTH KNEES: Primary | ICD-10-CM

## 2020-02-20 PROCEDURE — 97140 MANUAL THERAPY 1/> REGIONS: CPT | Performed by: PHYSICAL MEDICINE & REHABILITATION

## 2020-02-20 PROCEDURE — 97112 NEUROMUSCULAR REEDUCATION: CPT | Performed by: PHYSICAL MEDICINE & REHABILITATION

## 2020-02-20 PROCEDURE — 97110 THERAPEUTIC EXERCISES: CPT | Performed by: PHYSICAL MEDICINE & REHABILITATION

## 2020-02-20 NOTE — PROGRESS NOTES
Daily Note     Today's date: 2020  Patient name: Angie Burgess  : 1935  MRN: [id number]  Referring provider: Kyung Weinberg DO  Dx:   Encounter Diagnosis     ICD-10-CM    1  Primary osteoarthritis of both knees M17 0                   Subjective: Marcella Goodman reported "I am doing much better than I was yesterday "      Objective: See treatment diary below      Assessment: Tolerated treatment well  Patient would benefit from continued PT  Marcella Goodman was able to increase her resistance for her exercises as seen below  This shows progress towards her goals  She denied an increase in pain with the increase in resistance  Plan: Continue per plan of care         Precautions: none      Manual  2020   IASTM JR [place] Child  JR JR   Prone knee flexion swapna Cuello Said                                                 Exercise Diary     Bridges 25# 3x12 25# 3x12 25# 3x12 25# 3x12 25# 3x12  30# 3x12  25# 3x12 25# 3x12   Standing SLR 3x# 3x10 3# 3x10 HEP          Standing Hip Ext             Side Lying Hip Abd         2# 3x12    Prone Quad Stretch 4x30" 4x30" 4x30" 4x30" 4x30" 4x30" 4x30"  4x30" 4x30"   Leg Press 40# 3x10 40# 3x10 NP    50# 3x10  40# 3x10 40# 3x10   Hooklying Hip Abd             SLRs         2# 3x12 2# 3x12   Standing Calf Stretch 4x30" x2 4x30"x2 NP 4x30" x2 4x30"x2 4x30"x2 4x30"   4x30"x2   Clam Shells 3x10 3x10 3x10x2 3x10x2 3x10x2  3x10x2      Step Ups  3x10 NP NP         Step Downs  3x10 NP NP         Heel raises      3x10 3x10      Prostretch      4x30"x2 4x30"x2                                                                       HEP teaching and return demonstration 10'                Modalities              CP     10'  10'

## 2020-02-21 ENCOUNTER — CLINICAL SUPPORT (OUTPATIENT)
Dept: NUTRITION | Facility: HOSPITAL | Age: 85
End: 2020-02-21
Payer: MEDICARE

## 2020-02-21 VITALS — WEIGHT: 157.2 LBS | BODY MASS INDEX: 28.75 KG/M2

## 2020-02-21 DIAGNOSIS — E11.9 TYPE 2 DIABETES MELLITUS WITHOUT COMPLICATION, WITHOUT LONG-TERM CURRENT USE OF INSULIN (HCC): ICD-10-CM

## 2020-02-21 PROCEDURE — 97803 MED NUTRITION INDIV SUBSEQ: CPT

## 2020-02-21 NOTE — PROGRESS NOTES
Follow-Up Nutrition Assessment Form    Patient Name: Russell Gutierrez    YOB: 1935    Sex: Female      Follow Up Date: 2/21/2020  Start Time: 1120 Stop Time: 56 Total Minutes: 27     Data:  Present at session: self   Parent/Patient Concerns: DM   Medical Dx/Reason for Referral: DM   Past Medical History:   Diagnosis Date    Administration of long-term prophylactic antibiotics 04/29/2014    Need for Prophylactic Antibiotics       Current Outpatient Medications   Medication Sig Dispense Refill    aspirin 81 MG tablet Take 1 tablet by mouth daily      BENICAR HCT 40-12 5 MG per tablet TAKE 1 TABLET BY MOUTH EVERY DAY 90 tablet 0    bimatoprost (LUMIGAN) 0 01 % ophthalmic drops Apply 1 drop to eye Daily      Brinzolamide-Brimonidine (SIMBRINZA) 1-0 2 % SUSP Apply to eye      CELEBREX 200 MG capsule TAKE ONE CAPSULE BY MOUTH DAILY WITH A MEAL 90 capsule 1    co-enzyme Q-10 30 MG capsule Take 30 mg by mouth daily      Coenzyme Q10 (COQ10) 100 MG CAPS Take 1 capsule by mouth daily      Cranberry 450 MG TABS Take by mouth      Loratadine (CLARITIN) 10 MG CAPS Take by mouth      LOTREL 5-20 MG per capsule TAKE 1 CAPSULE BY MOUTH EVERY DAY 90 capsule 1    Multiple Vitamins-Minerals (CENTRUM SILVER ULTRA WOMENS PO) Take 1 tablet by mouth daily      Olopatadine HCl (PAZEO) 0 7 % SOLN Apply to eye      Omega-3 Fatty Acids (FISH OIL) 1,000 mg Take 1 capsule by mouth daily      polyethylene glycol-propylene glycol (SYSTANE ULTRA) 0 4-0 3 % Apply to eye      PREMARIN 0 3 MG tablet TAKE 1 TABLET BY MOUTH ONCE DAILY 5 DAYS PER WEEK 34 tablet 2    PREMARIN 0 3 MG tablet TAKE 1 TABLET (0 3 MG TOTAL) BY MOUTH DAILY TAKE DAILY FOR 21 DAYS THEN DO NOT TAKE FOR 7 DAYS   64 tablet 0    Probiotic Product (ALIGN PO) Take by mouth daily      SYNTHROID 25 MCG tablet TAKE 1 TABLET BY MOUTH EVERY DAY 90 tablet 1    TOPROL XL 50 MG 24 hr tablet Take 1 tablet (50 mg total) by mouth daily 90 tablet 1    VYTORIN 10-20 MG per tablet TAKE 1 TABLET BY MOUTH EVERY DAY 90 tablet 1     No current facility-administered medications for this visit  Additional Meds/Supplements: n/a   Barriers to Learning: None   Labs: n/a   Height: Ht Readings from Last 3 Encounters:   12/18/19 5' 2" (1 575 m)   12/04/19 5' 2" (1 575 m)   07/23/19 5' 1 5" (1 562 m)      Weight: Wt Readings from Last 10 Encounters:   02/21/20 71 3 kg (157 lb 3 2 oz)   12/18/19 70 3 kg (155 lb)   12/12/19 70 6 kg (155 lb 9 6 oz)   12/04/19 70 3 kg (155 lb)   10/25/19 70 2 kg (154 lb 12 8 oz)   09/12/19 69 6 kg (153 lb 6 4 oz)   07/23/19 69 3 kg (152 lb 12 8 oz)   07/23/19 68 9 kg (152 lb)   06/05/19 69 kg (152 lb 3 2 oz)   03/26/19 70 3 kg (155 lb)     Estimated body mass index is 28 75 kg/m² as calculated from the following:    Height as of 12/18/19: 5' 2" (1 575 m)  Weight as of this encounter: 71 3 kg (157 lb 3 2 oz)  Wt  Change Since Last Visit: [x]Yes     []No  Amount: 2# gain      Energy Needs: No calculations performed for this visit   Pain Screen: Are you having pain now? No      Goals Achieved: eating 3 meals a day snacking as needed, stays very active, drinks water all day, gets adequate sleep     New Goals:   1  continue same meal and activity plan   2    3        Initial PES:    Altered nutrition related labs r/t Dm AEB elevated BG  New PES: No Change      New Problem List:  1  Had flu over the holidays   2    3        Assessment:  Slight wt gain noted, but just got over flu during the holiday really felt weak, and is now feeling much better  Energy is good at present  Does not check Bg at home  Drinking mainly water during and coffee first thing in the morning w/skim milk  (2 mugs)  Good intake of fruits and vegetables        Medical Nutrition Therapy Intervention:  [x]Individualized Meal Plan- is keeping a food diary prior to visits; spinach ravioli grapes blueberries rye bread; 2 egg 2 raines oven roasted potatoes and rye toast-cream cheese and raspberries preserves []Understanding Lab Values   []Basic Pathophysiology of Disease []Food/Medication Interactions   [x]Food Diary was eating a lot of chicken noodle soup and rye toast over holidays [x]Exercise-working with PT for legs    [x]Lifestyle/Behavior Modification Techniques- sleeping well gets about 7-8 hours a night []Medication, Mechanism of Action   []Label Reading [x]Self Blood Glucose Monitoring- A1C last one was 5 9 12/4/19   [x]Weight/BMI Goals- would like to lose a little; preferably 140# []Other -    Other Notes:        Comprehension: []Excellent  [x]Very Good  []Good  []Fair   []Poor    Receptivity: []Excellent  [x]Very Good  []Good  []Fair   []Poor    Expected Compliance: []Excellent  [x]Very Good  []Good  []Fair   []Poor      Labs:  CMP  Lab Results   Component Value Date     06/04/2015    K 4 3 03/21/2019     03/21/2019    CO2 26 03/21/2019    ANIONGAP 9 06/04/2015    BUN 27 (H) 03/21/2019    CREATININE 1 03 03/21/2019    GLUCOSE 131 06/04/2015    GLUF 135 (H) 03/21/2019    CALCIUM 9 1 03/21/2019    AST 29 03/21/2019    ALT 34 03/21/2019    ALKPHOS 60 03/21/2019    PROT 7 3 06/04/2015    BILITOT 0 48 06/04/2015    EGFR 51 03/21/2019       BMP  Lab Results   Component Value Date    GLUCOSE 131 06/04/2015    CALCIUM 9 1 03/21/2019     06/04/2015    K 4 3 03/21/2019    CO2 26 03/21/2019     03/21/2019    BUN 27 (H) 03/21/2019    CREATININE 1 03 03/21/2019       Lipids  Lab Results   Component Value Date    CHOL 142 12/11/2014     Lab Results   Component Value Date    HDL 46 03/21/2019    HDL 45 03/13/2018    HDL 44 09/19/2016     Lab Results   Component Value Date    LDLCALC 38 03/21/2019    LDLCALC 38 03/13/2018    LDLCALC 45 09/19/2016     Lab Results   Component Value Date    TRIG 156 (H) 03/21/2019    TRIG 167 (H) 03/13/2018    TRIG 150 09/19/2016     No results found for: CHOLHDL    Hemoglobin A1C  Lab Results   Component Value Date    HGBA1C 5 9 12/04/2019       Fasting Glucose  Lab Results   Component Value Date    GLUF 135 (H) 03/21/2019       Insulin     Thyroid  No results found for: TSH, A2KAFKH, R5CBHOW, THYROIDAB    Hepatic Function Panel  Lab Results   Component Value Date    ALT 34 03/21/2019    AST 29 03/21/2019    ALKPHOS 60 03/21/2019    BILITOT 0 48 06/04/2015       Celiac Disease Antibody Panel  No results found for: ENDOMYSIAL IGA, GLIADIN IGA, GLIADIN IGG, IGA, TISSUE TRANSGLUT AB, TTG IGA   Iron  No results found for: IRON, TIBC, FERRITIN    Vitamins  No results found for: VITAMIN B2   No results found for: NICOTINAMIDE, NICOTINIC ACID   No results found for: VITAMINB6  Lab Results   Component Value Date    SVTRQHFU60 350 03/13/2018     No results found for: VITB5  No results found for: W5MMAMRE  No results found for: THYROGLB  No results found for: VITAMIN K   No results found for: 25-HYDROXY VIT D   No components found for: VITAMINE     No follow-ups on file      [id number] [facility] [address]  [address] [address]  [facility] [address]

## 2020-02-25 ENCOUNTER — OFFICE VISIT (OUTPATIENT)
Dept: PHYSICAL THERAPY | Facility: MEDICAL CENTER | Age: 85
End: 2020-02-25
Payer: MEDICARE

## 2020-02-25 DIAGNOSIS — M17.0 PRIMARY OSTEOARTHRITIS OF BOTH KNEES: Primary | ICD-10-CM

## 2020-02-25 PROCEDURE — 97110 THERAPEUTIC EXERCISES: CPT

## 2020-02-25 PROCEDURE — 97140 MANUAL THERAPY 1/> REGIONS: CPT

## 2020-02-25 NOTE — PROGRESS NOTES
Daily Note     Today's date: 2020  Patient name: Dereje Villar  : 1935  MRN: [id number]  Referring provider: Felton Wiggins DO  Dx:   Encounter Diagnosis     ICD-10-CM    1  Primary osteoarthritis of both knees M17 0                   Subjective: Pt reports that her L ankle is causing her some discomfort, as well as increased edema surrounding her ankle joint  Pt states she is still challenged w/going up and down the steps due to bilat knee discomfort, but that her knees are significantly better since beginning PT  Objective: See treatment diary below      Assessment: Tolerated treatment well  Patient demonstrated fatigue post treatment, exhibited good technique with therapeutic exercises and would benefit from continued PT  Pt is responding well to manual tx and current treatment plan  Plan: Continue per plan of care        Precautions: none      Manual  2020   IASTM JR JR JR JR JR JR JR KO  JR   Prone knee flexion mobs   Beverely Figures np             L ankle [facility]                                   Exercise Diary     Bridges 25# 3x12 25# 3x12 25# 3x12 25# 3x12 25# 3x12  30# 3x12 30#  3x12  25# 3x12   Standing SLR 3x# 3x10 3# 3x10 HEP          Standing Hip Ext             Side Lying Hip Abd             Prone Quad Stretch 4x30" 4x30" 4x30" 4x30" 4x30" 4x30" 4x30" 4x30"  4x30"   Leg Press 40# 3x10 40# 3x10 NP    50# 3x10 np  40# 3x10   Hooklying Hip Abd             SLRs          2# 3x12   Standing Calf Stretch 4x30" x2 4x30"x2 NP 4x30" x2 4x30"x2 4x30"x2 4x30" 4x30"  4x30"x2   Clam Shells 3x10 3x10 3x10x2 3x10x2 3x10x2  3x10x2 3x10  3x10x2   Step Ups  3x10 NP NP         Step Downs  3x10 NP NP         Heel raises      3x10 3x10 3x10     Prostretch      4x30"x2 4x30"x2 4x30"                                                                      HEP teaching and return demonstration 8'                Modalities              CP     10'  10' np

## 2020-02-27 ENCOUNTER — OFFICE VISIT (OUTPATIENT)
Dept: PHYSICAL THERAPY | Facility: MEDICAL CENTER | Age: 85
End: 2020-02-27
Payer: MEDICARE

## 2020-02-27 DIAGNOSIS — M17.0 PRIMARY OSTEOARTHRITIS OF BOTH KNEES: Primary | ICD-10-CM

## 2020-02-27 PROCEDURE — 97140 MANUAL THERAPY 1/> REGIONS: CPT | Performed by: PHYSICAL MEDICINE & REHABILITATION

## 2020-02-27 PROCEDURE — 97112 NEUROMUSCULAR REEDUCATION: CPT | Performed by: PHYSICAL MEDICINE & REHABILITATION

## 2020-02-27 PROCEDURE — 97110 THERAPEUTIC EXERCISES: CPT | Performed by: PHYSICAL MEDICINE & REHABILITATION

## 2020-02-27 NOTE — PROGRESS NOTES
Daily Note     Today's date: 2020  Patient name: Angie Burgess  : 1935  MRN: [id number]  Referring provider: Kyung Weinberg DO  Dx:   Encounter Diagnosis     ICD-10-CM    1  Primary osteoarthritis of both knees M17 0                 Subjective: Marcella Goodman reported "I am a little sore today, because I was cleaning for 4 hours yesterday "      Objective: See treatment diary below      Assessment: Tolerated treatment well  Patient would benefit from continued PT  Marcella Goodman was able to progress her strengthening to include sit to stands which shows progress towards her goals  She currently is reporting that is one of her most notable deficits at this time  Plan: Continue per plan of care        Precautions: none      Manual  2020    IASTM JR JR Christi Ariton JR JR KO JR    Prone knee flexion mobs   RX MP QD PE VH np [place] ankle [facility]                                   Exercise Diary      Bridges 25# 3x12 25# 3x12 25# 3x12 25# 3x12 25# 3x12  30# 3x12 30#  3x12 15# 3x12    Standing SLR 3x# 3x10 3# 3x10 HEP          Standing Hip Ext             Side Lying Hip Abd             Prone Quad Stretch 4x30" 4x30" 4x30" 4x30" 4x30" 4x30" 4x30" 4x30" 4x30"    Leg Press 40# 3x10 40# 3x10 NP    50# 3x10 np     Hooklying Hip Abd             SLRs             Standing Calf Stretch 4x30" x2 4x30"x2 NP 4x30" x2 4x30"x2 4x30"x2 4x30" 4x30" 4x30"    Clam Shells 3x10 3x10 3x10x2 3x10x2 3x10x2  3x10x2 3x10 3x10    Step Ups  3x10 NP NP         Step Downs  3x10 NP NP         Heel raises      3x10 3x10 3x10     Prostretch      4x30"x2 4x30"x2 4x30"x2 4x30"x2    Sit to Stands         3x10                                                        HEP teaching and return demonstration 10'                Modalities              CP     10'  10' np

## 2020-03-03 ENCOUNTER — OFFICE VISIT (OUTPATIENT)
Dept: PHYSICAL THERAPY | Facility: MEDICAL CENTER | Age: 85
End: 2020-03-03
Payer: MEDICARE

## 2020-03-03 DIAGNOSIS — M17.0 PRIMARY OSTEOARTHRITIS OF BOTH KNEES: Primary | ICD-10-CM

## 2020-03-03 PROCEDURE — 97110 THERAPEUTIC EXERCISES: CPT | Performed by: PHYSICAL MEDICINE & REHABILITATION

## 2020-03-03 PROCEDURE — 97140 MANUAL THERAPY 1/> REGIONS: CPT | Performed by: PHYSICAL MEDICINE & REHABILITATION

## 2020-03-03 NOTE — PROGRESS NOTES
Daily Note     Today's date: 3/3/2020  Patient name: Fredy Landon  : 1935  MRN: [id number]  Referring provider: Richard Robins DO  Dx:   Encounter Diagnosis     ICD-10-CM    1  Primary osteoarthritis of both knees M17 0                   Subjective: Felicita King reported "I am doing better, I think I am at the point where I won't need surgery "      Objective: See treatment diary below      Assessment: Tolerated treatment well  Patient would benefit from continued PT  Felicita King has demonstrated an increase in independence with her HEP which shows progress towards her goals  Expected D/C within the next three visits  Felicita King was able to start to increase her resistance for bridges again which shows progress towards her goals  Plan: Continue per plan of care        Precautions: none      Manual  2020 2020 2020 2020 2020 2020 2020 2/25 2020 3/3/2020   IASTM JR JR JR JR JR JR JR KO JR    Prone knee flexion mobs   [facility] np [place] ankle [facility]                                   Exercise Diary  1/30 2/4 2/7 2/11 2/13 2/18 2/20 2/25 2/27 3/3   Bridges 25# 3x12 25# 3x12 25# 3x12 25# 3x12 25# 3x12  30# 3x12 30#  3x12 15# 3x12 20# 3x15   Standing SLR 3x# 3x10 3# 3x10 HEP          Standing Hip Ext             Side Lying Hip Abd             Prone Quad Stretch 4x30" 4x30" 4x30" 4x30" 4x30" 4x30" 4x30" 4x30" 4x30" 4x30"   Leg Press 40# 3x10 40# 3x10 NP    50# 3x10 np     Hooklying Hip Abd             SLRs             Standing Calf Stretch 4x30" x2 4x30"x2 NP 4x30" x2 4x30"x2 4x30"x2 4x30" 4x30" 4x30" 4x30"   Clam Shells 3x10 3x10 3x10x2 3x10x2 3x10x2  3x10x2 3x10 3x10 3x10   Step Ups  3x10 NP NP         Step Downs  3x10 NP NP         Heel raises      3x10 3x10 3x10     Prostretch      4x30"x2 4x30"x2 4x30"x2 4x30"x2 4x30"x2   Sit to Stands         3x10 3x10                                                       HEP teaching and return demonstration 8' Modalities              CP     10'  10' np

## 2020-03-04 ENCOUNTER — APPOINTMENT (OUTPATIENT)
Dept: LAB | Facility: MEDICAL CENTER | Age: 85
End: 2020-03-04
Payer: MEDICARE

## 2020-03-04 DIAGNOSIS — E11.9 TYPE 2 DIABETES MELLITUS WITHOUT COMPLICATION, WITHOUT LONG-TERM CURRENT USE OF INSULIN (HCC): ICD-10-CM

## 2020-03-04 DIAGNOSIS — I10 ESSENTIAL HYPERTENSION: ICD-10-CM

## 2020-03-04 DIAGNOSIS — E03.8 HYPOTHYROIDISM DUE TO HASHIMOTO'S THYROIDITIS: ICD-10-CM

## 2020-03-04 DIAGNOSIS — E78.2 MIXED HYPERLIPIDEMIA: ICD-10-CM

## 2020-03-04 DIAGNOSIS — E06.3 HYPOTHYROIDISM DUE TO HASHIMOTO'S THYROIDITIS: ICD-10-CM

## 2020-03-04 DIAGNOSIS — E53.8 VITAMIN B12 DEFICIENCY: ICD-10-CM

## 2020-03-04 DIAGNOSIS — M17.0 PRIMARY OSTEOARTHRITIS OF BOTH KNEES: ICD-10-CM

## 2020-03-04 LAB
ALBUMIN SERPL BCP-MCNC: 4 G/DL (ref 3.5–5)
ALP SERPL-CCNC: 72 U/L (ref 46–116)
ALT SERPL W P-5'-P-CCNC: 35 U/L (ref 12–78)
ANION GAP SERPL CALCULATED.3IONS-SCNC: 7 MMOL/L (ref 4–13)
AST SERPL W P-5'-P-CCNC: 29 U/L (ref 5–45)
BASOPHILS # BLD AUTO: 0.04 THOUSANDS/ΜL (ref 0–0.1)
BASOPHILS NFR BLD AUTO: 1 % (ref 0–1)
BILIRUB SERPL-MCNC: 0.48 MG/DL (ref 0.2–1)
BUN SERPL-MCNC: 31 MG/DL (ref 5–25)
CALCIUM SERPL-MCNC: 9.2 MG/DL (ref 8.3–10.1)
CHLORIDE SERPL-SCNC: 105 MMOL/L (ref 100–108)
CHOLEST SERPL-MCNC: 123 MG/DL (ref 50–200)
CO2 SERPL-SCNC: 26 MMOL/L (ref 21–32)
CREAT SERPL-MCNC: 0.92 MG/DL (ref 0.6–1.3)
CREAT UR-MCNC: 116 MG/DL
EOSINOPHIL # BLD AUTO: 0.18 THOUSAND/ΜL (ref 0–0.61)
EOSINOPHIL NFR BLD AUTO: 2 % (ref 0–6)
ERYTHROCYTE [DISTWIDTH] IN BLOOD BY AUTOMATED COUNT: 12.7 % (ref 11.6–15.1)
EST. AVERAGE GLUCOSE BLD GHB EST-MCNC: 140 MG/DL
GFR SERPL CREATININE-BSD FRML MDRD: 58 ML/MIN/1.73SQ M
GLUCOSE P FAST SERPL-MCNC: 128 MG/DL (ref 65–99)
HBA1C MFR BLD: 6.5 %
HCT VFR BLD AUTO: 40.3 % (ref 34.8–46.1)
HDLC SERPL-MCNC: 50 MG/DL
HGB BLD-MCNC: 13 G/DL (ref 11.5–15.4)
IMM GRANULOCYTES # BLD AUTO: 0.04 THOUSAND/UL (ref 0–0.2)
IMM GRANULOCYTES NFR BLD AUTO: 1 % (ref 0–2)
LDLC SERPL CALC-MCNC: 39 MG/DL (ref 0–100)
LYMPHOCYTES # BLD AUTO: 1.77 THOUSANDS/ΜL (ref 0.6–4.47)
LYMPHOCYTES NFR BLD AUTO: 22 % (ref 14–44)
MCH RBC QN AUTO: 30.4 PG (ref 26.8–34.3)
MCHC RBC AUTO-ENTMCNC: 32.3 G/DL (ref 31.4–37.4)
MCV RBC AUTO: 94 FL (ref 82–98)
MICROALBUMIN UR-MCNC: 7.1 MG/L (ref 0–20)
MICROALBUMIN/CREAT 24H UR: 6 MG/G CREATININE (ref 0–30)
MONOCYTES # BLD AUTO: 0.86 THOUSAND/ΜL (ref 0.17–1.22)
MONOCYTES NFR BLD AUTO: 11 % (ref 4–12)
NEUTROPHILS # BLD AUTO: 5.07 THOUSANDS/ΜL (ref 1.85–7.62)
NEUTS SEG NFR BLD AUTO: 63 % (ref 43–75)
NONHDLC SERPL-MCNC: 73 MG/DL
NRBC BLD AUTO-RTO: 0 /100 WBCS
PLATELET # BLD AUTO: 220 THOUSANDS/UL (ref 149–390)
PMV BLD AUTO: 12.6 FL (ref 8.9–12.7)
POTASSIUM SERPL-SCNC: 4.7 MMOL/L (ref 3.5–5.3)
PROT SERPL-MCNC: 7.3 G/DL (ref 6.4–8.2)
RBC # BLD AUTO: 4.27 MILLION/UL (ref 3.81–5.12)
SODIUM SERPL-SCNC: 138 MMOL/L (ref 136–145)
TRIGL SERPL-MCNC: 170 MG/DL
TSH SERPL DL<=0.05 MIU/L-ACNC: 3.21 UIU/ML (ref 0.36–3.74)
WBC # BLD AUTO: 7.96 THOUSAND/UL (ref 4.31–10.16)

## 2020-03-04 PROCEDURE — 82043 UR ALBUMIN QUANTITATIVE: CPT | Performed by: FAMILY MEDICINE

## 2020-03-04 PROCEDURE — 80053 COMPREHEN METABOLIC PANEL: CPT

## 2020-03-04 PROCEDURE — 84443 ASSAY THYROID STIM HORMONE: CPT

## 2020-03-04 PROCEDURE — 83036 HEMOGLOBIN GLYCOSYLATED A1C: CPT

## 2020-03-04 PROCEDURE — 80061 LIPID PANEL: CPT

## 2020-03-04 PROCEDURE — 85025 COMPLETE CBC W/AUTO DIFF WBC: CPT

## 2020-03-04 PROCEDURE — 82570 ASSAY OF URINE CREATININE: CPT | Performed by: FAMILY MEDICINE

## 2020-03-04 PROCEDURE — 36415 COLL VENOUS BLD VENIPUNCTURE: CPT

## 2020-03-05 ENCOUNTER — OFFICE VISIT (OUTPATIENT)
Dept: PHYSICAL THERAPY | Facility: MEDICAL CENTER | Age: 85
End: 2020-03-05
Payer: MEDICARE

## 2020-03-05 DIAGNOSIS — M17.0 PRIMARY OSTEOARTHRITIS OF BOTH KNEES: Primary | ICD-10-CM

## 2020-03-05 PROCEDURE — 97110 THERAPEUTIC EXERCISES: CPT | Performed by: PHYSICAL MEDICINE & REHABILITATION

## 2020-03-05 PROCEDURE — 97112 NEUROMUSCULAR REEDUCATION: CPT | Performed by: PHYSICAL MEDICINE & REHABILITATION

## 2020-03-05 PROCEDURE — 97140 MANUAL THERAPY 1/> REGIONS: CPT | Performed by: PHYSICAL MEDICINE & REHABILITATION

## 2020-03-05 NOTE — PROGRESS NOTES
Daily Note     Today's date: 3/5/2020  Patient name: Dereje Villar  : 1935  MRN: [id number]  Referring provider: Felton Wiggins DO  Dx:   Encounter Diagnosis     ICD-10-CM    1  Primary osteoarthritis of both knees M17 0                   Subjective: Adelaida Goodman reported "I am doing well but I did too much yesterday because today I am feeling it "    Objective: See treatment diary below      Assessment: Tolerated treatment well  Patient would benefit from continued PT  Adelaida Goodman reported she was feeling pulling and stretching in her calves during prone quad stretching  Due to this her calf stretching was performed first  Following calf stretch she reported proper quadriceps stretch during prone quad stretching  Plan: Continue per plan of care        Precautions: none      Manual  3/5/2020  2020 2020 2020 2020 2020 2/25 2020 3/3/2020   IASTM Jair QUINN JR    Prone knee flexion mobs [facility]                                   Exercise Diary  3/5  2/7 2/11 2/13 2/18 2/20 2/25 2/27 3/3   Bridges   25# 3x12 25# 3x12 25# 3x12  30# 3x12 30#  3x12 15# 3x12 20# 3x15   Standing SLR   HEP          Standing Hip Ext             Side Lying Hip Abd             Prone Quad Stretch 4x30"  4x30" 4x30" 4x30" 4x30" 4x30" 4x30" 4x30" 4x30"   Leg Press   NP    50# 3x10 np     Hooklying Hip Abd             SLRs 3x10x2            Standing Calf Stretch 4x30"  NP 4x30" x2 4x30"x2 4x30"x2 4x30" 4x30" 4x30" 4x30"   Clam Shells 3x10  3x10x2 3x10x2 3x10x2  3x10x2 3x10 3x10 3x10   Step Ups   NP NP         Step Downs   NP NP         Heel raises 3x10     3x10 3x10 3x10     Prostretch 4x30"x2     4x30"x2 4x30"x2 4x30"x2 4x30"x2 4x30"x2   Sit to Stands         3x10 3x10                                                       HEP teaching and return demonstration 10'                Modalities              CP     10'  10' np

## 2020-03-09 DIAGNOSIS — R23.2 HOT FLASHES: ICD-10-CM

## 2020-03-09 RX ORDER — CONJUGATED ESTROGENS 0.3 MG/1
TABLET, FILM COATED ORAL
Qty: 64 TABLET | Refills: 0 | Status: SHIPPED | OUTPATIENT
Start: 2020-03-09 | End: 2020-06-05

## 2020-03-10 ENCOUNTER — OFFICE VISIT (OUTPATIENT)
Dept: PHYSICAL THERAPY | Facility: MEDICAL CENTER | Age: 85
End: 2020-03-10
Payer: MEDICARE

## 2020-03-10 DIAGNOSIS — M17.0 PRIMARY OSTEOARTHRITIS OF BOTH KNEES: Primary | ICD-10-CM

## 2020-03-10 PROCEDURE — 97110 THERAPEUTIC EXERCISES: CPT | Performed by: PHYSICAL MEDICINE & REHABILITATION

## 2020-03-10 PROCEDURE — 97140 MANUAL THERAPY 1/> REGIONS: CPT | Performed by: PHYSICAL MEDICINE & REHABILITATION

## 2020-03-10 PROCEDURE — 97112 NEUROMUSCULAR REEDUCATION: CPT | Performed by: PHYSICAL MEDICINE & REHABILITATION

## 2020-03-10 NOTE — PROGRESS NOTES
Daily Note     Today's date: 3/10/2020  Patient name: Raya Sosa  : 1935  MRN: [id number]  Referring provider: Deedee Palomo DO  Dx:   Encounter Diagnosis     ICD-10-CM    1  Primary osteoarthritis of both knees M17 0                   Subjective: Lea Alex reported "I am doing okay, my knees are sore because I did a lot of walking at the mall yesterday, but I am able to do lots of walking now which is very helpful "      Objective: See treatment diary below      Assessment: Tolerated treatment well  Patient would benefit from continued PT  Lea Alex was able to add step ups to her therapy program  She also reported notable relief following taping  If she finds she receives relief over the next few days she will be taught how to self apply the tape  Plan: Continue per plan of care        Precautions: none      Manual  3/5/2020 3/10/2020  2020 2020 2020 2020 2/25 2020 3/3/2020   IASTM Frances Romano JR KO JR    Prone knee flexion mobs Frances [place] np [place]            L ankle [place]     KT tape  JR                            Exercise Diary  3/5 3/10  2/11 2/13 2/18 2/20 2/25 2/27 3/3   Bridges    25# 3x12 25# 3x12  30# 3x12 30#  3x12 15# 3x12 20# 3x15   Standing SLR             Standing Hip Ext             Side Lying Hip Abd             Prone Quad Stretch 4x30" 4x30"  4x30" 4x30" 4x30" 4x30" 4x30" 4x30" 4x30"   Leg Press       50# 3x10 np     Hooklying Hip Abd             SLRs 3x10x2 3x10x2           Standing Calf Stretch 4x30" 4x30"  4x30" x2 4x30"x2 4x30"x2 4x30" 4x30" 4x30" 4x30"   Clam Shells 3x10 3x12  3x10x2 3x10x2  3x10x2 3x10 3x10 3x10   Step Ups  3x10  NP         Step Downs    NP         Heel raises 3x10 3x15    3x10 3x10 3x10     Prostretch 4x30"x2 4x30"x2    4x30"x2 4x30"x2 4x30"x2 4x30"x2 4x30"x2   Sit to Stands         3x10 3x10                                                       HEP teaching and return demonstration 10'                Modalities              CP 10'  10' np

## 2020-03-12 ENCOUNTER — OFFICE VISIT (OUTPATIENT)
Dept: PHYSICAL THERAPY | Facility: MEDICAL CENTER | Age: 85
End: 2020-03-12
Payer: MEDICARE

## 2020-03-12 DIAGNOSIS — M17.0 PRIMARY OSTEOARTHRITIS OF BOTH KNEES: Primary | ICD-10-CM

## 2020-03-12 PROCEDURE — 97140 MANUAL THERAPY 1/> REGIONS: CPT | Performed by: PHYSICAL MEDICINE & REHABILITATION

## 2020-03-12 PROCEDURE — 97112 NEUROMUSCULAR REEDUCATION: CPT | Performed by: PHYSICAL MEDICINE & REHABILITATION

## 2020-03-12 PROCEDURE — 97110 THERAPEUTIC EXERCISES: CPT | Performed by: PHYSICAL MEDICINE & REHABILITATION

## 2020-03-12 NOTE — PROGRESS NOTES
Daily Note     Today's date: 3/12/2020  Patient name: Jyoti Lee  : 1935  MRN: [id number]  Referring provider: Ariel Noble DO  Dx:   Encounter Diagnosis     ICD-10-CM    1  Primary osteoarthritis of both knees M17 0                   Subjective: Lord Starkey reported "I am doing well, I think next session can be my last appointment "      Objective: See treatment diary below      Assessment: Tolerated treatment well  Patient would benefit from continued PT  Lord Starkey demonstrated an increase in independence with her exercises which shows progress towards her goals  She was able to demonstrate proper self taping techniques which also shows progress towards her goals  Plan: Continue per plan of care        Precautions: none      Manual  3/5/2020 3/10/2020 3/12/2020  2020 2020 2020 2/25 2020 3/3/2020   IASTM Ramón Grooms JR KO JR    Prone knee flexion mobs Ramón [place] np [place]            L ankle [place]     KT tape  Doncherie Gibbs                           Exercise Diary  3/5 3/10 3/12  2/13 2/18 2/20 2/25 2/27 3/3   Bridges     25# 3x12  30# 3x12 30#  3x12 15# 3x12 20# 3x15   Standing SLR             Standing Hip Ext             Side Lying Hip Abd             Prone Quad Stretch 4x30" 4x30" 4x30"  4x30" 4x30" 4x30" 4x30" 4x30" 4x30"   Leg Press       50# 3x10 np     Hooklying Hip Abd             SLRs 3x10x2 3x10x2 3x10x2          Standing Calf Stretch 4x30" 4x30" 4x30"  4x30"x2 4x30"x2 4x30" 4x30" 4x30" 4x30"   Clam Shells 3x10 3x12 3x12  3x10x2  3x10x2 3x10 3x10 3x10   Step Ups  3x10 3x10          Step Downs             Heel raises 3x10 3x15 3x15   3x10 3x10 3x10     Prostretch 4x30"x2 4x30"x2 4x30"x2   4x30"x2 4x30"x2 4x30"x2 4x30"x2 4x30"x2   Sit to Stands         3x10 3x10                                                       HEP teaching and return demonstration 10'                Modalities              CP     10'  10' np

## 2020-03-14 DIAGNOSIS — I10 ESSENTIAL HYPERTENSION: ICD-10-CM

## 2020-03-16 DIAGNOSIS — R79.89 ABNORMAL TSH: ICD-10-CM

## 2020-03-16 RX ORDER — METOPROLOL SUCCINATE 50 MG
TABLET, EXTENDED RELEASE 24 HR ORAL
Qty: 90 TABLET | Refills: 1 | Status: SHIPPED | OUTPATIENT
Start: 2020-03-16 | End: 2020-08-19

## 2020-03-16 RX ORDER — LEVOTHYROXINE SODIUM 25 MCG
TABLET ORAL
Qty: 90 TABLET | Refills: 1 | Status: SHIPPED | OUTPATIENT
Start: 2020-03-16 | End: 2020-08-19

## 2020-03-16 RX ORDER — OLMESARTAN/HYDROCHLOROTHIAZIDE 40-12.5 MG
TABLET ORAL
Qty: 90 TABLET | Refills: 0 | Status: SHIPPED | OUTPATIENT
Start: 2020-03-16 | End: 2020-04-27

## 2020-03-16 RX ORDER — AMLODIPINE BESYLATE/BENAZEPRIL 5 MG-20 MG
CAPSULE ORAL
Qty: 90 CAPSULE | Refills: 1 | Status: SHIPPED | OUTPATIENT
Start: 2020-03-16 | End: 2020-06-18

## 2020-03-17 ENCOUNTER — APPOINTMENT (OUTPATIENT)
Dept: PHYSICAL THERAPY | Facility: MEDICAL CENTER | Age: 85
End: 2020-03-17
Payer: MEDICARE

## 2020-03-31 NOTE — PROGRESS NOTES
PT Discharge    Today's date: 3/31/2020  Patient name: Rosalinda Nolen  : 1935  MRN: [id number]  Referring provider: Dom Holley DO  Dx:   Encounter Diagnosis     ICD-10-CM    1  Primary osteoarthritis of both knees M17 0      Patient's therapy goals have been met at this time       Start Time: 1130  Stop Time: 1230  Total time in clinic (min): 60 minutes      Flowsheet Rows      Most Recent Value   PT/OT G-Codes   Current Score  58   Projected Score  49

## 2020-04-27 ENCOUNTER — OFFICE VISIT (OUTPATIENT)
Dept: FAMILY MEDICINE CLINIC | Facility: CLINIC | Age: 85
End: 2020-04-27
Payer: MEDICARE

## 2020-04-27 VITALS
DIASTOLIC BLOOD PRESSURE: 66 MMHG | SYSTOLIC BLOOD PRESSURE: 128 MMHG | BODY MASS INDEX: 28.34 KG/M2 | HEIGHT: 62 IN | TEMPERATURE: 98.1 F | WEIGHT: 154 LBS

## 2020-04-27 DIAGNOSIS — E11.9 TYPE 2 DIABETES MELLITUS WITHOUT COMPLICATION, WITHOUT LONG-TERM CURRENT USE OF INSULIN (HCC): Primary | ICD-10-CM

## 2020-04-27 DIAGNOSIS — E03.8 HYPOTHYROIDISM DUE TO HASHIMOTO'S THYROIDITIS: ICD-10-CM

## 2020-04-27 DIAGNOSIS — E78.2 MIXED HYPERLIPIDEMIA: ICD-10-CM

## 2020-04-27 DIAGNOSIS — E06.3 HYPOTHYROIDISM DUE TO HASHIMOTO'S THYROIDITIS: ICD-10-CM

## 2020-04-27 DIAGNOSIS — I10 ESSENTIAL HYPERTENSION: ICD-10-CM

## 2020-04-27 DIAGNOSIS — M17.0 PRIMARY OSTEOARTHRITIS OF BOTH KNEES: ICD-10-CM

## 2020-04-27 DIAGNOSIS — Z00.00 MEDICARE ANNUAL WELLNESS VISIT, SUBSEQUENT: ICD-10-CM

## 2020-04-27 DIAGNOSIS — R60.0 BILATERAL LEG EDEMA: ICD-10-CM

## 2020-04-27 PROCEDURE — 99214 OFFICE O/P EST MOD 30 MIN: CPT | Performed by: FAMILY MEDICINE

## 2020-04-27 PROCEDURE — 4010F ACE/ARB THERAPY RXD/TAKEN: CPT | Performed by: FAMILY MEDICINE

## 2020-04-27 PROCEDURE — 3074F SYST BP LT 130 MM HG: CPT | Performed by: FAMILY MEDICINE

## 2020-04-27 PROCEDURE — 1036F TOBACCO NON-USER: CPT | Performed by: FAMILY MEDICINE

## 2020-04-27 PROCEDURE — G0439 PPPS, SUBSEQ VISIT: HCPCS | Performed by: FAMILY MEDICINE

## 2020-04-27 PROCEDURE — 3044F HG A1C LEVEL LT 7.0%: CPT | Performed by: FAMILY MEDICINE

## 2020-04-27 PROCEDURE — 1125F AMNT PAIN NOTED PAIN PRSNT: CPT | Performed by: FAMILY MEDICINE

## 2020-04-27 PROCEDURE — 4040F PNEUMOC VAC/ADMIN/RCVD: CPT | Performed by: FAMILY MEDICINE

## 2020-04-27 PROCEDURE — 1160F RVW MEDS BY RX/DR IN RCRD: CPT | Performed by: FAMILY MEDICINE

## 2020-04-27 PROCEDURE — 3008F BODY MASS INDEX DOCD: CPT | Performed by: FAMILY MEDICINE

## 2020-04-27 PROCEDURE — 2022F DILAT RTA XM EVC RTNOPTHY: CPT | Performed by: FAMILY MEDICINE

## 2020-04-27 PROCEDURE — 3078F DIAST BP <80 MM HG: CPT | Performed by: FAMILY MEDICINE

## 2020-04-27 PROCEDURE — 1170F FXNL STATUS ASSESSED: CPT | Performed by: FAMILY MEDICINE

## 2020-04-27 RX ORDER — VALSARTAN AND HYDROCHLOROTHIAZIDE 160; 12.5 MG/1; MG/1
1 TABLET, FILM COATED ORAL DAILY
Qty: 90 TABLET | Refills: 1 | Status: SHIPPED | OUTPATIENT
Start: 2020-04-27 | End: 2020-11-10

## 2020-04-30 ENCOUNTER — TELEPHONE (OUTPATIENT)
Dept: OBGYN CLINIC | Facility: MEDICAL CENTER | Age: 85
End: 2020-04-30

## 2020-06-05 DIAGNOSIS — R23.2 HOT FLASHES: ICD-10-CM

## 2020-06-05 RX ORDER — CONJUGATED ESTROGENS 0.3 MG/1
TABLET, FILM COATED ORAL
Qty: 64 TABLET | Refills: 0 | Status: SHIPPED | OUTPATIENT
Start: 2020-06-05 | End: 2020-08-19

## 2020-06-15 DIAGNOSIS — E78.2 MIXED HYPERLIPIDEMIA: ICD-10-CM

## 2020-06-15 DIAGNOSIS — M19.90 ARTHRITIS: ICD-10-CM

## 2020-06-15 RX ORDER — EZETIMIBE/SIMVASTATIN 10 MG-20MG
TABLET ORAL
Qty: 90 TABLET | Refills: 1 | Status: SHIPPED | OUTPATIENT
Start: 2020-06-15 | End: 2020-11-20

## 2020-06-17 ENCOUNTER — TELEPHONE (OUTPATIENT)
Dept: FAMILY MEDICINE CLINIC | Facility: CLINIC | Age: 85
End: 2020-06-17

## 2020-06-18 DIAGNOSIS — I10 ESSENTIAL HYPERTENSION: Primary | ICD-10-CM

## 2020-06-18 RX ORDER — AMLODIPINE BESYLATE 5 MG/1
5 TABLET ORAL DAILY
Qty: 90 TABLET | Refills: 3 | Status: SHIPPED | OUTPATIENT
Start: 2020-06-18 | End: 2021-05-03

## 2020-07-24 LAB
LEFT EYE DIABETIC RETINOPATHY: NORMAL
RIGHT EYE DIABETIC RETINOPATHY: NORMAL
SEVERITY (EYE EXAM): NORMAL

## 2020-08-19 DIAGNOSIS — R23.2 HOT FLASHES: ICD-10-CM

## 2020-08-19 DIAGNOSIS — I10 ESSENTIAL HYPERTENSION: ICD-10-CM

## 2020-08-19 DIAGNOSIS — R79.89 ABNORMAL TSH: ICD-10-CM

## 2020-08-19 RX ORDER — CONJUGATED ESTROGENS 0.3 MG/1
TABLET, FILM COATED ORAL
Qty: 64 TABLET | Refills: 0 | Status: SHIPPED | OUTPATIENT
Start: 2020-08-19 | End: 2020-11-08

## 2020-08-19 RX ORDER — LEVOTHYROXINE SODIUM 25 MCG
TABLET ORAL
Qty: 90 TABLET | Refills: 1 | Status: SHIPPED | OUTPATIENT
Start: 2020-08-19 | End: 2021-02-12

## 2020-08-19 RX ORDER — METOPROLOL SUCCINATE 50 MG
TABLET, EXTENDED RELEASE 24 HR ORAL
Qty: 90 TABLET | Refills: 1 | Status: SHIPPED | OUTPATIENT
Start: 2020-08-19 | End: 2021-02-12

## 2020-09-02 ENCOUNTER — OFFICE VISIT (OUTPATIENT)
Dept: FAMILY MEDICINE CLINIC | Facility: CLINIC | Age: 85
End: 2020-09-02
Payer: MEDICARE

## 2020-09-02 VITALS
HEIGHT: 62 IN | DIASTOLIC BLOOD PRESSURE: 74 MMHG | TEMPERATURE: 97.8 F | SYSTOLIC BLOOD PRESSURE: 138 MMHG | WEIGHT: 157 LBS | BODY MASS INDEX: 28.89 KG/M2

## 2020-09-02 DIAGNOSIS — I10 ESSENTIAL HYPERTENSION: ICD-10-CM

## 2020-09-02 DIAGNOSIS — E11.9 TYPE 2 DIABETES MELLITUS WITHOUT COMPLICATION, WITHOUT LONG-TERM CURRENT USE OF INSULIN (HCC): Primary | ICD-10-CM

## 2020-09-02 DIAGNOSIS — E06.3 HYPOTHYROIDISM DUE TO HASHIMOTO'S THYROIDITIS: ICD-10-CM

## 2020-09-02 DIAGNOSIS — E78.2 MIXED HYPERLIPIDEMIA: ICD-10-CM

## 2020-09-02 DIAGNOSIS — M17.0 PRIMARY OSTEOARTHRITIS OF BOTH KNEES: ICD-10-CM

## 2020-09-02 DIAGNOSIS — E03.8 HYPOTHYROIDISM DUE TO HASHIMOTO'S THYROIDITIS: ICD-10-CM

## 2020-09-02 LAB — SL AMB POCT HEMOGLOBIN AIC: 6.3 (ref ?–6.5)

## 2020-09-02 PROCEDURE — 83036 HEMOGLOBIN GLYCOSYLATED A1C: CPT | Performed by: FAMILY MEDICINE

## 2020-09-02 PROCEDURE — 99214 OFFICE O/P EST MOD 30 MIN: CPT | Performed by: FAMILY MEDICINE

## 2020-09-02 NOTE — PROGRESS NOTES
Assessment/Plan:  Patient already had high-dose flu shot for the year  A1c is 6 3  Patient will continue with current regimen for chronic conditions listed  Guidance given overall  Patient will follow-up in 4 months or as needed  Refills will be sent in automatically  Diagnoses and all orders for this visit:    Type 2 diabetes mellitus without complication, without long-term current use of insulin (HCC)  -     POCT hemoglobin A1c    Hypothyroidism due to Hashimoto's thyroiditis    Essential hypertension    Mixed hyperlipidemia    Primary osteoarthritis of both knees            Subjective:        Patient ID: Roger Mills is a 82 y o  female  Patient follow-up on diabetes hypothyroidism hypertension hyperlipidemia and osteoarthritis of the knees  Patient with nocturia x3  No dysuria hematuria no chest pain shortness of breath problems with defecation  No headache or blurred vision  Patient does have some knee pain bilaterally  The following portions of the patient's history were reviewed and updated as appropriate: allergies, current medications, past family history, past medical history, past social history, past surgical history and problem list       Review of Systems   Constitutional: Negative  HENT: Negative  Eyes: Negative  Respiratory: Negative  Cardiovascular: Negative  Gastrointestinal: Negative  Endocrine: Negative  Genitourinary: Negative  Musculoskeletal: Positive for arthralgias and gait problem  Skin: Negative  Allergic/Immunologic: Negative  Hematological: Negative  Psychiatric/Behavioral: Negative  Objective:               /74 (BP Location: Right arm, Patient Position: Sitting, Cuff Size: Adult)   Temp 97 8 °F (36 6 °C) (Tympanic)   Ht 5' 2" (1 575 m)   Wt 71 2 kg (157 lb)   LMP  (LMP Unknown)   BMI 28 72 kg/m²          Physical Exam  Vitals signs and nursing note reviewed     Constitutional:       General: She is not in acute distress  Appearance: Normal appearance  She is well-developed and normal weight  She is not diaphoretic  HENT:      Head: Normocephalic and atraumatic  Right Ear: Tympanic membrane, ear canal and external ear normal       Left Ear: Tympanic membrane, ear canal and external ear normal       Nose: Nose normal  No congestion  Mouth/Throat:      Pharynx: No oropharyngeal exudate  Eyes:      General: No scleral icterus  Right eye: No discharge  Left eye: No discharge  Pupils: Pupils are equal, round, and reactive to light  Neck:      Musculoskeletal: Normal range of motion and neck supple  Thyroid: No thyromegaly  Cardiovascular:      Rate and Rhythm: Normal rate and regular rhythm  Heart sounds: Normal heart sounds  No murmur  No friction rub  No gallop  Pulmonary:      Effort: Pulmonary effort is normal  No respiratory distress  Breath sounds: Normal breath sounds  No wheezing or rales  Chest:      Chest wall: No tenderness  Abdominal:      General: Bowel sounds are normal  There is no distension  Palpations: Abdomen is soft  Tenderness: There is no abdominal tenderness  There is no guarding or rebound  Musculoskeletal: Normal range of motion  General: No tenderness  Lymphadenopathy:      Cervical: No cervical adenopathy  Skin:     General: Skin is warm and dry  Capillary Refill: Capillary refill takes 2 to 3 seconds  Coloration: Skin is not pale  Findings: No erythema or rash  Neurological:      General: No focal deficit present  Mental Status: She is alert and oriented to person, place, and time  Mental status is at baseline  Cranial Nerves: No cranial nerve deficit  Motor: No abnormal muscle tone  Coordination: Coordination normal    Psychiatric:         Behavior: Behavior normal          Thought Content:  Thought content normal          Judgment: Judgment normal

## 2020-09-03 ENCOUNTER — TELEPHONE (OUTPATIENT)
Dept: ADMINISTRATIVE | Facility: OTHER | Age: 85
End: 2020-09-03

## 2020-09-03 NOTE — TELEPHONE ENCOUNTER
Upon review of the In Basket request we were able to locate, review, and update the patient chart as requested for Diabetic Eye Exam  Found in Media and Updated  Any additional questions or concerns should be emailed to the Practice Liaisons via [email or web address] email, please do not reply via In Basket      Thank you  Ara Robles

## 2020-09-03 NOTE — TELEPHONE ENCOUNTER
----- Message from Alek Cordon sent at 9/2/2020  3:27 PM EDT -----  Regarding: Diabetic Eye Exam/[address]  Please obtain diabetic eye exam done with Dr Iman Baker in [facility] in summer 2020 and make part of health maintenance  Thank you

## 2020-11-08 DIAGNOSIS — R23.2 HOT FLASHES: ICD-10-CM

## 2020-11-08 RX ORDER — CONJUGATED ESTROGENS 0.3 MG/1
TABLET, FILM COATED ORAL
Qty: 64 TABLET | Refills: 0 | Status: SHIPPED | OUTPATIENT
Start: 2020-11-08 | End: 2021-01-05

## 2020-11-10 DIAGNOSIS — I10 ESSENTIAL HYPERTENSION: ICD-10-CM

## 2020-11-10 RX ORDER — VALSARTAN/HYDROCHLOROTHIAZIDE 160-12.5MG
TABLET ORAL
Qty: 90 TABLET | Refills: 1 | Status: SHIPPED | OUTPATIENT
Start: 2020-11-10 | End: 2021-05-03

## 2020-11-20 DIAGNOSIS — E78.2 MIXED HYPERLIPIDEMIA: ICD-10-CM

## 2020-11-20 DIAGNOSIS — M19.90 ARTHRITIS: ICD-10-CM

## 2020-11-20 RX ORDER — EZETIMIBE/SIMVASTATIN 10 MG-20MG
TABLET ORAL
Qty: 90 TABLET | Refills: 1 | Status: SHIPPED | OUTPATIENT
Start: 2020-11-20 | End: 2021-05-03

## 2021-01-05 ENCOUNTER — OFFICE VISIT (OUTPATIENT)
Dept: FAMILY MEDICINE CLINIC | Facility: CLINIC | Age: 86
End: 2021-01-05
Payer: MEDICARE

## 2021-01-05 ENCOUNTER — TELEPHONE (OUTPATIENT)
Dept: FAMILY MEDICINE CLINIC | Facility: CLINIC | Age: 86
End: 2021-01-05

## 2021-01-05 VITALS
HEIGHT: 62 IN | WEIGHT: 156 LBS | RESPIRATION RATE: 18 BRPM | DIASTOLIC BLOOD PRESSURE: 78 MMHG | OXYGEN SATURATION: 97 % | HEART RATE: 86 BPM | SYSTOLIC BLOOD PRESSURE: 150 MMHG | TEMPERATURE: 97.8 F | BODY MASS INDEX: 28.71 KG/M2

## 2021-01-05 DIAGNOSIS — E78.2 MIXED HYPERLIPIDEMIA: ICD-10-CM

## 2021-01-05 DIAGNOSIS — E03.8 HYPOTHYROIDISM DUE TO HASHIMOTO'S THYROIDITIS: ICD-10-CM

## 2021-01-05 DIAGNOSIS — E11.9 TYPE 2 DIABETES MELLITUS WITHOUT COMPLICATION, WITHOUT LONG-TERM CURRENT USE OF INSULIN (HCC): Primary | ICD-10-CM

## 2021-01-05 DIAGNOSIS — M17.0 PRIMARY OSTEOARTHRITIS OF BOTH KNEES: ICD-10-CM

## 2021-01-05 DIAGNOSIS — I10 ESSENTIAL HYPERTENSION: ICD-10-CM

## 2021-01-05 DIAGNOSIS — E06.3 HYPOTHYROIDISM DUE TO HASHIMOTO'S THYROIDITIS: ICD-10-CM

## 2021-01-05 LAB — SL AMB POCT HEMOGLOBIN AIC: 6.3 (ref ?–6.5)

## 2021-01-05 PROCEDURE — 99214 OFFICE O/P EST MOD 30 MIN: CPT | Performed by: FAMILY MEDICINE

## 2021-01-05 PROCEDURE — 83036 HEMOGLOBIN GLYCOSYLATED A1C: CPT | Performed by: FAMILY MEDICINE

## 2021-01-05 NOTE — PROGRESS NOTES
Assessment/Plan: labs reviewed  A1c 6 3 today  Patient go for laboratory studies prior to next visit  Patient have blood pressure rechecked at follow-up  Patient continue with other medications for chronic conditions listed below which are relatively stable other than osteoarthritis of the knees  The refills will be given when needed  Follow-up in 4 months or as needed  Patient follow-up with orthopedics  Diagnoses and all orders for this visit:    Type 2 diabetes mellitus without complication, without long-term current use of insulin (HCC)  -     POCT hemoglobin A1c    Hypothyroidism due to Hashimoto's thyroiditis    Essential hypertension    Mixed hyperlipidemia    Primary osteoarthritis of both knees            Subjective:        Patient ID: Michela Parker is a 82 y o  female  Patient follow-up on diabetes hypertension hyperlipidemia hypothyroidism  Patient still with some ongoing knee discomfort with going up stairs and walking for extensive periods time  No new headaches or change in vision  No chest pain shortness of breath problems urinating or defecating  No open sores or cuts on feet  No numbness of the feet  Patient does get swelling of lower extremities bilaterally which is ongoing  The following portions of the patient's history were reviewed and updated as appropriate: allergies, current medications, past family history, past medical history, past social history, past surgical history and problem list       Review of Systems   Constitutional: Negative  HENT: Negative  Eyes: Negative  Respiratory: Negative  Cardiovascular: Positive for leg swelling  Gastrointestinal: Negative  Endocrine: Negative  Genitourinary: Negative  Musculoskeletal: Positive for arthralgias and gait problem  Skin: Negative  Allergic/Immunologic: Negative  Hematological: Negative  Psychiatric/Behavioral: Negative  Objective:      BMI Counseling:  Body mass index is 28 53 kg/m²  The BMI is above normal  Nutrition recommendations include decreasing portion sizes  Exercise recommendations include moderate physical activity 150 minutes/week  /78   Pulse 86   Temp 97 8 °F (36 6 °C) (Temporal)   Resp 18   Ht 5' 2" (1 575 m)   Wt 70 8 kg (156 lb)   LMP  (LMP Unknown)   SpO2 97%   BMI 28 53 kg/m²          Physical Exam  Vitals signs and nursing note reviewed  Constitutional:       General: She is not in acute distress  Appearance: Normal appearance  She is not ill-appearing, toxic-appearing or diaphoretic  HENT:      Head: Normocephalic and atraumatic  Right Ear: Tympanic membrane, ear canal and external ear normal  There is no impacted cerumen  Left Ear: Tympanic membrane, ear canal and external ear normal  There is no impacted cerumen  Nose: Nose normal  No congestion or rhinorrhea  Mouth/Throat:      Mouth: Mucous membranes are moist       Pharynx: No oropharyngeal exudate or posterior oropharyngeal erythema  Eyes:      General: No scleral icterus  Right eye: No discharge  Left eye: No discharge  Extraocular Movements: Extraocular movements intact  Conjunctiva/sclera: Conjunctivae normal       Pupils: Pupils are equal, round, and reactive to light  Neck:      Musculoskeletal: Normal range of motion and neck supple  No neck rigidity or muscular tenderness  Vascular: No carotid bruit  Cardiovascular:      Rate and Rhythm: Normal rate and regular rhythm  Pulses: Normal pulses  Heart sounds: Normal heart sounds  No murmur  No friction rub  No gallop  Pulmonary:      Effort: Pulmonary effort is normal  No respiratory distress  Breath sounds: Normal breath sounds  No stridor  No wheezing, rhonchi or rales  Chest:      Chest wall: No tenderness  Abdominal:      General: Abdomen is flat  Bowel sounds are normal  There is no distension  Palpations: Abdomen is soft  Tenderness: There is no abdominal tenderness  There is no guarding or rebound  Musculoskeletal:         General: No swelling, tenderness, deformity or signs of injury  Right lower leg: Edema present  Left lower leg: Edema present  Comments: Osteoarthritic changes bilateral knees  Lymphadenopathy:      Cervical: No cervical adenopathy  Skin:     General: Skin is warm and dry  Capillary Refill: Capillary refill takes less than 2 seconds  Coloration: Skin is not jaundiced  Findings: No bruising, erythema, lesion or rash  Neurological:      General: No focal deficit present  Mental Status: She is alert and oriented to person, place, and time  Cranial Nerves: No cranial nerve deficit  Sensory: No sensory deficit  Motor: No weakness  Coordination: Coordination normal       Gait: Gait normal    Psychiatric:         Mood and Affect: Mood normal          Behavior: Behavior normal          Thought Content:  Thought content normal          Judgment: Judgment normal

## 2021-02-11 DIAGNOSIS — R79.89 ABNORMAL TSH: ICD-10-CM

## 2021-02-11 DIAGNOSIS — I10 ESSENTIAL HYPERTENSION: ICD-10-CM

## 2021-02-12 RX ORDER — LEVOTHYROXINE SODIUM 25 MCG
TABLET ORAL
Qty: 90 TABLET | Refills: 1 | Status: SHIPPED | OUTPATIENT
Start: 2021-02-12 | End: 2021-07-26

## 2021-02-12 RX ORDER — METOPROLOL SUCCINATE 50 MG
TABLET, EXTENDED RELEASE 24 HR ORAL
Qty: 90 TABLET | Refills: 1 | Status: SHIPPED | OUTPATIENT
Start: 2021-02-12 | End: 2021-08-01

## 2021-03-08 DIAGNOSIS — E11.9 TYPE 2 DIABETES MELLITUS WITHOUT COMPLICATION, WITHOUT LONG-TERM CURRENT USE OF INSULIN (HCC): ICD-10-CM

## 2021-03-08 DIAGNOSIS — I10 ESSENTIAL HYPERTENSION: ICD-10-CM

## 2021-03-08 RX ORDER — CEPHALEXIN 500 MG/1
CAPSULE ORAL
Qty: 10 CAPSULE | Refills: 2 | Status: SHIPPED | OUTPATIENT
Start: 2021-03-08 | End: 2021-03-10

## 2021-04-22 ENCOUNTER — TELEPHONE (OUTPATIENT)
Dept: FAMILY MEDICINE CLINIC | Facility: CLINIC | Age: 86
End: 2021-04-22

## 2021-04-22 DIAGNOSIS — E11.9 TYPE 2 DIABETES MELLITUS WITHOUT COMPLICATION, WITHOUT LONG-TERM CURRENT USE OF INSULIN (HCC): ICD-10-CM

## 2021-04-22 DIAGNOSIS — I10 ESSENTIAL HYPERTENSION: Primary | ICD-10-CM

## 2021-04-22 NOTE — TELEPHONE ENCOUNTER
Labs in system  I did let a message for pt to  Rx if she is going somewhere other than a [facility] lab

## 2021-04-26 ENCOUNTER — TELEPHONE (OUTPATIENT)
Dept: FAMILY MEDICINE CLINIC | Facility: CLINIC | Age: 86
End: 2021-04-26

## 2021-04-27 ENCOUNTER — APPOINTMENT (OUTPATIENT)
Dept: LAB | Facility: MEDICAL CENTER | Age: 86
End: 2021-04-27
Payer: MEDICARE

## 2021-04-27 DIAGNOSIS — E11.9 TYPE 2 DIABETES MELLITUS WITHOUT COMPLICATION, WITHOUT LONG-TERM CURRENT USE OF INSULIN (HCC): ICD-10-CM

## 2021-04-27 DIAGNOSIS — I10 ESSENTIAL HYPERTENSION: ICD-10-CM

## 2021-04-27 LAB
ALBUMIN SERPL BCP-MCNC: 4 G/DL (ref 3.5–5)
ALP SERPL-CCNC: 69 U/L (ref 46–116)
ALT SERPL W P-5'-P-CCNC: 40 U/L (ref 12–78)
ANION GAP SERPL CALCULATED.3IONS-SCNC: 6 MMOL/L (ref 4–13)
AST SERPL W P-5'-P-CCNC: 32 U/L (ref 5–45)
BASOPHILS # BLD AUTO: 0.05 THOUSANDS/ΜL (ref 0–0.1)
BASOPHILS NFR BLD AUTO: 1 % (ref 0–1)
BILIRUB SERPL-MCNC: 0.69 MG/DL (ref 0.2–1)
BUN SERPL-MCNC: 27 MG/DL (ref 5–25)
CALCIUM SERPL-MCNC: 9.6 MG/DL (ref 8.3–10.1)
CHLORIDE SERPL-SCNC: 103 MMOL/L (ref 100–108)
CHOLEST SERPL-MCNC: 133 MG/DL (ref 50–200)
CO2 SERPL-SCNC: 27 MMOL/L (ref 21–32)
CREAT SERPL-MCNC: 0.98 MG/DL (ref 0.6–1.3)
CREAT UR-MCNC: 172 MG/DL
EOSINOPHIL # BLD AUTO: 0.26 THOUSAND/ΜL (ref 0–0.61)
EOSINOPHIL NFR BLD AUTO: 4 % (ref 0–6)
ERYTHROCYTE [DISTWIDTH] IN BLOOD BY AUTOMATED COUNT: 12.7 % (ref 11.6–15.1)
EST. AVERAGE GLUCOSE BLD GHB EST-MCNC: 140 MG/DL
GFR SERPL CREATININE-BSD FRML MDRD: 53 ML/MIN/1.73SQ M
GLUCOSE P FAST SERPL-MCNC: 137 MG/DL (ref 65–99)
HBA1C MFR BLD: 6.5 %
HCT VFR BLD AUTO: 41.3 % (ref 34.8–46.1)
HDLC SERPL-MCNC: 50 MG/DL
HGB BLD-MCNC: 13.3 G/DL (ref 11.5–15.4)
IMM GRANULOCYTES # BLD AUTO: 0.01 THOUSAND/UL (ref 0–0.2)
IMM GRANULOCYTES NFR BLD AUTO: 0 % (ref 0–2)
LDLC SERPL CALC-MCNC: 51 MG/DL (ref 0–100)
LYMPHOCYTES # BLD AUTO: 1.94 THOUSANDS/ΜL (ref 0.6–4.47)
LYMPHOCYTES NFR BLD AUTO: 30 % (ref 14–44)
MCH RBC QN AUTO: 30.5 PG (ref 26.8–34.3)
MCHC RBC AUTO-ENTMCNC: 32.2 G/DL (ref 31.4–37.4)
MCV RBC AUTO: 95 FL (ref 82–98)
MICROALBUMIN UR-MCNC: 18 MG/L (ref 0–20)
MICROALBUMIN/CREAT 24H UR: 10 MG/G CREATININE (ref 0–30)
MONOCYTES # BLD AUTO: 0.78 THOUSAND/ΜL (ref 0.17–1.22)
MONOCYTES NFR BLD AUTO: 12 % (ref 4–12)
NEUTROPHILS # BLD AUTO: 3.53 THOUSANDS/ΜL (ref 1.85–7.62)
NEUTS SEG NFR BLD AUTO: 53 % (ref 43–75)
NRBC BLD AUTO-RTO: 0 /100 WBCS
PLATELET # BLD AUTO: 194 THOUSANDS/UL (ref 149–390)
PMV BLD AUTO: 13.6 FL (ref 8.9–12.7)
POTASSIUM SERPL-SCNC: 3.9 MMOL/L (ref 3.5–5.3)
PROT SERPL-MCNC: 7.2 G/DL (ref 6.4–8.2)
RBC # BLD AUTO: 4.36 MILLION/UL (ref 3.81–5.12)
SODIUM SERPL-SCNC: 136 MMOL/L (ref 136–145)
TRIGL SERPL-MCNC: 160 MG/DL
WBC # BLD AUTO: 6.57 THOUSAND/UL (ref 4.31–10.16)

## 2021-04-27 PROCEDURE — 36415 COLL VENOUS BLD VENIPUNCTURE: CPT

## 2021-04-27 PROCEDURE — 82570 ASSAY OF URINE CREATININE: CPT

## 2021-04-27 PROCEDURE — 80061 LIPID PANEL: CPT

## 2021-04-27 PROCEDURE — 83036 HEMOGLOBIN GLYCOSYLATED A1C: CPT

## 2021-04-27 PROCEDURE — 85025 COMPLETE CBC W/AUTO DIFF WBC: CPT

## 2021-04-27 PROCEDURE — 80053 COMPREHEN METABOLIC PANEL: CPT

## 2021-04-27 PROCEDURE — 82043 UR ALBUMIN QUANTITATIVE: CPT

## 2021-05-03 ENCOUNTER — OFFICE VISIT (OUTPATIENT)
Dept: FAMILY MEDICINE CLINIC | Facility: CLINIC | Age: 86
End: 2021-05-03
Payer: MEDICARE

## 2021-05-03 VITALS
WEIGHT: 152 LBS | HEIGHT: 62 IN | BODY MASS INDEX: 27.97 KG/M2 | DIASTOLIC BLOOD PRESSURE: 78 MMHG | TEMPERATURE: 97.5 F | SYSTOLIC BLOOD PRESSURE: 150 MMHG

## 2021-05-03 DIAGNOSIS — R30.0 DYSURIA: Primary | ICD-10-CM

## 2021-05-03 DIAGNOSIS — I10 ESSENTIAL HYPERTENSION: ICD-10-CM

## 2021-05-03 DIAGNOSIS — N30.90 CYSTITIS: ICD-10-CM

## 2021-05-03 DIAGNOSIS — E78.2 MIXED HYPERLIPIDEMIA: ICD-10-CM

## 2021-05-03 DIAGNOSIS — M19.90 ARTHRITIS: ICD-10-CM

## 2021-05-03 LAB
SL AMB  POCT GLUCOSE, UA: NORMAL
SL AMB LEUKOCYTE ESTERASE,UA: ABNORMAL
SL AMB POCT BILIRUBIN,UA: NEGATIVE
SL AMB POCT BLOOD,UA: ABNORMAL
SL AMB POCT CLARITY,UA: ABNORMAL
SL AMB POCT COLOR,UA: ABNORMAL
SL AMB POCT KETONES,UA: NEGATIVE
SL AMB POCT NITRITE,UA: NEGATIVE
SL AMB POCT PH,UA: 5
SL AMB POCT SPECIFIC GRAVITY,UA: 1.02
SL AMB POCT URINE PROTEIN: ABNORMAL
SL AMB POCT UROBILINOGEN: NORMAL

## 2021-05-03 PROCEDURE — 81002 URINALYSIS NONAUTO W/O SCOPE: CPT | Performed by: FAMILY MEDICINE

## 2021-05-03 PROCEDURE — 87086 URINE CULTURE/COLONY COUNT: CPT | Performed by: FAMILY MEDICINE

## 2021-05-03 PROCEDURE — 99213 OFFICE O/P EST LOW 20 MIN: CPT | Performed by: FAMILY MEDICINE

## 2021-05-03 RX ORDER — NITROFURANTOIN 25; 75 MG/1; MG/1
100 CAPSULE ORAL 2 TIMES DAILY
Qty: 10 CAPSULE | Refills: 0 | Status: SHIPPED | OUTPATIENT
Start: 2021-05-03 | End: 2021-05-08

## 2021-05-03 RX ORDER — EZETIMIBE/SIMVASTATIN 10 MG-20MG
TABLET ORAL
Qty: 90 TABLET | Refills: 1 | Status: SHIPPED | OUTPATIENT
Start: 2021-05-03 | End: 2021-08-18

## 2021-05-03 RX ORDER — VALSARTAN/HYDROCHLOROTHIAZIDE 160-12.5MG
TABLET ORAL
Qty: 90 TABLET | Refills: 1 | Status: SHIPPED | OUTPATIENT
Start: 2021-05-03 | End: 2021-08-11

## 2021-05-03 RX ORDER — AMLODIPINE BESYLATE 5 MG
TABLET ORAL
Qty: 90 TABLET | Refills: 3 | Status: SHIPPED | OUTPATIENT
Start: 2021-05-03 | End: 2022-01-31

## 2021-05-03 NOTE — PROGRESS NOTES
Assessment/Plan:  Urine dip shows +2 leukocytes as well as trace blood  Urine will be sent for culture  Patient will increase fluids and use Macrobid twice daily for 5 days  Diagnoses and all orders for this visit:    Dysuria  -     POCT urine dip  -     Urine culture; Future  -     nitrofurantoin (MACROBID) 100 mg capsule; Take 1 capsule (100 mg total) by mouth 2 (two) times a day for 5 days    Cystitis  -     nitrofurantoin (MACROBID) 100 mg capsule; Take 1 capsule (100 mg total) by mouth 2 (two) times a day for 5 days            Subjective:        Patient ID: Larnell Dubin is a 82 y o  female  Patient is here with dysuria over the past 4 days  No gross hematuria  Patient did have a chill last night  No fever or vomiting  patient does have some back pain and suprapubic pain  Most of her pain is now on the suprapubic region  Patient is using cranberry pills  Patient with increased frequency and urgency  The following portions of the patient's history were reviewed and updated as appropriate: allergies, current medications, past family history, past medical history, past social history, past surgical history and problem list       Review of Systems   Constitutional: Negative  HENT: Negative  Eyes: Negative  Respiratory: Negative  Cardiovascular: Negative  Gastrointestinal: Negative  Endocrine: Negative  Genitourinary: Positive for dysuria, frequency and urgency  Musculoskeletal: Negative  Skin: Negative  Allergic/Immunologic: Negative  Neurological: Negative  Hematological: Negative  Psychiatric/Behavioral: Negative  Objective:               /78 (BP Location: Right arm, Patient Position: Sitting, Cuff Size: Adult)   Temp 97 5 °F (36 4 °C) (Tympanic)   Ht 5' 2" (1 575 m)   Wt 68 9 kg (152 lb)   LMP  (LMP Unknown)   BMI 27 80 kg/m²          Physical Exam  Constitutional:       General: She is not in acute distress       Appearance: Normal appearance  She is not ill-appearing, toxic-appearing or diaphoretic  HENT:      Head: Normocephalic and atraumatic  Cardiovascular:      Rate and Rhythm: Normal rate and regular rhythm  Pulses: Normal pulses  Heart sounds: Normal heart sounds  Pulmonary:      Effort: Pulmonary effort is normal       Breath sounds: Normal breath sounds  Abdominal:      General: Abdomen is flat  Bowel sounds are normal  There is no distension  Palpations: Abdomen is soft  Tenderness: There is no abdominal tenderness  There is no right CVA tenderness or left CVA tenderness  Neurological:      Mental Status: She is alert

## 2021-05-04 DIAGNOSIS — B37.9 YEAST INFECTION: Primary | ICD-10-CM

## 2021-05-04 LAB — BACTERIA UR CULT: NORMAL

## 2021-05-04 NOTE — TELEPHONE ENCOUNTER
Patient called experiencing burning and discomfort in vaginal area  Patient would like something prescribed for a yeast infection  Patient stated she was on antibiotics for ten days and is now on antibiotics for a cystitis

## 2021-05-05 RX ORDER — FLUCONAZOLE 150 MG/1
150 TABLET ORAL ONCE
Qty: 1 TABLET | Refills: 1 | Status: SHIPPED | OUTPATIENT
Start: 2021-05-05 | End: 2021-05-05

## 2021-05-06 ENCOUNTER — TELEPHONE (OUTPATIENT)
Dept: FAMILY MEDICINE CLINIC | Facility: CLINIC | Age: 86
End: 2021-05-06

## 2021-05-06 NOTE — TELEPHONE ENCOUNTER
Call patient  Urine culture shows mixed contaminants    Continue with antibiotic and other treatment as directed

## 2021-05-06 NOTE — TELEPHONE ENCOUNTER
Patient called in for culture results  Patient states she does feel like she is doing better on the antibiotic but was just checking that nothing needed to be changed  Please review results and advise

## 2021-05-13 ENCOUNTER — OFFICE VISIT (OUTPATIENT)
Dept: FAMILY MEDICINE CLINIC | Facility: CLINIC | Age: 86
End: 2021-05-13
Payer: MEDICARE

## 2021-05-13 VITALS
BODY MASS INDEX: 29.04 KG/M2 | HEIGHT: 61 IN | TEMPERATURE: 96.7 F | SYSTOLIC BLOOD PRESSURE: 120 MMHG | WEIGHT: 153.8 LBS | DIASTOLIC BLOOD PRESSURE: 60 MMHG

## 2021-05-13 DIAGNOSIS — Z23 ENCOUNTER FOR IMMUNIZATION: ICD-10-CM

## 2021-05-13 DIAGNOSIS — E55.9 VITAMIN D DEFICIENCY: ICD-10-CM

## 2021-05-13 DIAGNOSIS — E06.3 HYPOTHYROIDISM DUE TO HASHIMOTO'S THYROIDITIS: ICD-10-CM

## 2021-05-13 DIAGNOSIS — E78.2 MIXED HYPERLIPIDEMIA: ICD-10-CM

## 2021-05-13 DIAGNOSIS — Z00.00 ROUTINE GENERAL MEDICAL EXAMINATION AT A HEALTH CARE FACILITY: Primary | ICD-10-CM

## 2021-05-13 DIAGNOSIS — E03.8 HYPOTHYROIDISM DUE TO HASHIMOTO'S THYROIDITIS: ICD-10-CM

## 2021-05-13 DIAGNOSIS — E11.9 TYPE 2 DIABETES MELLITUS WITHOUT COMPLICATION, WITHOUT LONG-TERM CURRENT USE OF INSULIN (HCC): ICD-10-CM

## 2021-05-13 DIAGNOSIS — M85.80 OSTEOPENIA, UNSPECIFIED LOCATION: ICD-10-CM

## 2021-05-13 DIAGNOSIS — E53.8 VITAMIN B12 DEFICIENCY: ICD-10-CM

## 2021-05-13 DIAGNOSIS — Z00.00 MEDICARE ANNUAL WELLNESS VISIT, SUBSEQUENT: ICD-10-CM

## 2021-05-13 PROCEDURE — G0438 PPPS, INITIAL VISIT: HCPCS | Performed by: FAMILY MEDICINE

## 2021-05-13 PROCEDURE — 1123F ACP DISCUSS/DSCN MKR DOCD: CPT | Performed by: FAMILY MEDICINE

## 2021-05-13 NOTE — PROGRESS NOTES
Diabetic Foot Exam    Patient's shoes and socks removed  Right Foot/Ankle   Right Foot Inspection  Skin Exam: skin normal and skin intact no dry skin, no warmth, no callus, no erythema, no maceration, no abnormal color, no pre-ulcer, no ulcer and no callus                          Toe Exam: ROM and strength within normal limits  Sensory       Monofilament testing: intact  Vascular  Capillary refills: < 3 seconds  The right DP pulse is 2+  The right PT pulse is 2+  Left Foot/Ankle  Left Foot Inspection  Skin Exam: skin normal and skin intactno dry skin, no warmth, no erythema, no maceration, normal color, no pre-ulcer, no ulcer and no callus                         Toe Exam: ROM and strength within normal limits and swelling                   Sensory       Monofilament: intact  Vascular  Capillary refills: < 3 seconds  The left DP pulse is 2+  The left PT pulse is 2+  Assign Risk Category:  No deformity present; No loss of protective sensation; No weak pulses       Risk: 0     Assessment and Plan:     Problem List Items Addressed This Visit     None        BMI Counseling: Body mass index is 29 06 kg/m²  The BMI is above normal  Nutrition recommendations include decreasing portion sizes  Exercise recommendations include moderate physical activity 150 minutes/week  Depression Screening and Follow-up Plan: Patient's depression screening was positive with a PHQ-2 score of 0  Clincally patient does not have depression  No treatment is required  Preventive health issues were discussed with patient, and age appropriate screening tests were ordered as noted in patient's After Visit Summary  Personalized health advice and appropriate referrals for health education or preventive services given if needed, as noted in patient's After Visit Summary       History of Present Illness:     Patient presents for Medicare Annual Wellness visit    Patient Care Team:  Royal Radha DO as PCP - General Davy Tolentino DO     Problem List:     Patient Active Problem List   Diagnosis    Bilateral leg edema    Hyperlipidemia    Hypertension    Osteopenia    Vitamin B12 deficiency    Vitamin D deficiency    Hypothyroidism due to Hashimoto's thyroiditis    Acute non-recurrent maxillary sinusitis    Type 2 diabetes mellitus without complication, without long-term current use of insulin (HCC)    Primary osteoarthritis of both knees    Cystitis      Past Medical and Surgical History:     Past Medical History:   Diagnosis Date    Administration of long-term prophylactic antibiotics 04/29/2014    Need for Prophylactic Antibiotics     Past Surgical History:   Procedure Laterality Date    TOTAL ABDOMINAL HYSTERECTOMY W/ BILATERAL SALPINGOOPHORECTOMY      with Entercele Repair      Family History:     Family History   Problem Relation Age of Onset    No Known Problems Mother       Social History:     E-Cigarette/Vaping    E-Cigarette Use Never User      E-Cigarette/Vaping Substances    Nicotine No     THC No     CBD No     Flavoring No     Other No     Unknown No      Social History     Socioeconomic History    Marital status: /Civil Union     Spouse name: Not on file    Number of children: Not on file    Years of education: Not on file    Highest education level: Not on file   Occupational History    Not on file   Social Needs    Financial resource strain: Not on file    Food insecurity     Worry: Not on file     Inability: Not on file   [employer] needs     Medical: Not on file     Non-medical: Not on file   Tobacco Use    Smoking status: Former Smoker    Smokeless tobacco: Never Used   Substance and Sexual Activity    Alcohol use: Yes     Comment: Social drinker    Drug use: No    Sexual activity: Not Currently   Lifestyle    Physical activity     Days per week: Not on file     Minutes per session: Not on file    Stress: Not on file   Relationships    Social connections     Talks on phone: Not on file     Gets together: Not on file     Attends Denominational service: Not on file     Active member of club or organization: Not on file     Attends meetings of clubs or organizations: Not on file     Relationship status: Not on file    Intimate partner violence     Fear of current or ex partner: Not on file     Emotionally abused: Not on file     Physically abused: Not on file     Forced sexual activity: Not on file   Other Topics Concern    Not on file   Social History Narrative    Not on file      Medications and Allergies:     Current Outpatient Medications   Medication Sig Dispense Refill    aspirin 81 MG tablet Take 1 tablet by mouth daily      bimatoprost (LUMIGAN) 0 01 % ophthalmic drops Apply 1 drop to eye Daily      Brinzolamide-Brimonidine (SIMBRINZA) 1-0 2 % SUSP Apply to eye      CeleBREX 200 MG capsule TAKE ONE CAPSULE BY MOUTH DAILY WITH A MEAL 90 capsule 1    co-enzyme Q-10 30 MG capsule Take 30 mg by mouth daily      Coenzyme Q10 (COQ10) 100 MG CAPS Take 1 capsule by mouth daily      Cranberry 450 MG TABS Take by mouth      Diovan -12 5 MG per tablet TAKE 1 TABLET BY MOUTH EVERY DAY 90 tablet 1    Loratadine (CLARITIN) 10 MG CAPS Take by mouth      Multiple Vitamins-Minerals (CENTRUM SILVER ULTRA WOMENS PO) Take 1 tablet by mouth daily      Norvasc 5 MG tablet TAKE 1 TABLET BY MOUTH EVERY DAY 90 tablet 3    Olopatadine HCl (PAZEO) 0 7 % SOLN Apply to eye      Omega-3 Fatty Acids (FISH OIL) 1,000 mg Take 1 capsule by mouth daily      polyethylene glycol-propylene glycol (SYSTANE ULTRA) 0 4-0 3 % Apply to eye      PREMARIN 0 3 MG tablet TAKE 1 TABLET BY MOUTH ONCE DAILY 5 DAYS PER WEEK (Patient not taking: Reported on 1/5/2021) 34 tablet 2    Probiotic Product (ALIGN PO) Take by mouth daily      Synthroid 25 MCG tablet TAKE 1 TABLET BY MOUTH EVERY DAY 90 tablet 1    Toprol XL 50 MG 24 hr tablet TAKE 1 TABLET BY MOUTH EVERY DAY 90 tablet 1    Vytorin 10-20 MG per tablet TAKE 1 TABLET BY MOUTH EVERY DAY 90 tablet 1     No current facility-administered medications for this visit  Allergies   Allergen Reactions    Clindamycin      Reaction Date: [id number];     Other     Penicillins     Sulfa Antibiotics       Immunizations:     Immunization History   Administered Date(s) Administered    INFLUENZA 09/23/2018, 09/12/2019, 09/01/2020    Influenza Split High Dose Preservative Free IM 10/11/2013, 09/29/2015, 09/13/2016, 10/12/2017    Pneumococcal Conjugate PCV 7 07/02/2013    Pneumococcal Polysaccharide PPV23 12/11/2014, 12/13/2016    Td (adult), adsorbed 01/25/2013    Tdap 12/15/2019    Zoster Vaccine Recombinant 06/06/2018, 11/16/2018      Health Maintenance: There are no preventive care reminders to display for this patient  There are no preventive care reminders to display for this patient  Medicare Health Risk Assessment:     LMP  (LMP Unknown)      Yina Prather is here for her Subsequent Wellness visit  Health Risk Assessment:   Patient rates overall health as excellent  Patient feels that their physical health rating is same  Patient is very satisfied with their life  Eyesight was rated as same  Hearing was rated as same  Patient feels that their emotional and mental health rating is same  Patients states they are never, rarely angry  Patient states they are sometimes unusually tired/fatigued  Pain experienced in the last 7 days has been some  Patient's pain rating has been 5/10  Patient states that she has experienced no weight loss or gain in last 6 months  Depression Screening:   PHQ-2 Score: 0      Fall Risk Screening: In the past year, patient has experienced: no history of falling in past year      Urinary Incontinence Screening:   Patient has not leaked urine accidently in the last six months  Home Safety:  Patient has trouble with stairs inside or outside of their home  Patient has working smoke alarms and has working carbon monoxide detector  Home safety hazards include: none  Nutrition:   Current diet is Regular  Medications:   Patient is currently taking over-the-counter supplements  OTC medications include: see medication list  Patient is able to manage medications  Activities of Daily Living (ADLs)/Instrumental Activities of Daily Living (IADLs):   Walk and transfer into and out of bed and chair?: Yes  Dress and groom yourself?: Yes    Bathe or shower yourself?: Yes    Feed yourself? Yes  Do your laundry/housekeeping?: Yes  Manage your money, pay your bills and track your expenses?: Yes  Make your own meals?: Yes    Do your own shopping?: Yes    Previous Hospitalizations:   Any hospitalizations or ED visits within the last 12 months?: No      Advance Care Planning:   Living will: Yes    Durable POA for healthcare:  Yes    Advanced directive: Yes      PREVENTIVE SCREENINGS      Cardiovascular Screening:    General: Screening Not Indicated, History Lipid Disorder and Risks and Benefits Discussed      Diabetes Screening:     General: Screening Not Indicated, History Diabetes and Risks and Benefits Discussed      Colorectal Cancer Screening:     General: Risks and Benefits Discussed and Screening Not Indicated      Breast Cancer Screening:     General: Screening Current, Screening Not Indicated and Risks and Benefits Discussed      Cervical Cancer Screening:    General: Screening Not Indicated and Risks and Benefits Discussed      Osteoporosis Screening:    General: Risks and Benefits Discussed and Screening Current      Abdominal Aortic Aneurysm (AAA) Screening:        General: Risks and Benefits Discussed      Lung Cancer Screening:     General: Screening Not Indicated and Risks and Benefits Discussed      Hepatitis C Screening:    General: Risks and Benefits Discussed and Screening Not Indicated    Screening, Brief Intervention, and Referral to Treatment (SBIRT)    Screening  Typical number of drinks in a day: 0  Typical number of drinks in a week: 0  Interpretation: Low risk drinking behavior  Other Counseling Topics:   Regular weightbearing exercise         Anthony Rater, DO

## 2021-05-13 NOTE — PATIENT INSTRUCTIONS
Medicare Preventive Visit Patient Instructions  Thank you for completing your Welcome to Medicare Visit or Medicare Annual Wellness Visit today  Your next wellness visit will be due in one year (5/14/2022)  The screening/preventive services that you may require over the next 5-10 years are detailed below  Some tests may not apply to you based off risk factors and/or age  Screening tests ordered at today's visit but not completed yet may show as past due  Also, please note that scanned in results may not display below  Preventive Screenings:  Service Recommendations Previous Testing/Comments   Colorectal Cancer Screening  * Colonoscopy    * Fecal Occult Blood Test (FOBT)/Fecal Immunochemical Test (FIT)  * Fecal DNA/Cologuard Test  * Flexible Sigmoidoscopy Age: 54-65 years old   Colonoscopy: every 10 years (may be performed more frequently if at higher risk)  OR  FOBT/FIT: every 1 year  OR  Cologuard: every 3 years  OR  Sigmoidoscopy: every 5 years  Screening may be recommended earlier than age 48 if at higher risk for colorectal cancer  Also, an individualized decision between you and your healthcare provider will decide whether screening between the ages of 74-80 would be appropriate  Colonoscopy: Not on file  FOBT/FIT: Not on file  Cologuard: Not on file  Sigmoidoscopy: Not on file          Breast Cancer Screening Age: 36 years old  Frequency: every 1-2 years  Not required if history of left and right mastectomy Mammogram: 12/16/2020    Screening Current   Cervical Cancer Screening Between the ages of 21-29, pap smear recommended once every 3 years  Between the ages of 33-67, can perform pap smear with HPV co-testing every 5 years     Recommendations may differ for women with a history of total hysterectomy, cervical cancer, or abnormal pap smears in past  Pap Smear: Not on file    Screening Not Indicated   Hepatitis C Screening Once for adults born between 1945 and 1965  More frequently in patients at high risk for Hepatitis C Hep C Antibody: Not on file        Diabetes Screening 1-2 times per year if you're at risk for diabetes or have pre-diabetes Fasting glucose: 137 mg/dL   A1C: 6 5 %    Screening Not Indicated  History Diabetes   Cholesterol Screening Once every 5 years if you don't have a lipid disorder  May order more often based on risk factors  Lipid panel: 04/27/2021    Screening Not Indicated  History Lipid Disorder     Other Preventive Screenings Covered by Medicare:  1  Abdominal Aortic Aneurysm (AAA) Screening: covered once if your at risk  You're considered to be at risk if you have a family history of AAA  2  Lung Cancer Screening: covers low dose CT scan once per year if you meet all of the following conditions: (1) Age 50-69; (2) No signs or symptoms of lung cancer; (3) Current smoker or have quit smoking within the last 15 years; (4) You have a tobacco smoking history of at least 30 pack years (packs per day multiplied by number of years you smoked); (5) You get a written order from a healthcare provider  3  Glaucoma Screening: covered annually if you're considered high risk: (1) You have diabetes OR (2) Family history of glaucoma OR (3)  aged 48 and older OR (3)  American aged 72 and older  3  Osteoporosis Screening: covered every 2 years if you meet one of the following conditions: (1) You're estrogen deficient and at risk for osteoporosis based off medical history and other findings; (2) Have a vertebral abnormality; (3) On glucocorticoid therapy for more than 3 months; (4) Have primary hyperparathyroidism; (5) On osteoporosis medications and need to assess response to drug therapy  · Last bone density test (DXA Scan): 03/29/2017  5  HIV Screening: covered annually if you're between the age of 12-76  Also covered annually if you are younger than 13 and older than 72 with risk factors for HIV infection   For pregnant patients, it is covered up to 3 times per pregnancy  Immunizations:  Immunization Recommendations   Influenza Vaccine Annual influenza vaccination during flu season is recommended for all persons aged >= 6 months who do not have contraindications   Pneumococcal Vaccine (Prevnar and Pneumovax)  * Prevnar = PCV13  * Pneumovax = PPSV23   Adults 25-60 years old: 1-3 doses may be recommended based on certain risk factors  Adults 72 years old: Prevnar (PCV13) vaccine recommended followed by Pneumovax (PPSV23) vaccine  If already received PPSV23 since turning 65, then PCV13 recommended at least one year after PPSV23 dose  Hepatitis B Vaccine 3 dose series if at intermediate or high risk (ex: diabetes, end stage renal disease, liver disease)   Tetanus (Td) Vaccine - COST NOT COVERED BY MEDICARE PART B Following completion of primary series, a booster dose should be given every 10 years to maintain immunity against tetanus  Td may also be given as tetanus wound prophylaxis  Tdap Vaccine - COST NOT COVERED BY MEDICARE PART B Recommended at least once for all adults  For pregnant patients, recommended with each pregnancy  Shingles Vaccine (Shingrix) - COST NOT COVERED BY MEDICARE PART B  2 shot series recommended in those aged 48 and above     Health Maintenance Due:  There are no preventive care reminders to display for this patient  Immunizations Due:  There are no preventive care reminders to display for this patient  Advance Directives   What are advance directives? Advance directives are legal documents that state your wishes and plans for medical care  These plans are made ahead of time in case you lose your ability to make decisions for yourself  Advance directives can apply to any medical decision, such as the treatments you want, and if you want to donate organs  What are the types of advance directives? There are many types of advance directives, and each state has rules about how to use them   You may choose a combination of any of the following:  · Living will: This is a written record of the treatment you want  You can also choose which treatments you do not want, which to limit, and which to stop at a certain time  This includes surgery, medicine, IV fluid, and tube feedings  · Durable power of  for healthcare [place] [facility]): This is a written record that states who you want to make healthcare choices for you when you are unable to make them for yourself  This person, called a proxy, is usually a family member or a friend  You may choose more than 1 proxy  · Do not resuscitate (DNR) order:  A DNR order is used in case your heart stops beating or you stop breathing  It is a request not to have certain forms of treatment, such as CPR  A DNR order may be included in other types of advance directives  · Medical directive: This covers the care that you want if you are in a coma, near death, or unable to make decisions for yourself  You can list the treatments you want for each condition  Treatment may include pain medicine, surgery, blood transfusions, dialysis, IV or tube feedings, and a ventilator (breathing machine)  · Values history: This document has questions about your views, beliefs, and how you feel and think about life  This information can help others choose the care that you would choose  Why are advance directives important? An advance directive helps you control your care  Although spoken wishes may be used, it is better to have your wishes written down  Spoken wishes can be misunderstood, or not followed  Treatments may be given even if you do not want them  An advance directive may make it easier for your family to make difficult choices about your care  Weight Management   Why it is important to manage your weight:  Being overweight increases your risk of health conditions such as heart disease, high blood pressure, type 2 diabetes, and certain types of cancer   It can also increase your risk for osteoarthritis, sleep apnea, and other respiratory problems  Aim for a slow, steady weight loss  Even a small amount of weight loss can lower your risk of health problems  How to lose weight safely:  A safe and healthy way to lose weight is to eat fewer calories and get regular exercise  You can lose up about 1 pound a week by decreasing the number of calories you eat by 500 calories each day  Healthy meal plan for weight management:  A healthy meal plan includes a variety of foods, contains fewer calories, and helps you stay healthy  A healthy meal plan includes the following:  · Eat whole-grain foods more often  A healthy meal plan should contain fiber  Fiber is the part of grains, fruits, and vegetables that is not broken down by your body  Whole-grain foods are healthy and provide extra fiber in your diet  Some examples of whole-grain foods are whole-wheat breads and pastas, oatmeal, brown rice, and bulgur  · Eat a variety of vegetables every day  Include dark, leafy greens such as spinach, kale, russell greens, and mustard greens  Eat yellow and orange vegetables such as carrots, sweet potatoes, and winter squash  · Eat a variety of fruits every day  Choose fresh or canned fruit (canned in its own juice or light syrup) instead of juice  Fruit juice has very little or no fiber  · Eat low-fat dairy foods  Drink fat-free (skim) milk or 1% milk  Eat fat-free yogurt and low-fat cottage cheese  Try low-fat cheeses such as mozzarella and other reduced-fat cheeses  · Choose meat and other protein foods that are low in fat  Choose beans or other legumes such as split peas or lentils  Choose fish, skinless poultry (chicken or turkey), or lean cuts of red meat (beef or pork)  Before you cook meat or poultry, cut off any visible fat  · Use less fat and oil  Try baking foods instead of frying them  Add less fat, such as margarine, sour cream, regular salad dressing and mayonnaise to foods  Eat fewer high-fat foods   Some examples of high-fat foods include french fries, doughnuts, ice cream, and cakes  · Eat fewer sweets  Limit foods and drinks that are high in sugar  This includes candy, cookies, regular soda, and sweetened drinks  Exercise:  Exercise at least 30 minutes per day on most days of the week  Some examples of exercise include walking, biking, dancing, and swimming  You can also fit in more physical activity by taking the stairs instead of the elevator or parking farther away from stores  Ask your healthcare provider about the best exercise plan for you  © Copyright [employer] 2018 Information is for End User's use only and may not be sold, redistributed or otherwise used for commercial purposes   All illustrations and images included in CareNotes® are the copyrighted property of [employer]  or [address] [place], [place] [address]grayson claude

## 2021-06-16 DIAGNOSIS — R23.2 HOT FLASHES: ICD-10-CM

## 2021-08-01 DIAGNOSIS — I10 ESSENTIAL HYPERTENSION: ICD-10-CM

## 2021-08-01 RX ORDER — METOPROLOL SUCCINATE 50 MG
TABLET, EXTENDED RELEASE 24 HR ORAL
Qty: 90 TABLET | Refills: 1 | Status: SHIPPED | OUTPATIENT
Start: 2021-08-01 | End: 2021-11-08

## 2021-08-11 DIAGNOSIS — I10 ESSENTIAL HYPERTENSION: ICD-10-CM

## 2021-08-11 DIAGNOSIS — M19.90 ARTHRITIS: ICD-10-CM

## 2021-08-11 RX ORDER — VALSARTAN/HYDROCHLOROTHIAZIDE 160-12.5MG
TABLET ORAL
Qty: 90 TABLET | Refills: 1 | Status: SHIPPED | OUTPATIENT
Start: 2021-08-11 | End: 2022-01-31

## 2021-08-18 DIAGNOSIS — E78.2 MIXED HYPERLIPIDEMIA: ICD-10-CM

## 2021-08-18 RX ORDER — EZETIMIBE/SIMVASTATIN 10 MG-20MG
TABLET ORAL
Qty: 90 TABLET | Refills: 1 | Status: SHIPPED | OUTPATIENT
Start: 2021-08-18 | End: 2021-10-01

## 2021-10-01 ENCOUNTER — OFFICE VISIT (OUTPATIENT)
Dept: FAMILY MEDICINE CLINIC | Facility: CLINIC | Age: 86
End: 2021-10-01
Payer: MEDICARE

## 2021-10-01 VITALS
BODY MASS INDEX: 28.47 KG/M2 | DIASTOLIC BLOOD PRESSURE: 72 MMHG | WEIGHT: 150.8 LBS | HEART RATE: 79 BPM | HEIGHT: 61 IN | SYSTOLIC BLOOD PRESSURE: 120 MMHG | TEMPERATURE: 98.2 F | OXYGEN SATURATION: 98 %

## 2021-10-01 DIAGNOSIS — I10 PRIMARY HYPERTENSION: ICD-10-CM

## 2021-10-01 DIAGNOSIS — E78.2 MIXED HYPERLIPIDEMIA: ICD-10-CM

## 2021-10-01 DIAGNOSIS — Z23 NEED FOR IMMUNIZATION AGAINST INFLUENZA: ICD-10-CM

## 2021-10-01 DIAGNOSIS — E11.9 TYPE 2 DIABETES MELLITUS WITHOUT COMPLICATION, WITHOUT LONG-TERM CURRENT USE OF INSULIN (HCC): Primary | ICD-10-CM

## 2021-10-01 DIAGNOSIS — E03.8 HYPOTHYROIDISM DUE TO HASHIMOTO'S THYROIDITIS: ICD-10-CM

## 2021-10-01 DIAGNOSIS — E06.3 HYPOTHYROIDISM DUE TO HASHIMOTO'S THYROIDITIS: ICD-10-CM

## 2021-10-01 DIAGNOSIS — M17.0 PRIMARY OSTEOARTHRITIS OF BOTH KNEES: ICD-10-CM

## 2021-10-01 LAB — SL AMB POCT HEMOGLOBIN AIC: 6.3 (ref ?–6.5)

## 2021-10-01 PROCEDURE — 83036 HEMOGLOBIN GLYCOSYLATED A1C: CPT | Performed by: FAMILY MEDICINE

## 2021-10-01 PROCEDURE — 99214 OFFICE O/P EST MOD 30 MIN: CPT | Performed by: FAMILY MEDICINE

## 2021-10-01 PROCEDURE — G0008 ADMIN INFLUENZA VIRUS VAC: HCPCS

## 2021-10-01 PROCEDURE — 90662 IIV NO PRSV INCREASED AG IM: CPT

## 2021-10-01 RX ORDER — EZETIMIBE 10 MG/1
10 TABLET ORAL DAILY
Qty: 90 TABLET | Refills: 3 | Status: SHIPPED | OUTPATIENT
Start: 2021-10-01 | End: 2021-11-29

## 2021-10-01 RX ORDER — CEPHALEXIN 500 MG/1
CAPSULE ORAL
Qty: 20 CAPSULE | Refills: 0 | Status: SHIPPED | OUTPATIENT
Start: 2021-10-01 | End: 2021-10-14

## 2021-10-01 RX ORDER — SIMVASTATIN 20 MG
20 TABLET ORAL
Qty: 90 TABLET | Refills: 3 | Status: SHIPPED | OUTPATIENT
Start: 2021-10-01 | End: 2021-11-29

## 2021-11-29 ENCOUNTER — TELEPHONE (OUTPATIENT)
Dept: FAMILY MEDICINE CLINIC | Facility: CLINIC | Age: 86
End: 2021-11-29

## 2021-11-29 DIAGNOSIS — E78.2 MIXED HYPERLIPIDEMIA: ICD-10-CM

## 2021-11-29 RX ORDER — EZETIMIBE 10 MG/1
10 TABLET ORAL DAILY
Qty: 90 TABLET | Refills: 3
Start: 2021-11-29

## 2021-11-29 RX ORDER — SIMVASTATIN 20 MG
20 TABLET ORAL
Qty: 90 TABLET | Refills: 3
Start: 2021-11-29

## 2021-12-06 DIAGNOSIS — E78.2 MIXED HYPERLIPIDEMIA: Primary | ICD-10-CM

## 2021-12-06 RX ORDER — EZETIMIBE AND SIMVASTATIN 10; 20 MG/1; MG/1
1 TABLET ORAL
Qty: 90 TABLET | Refills: 1 | Status: SHIPPED | OUTPATIENT
Start: 2021-12-06 | End: 2022-03-28

## 2022-01-25 ENCOUNTER — APPOINTMENT (OUTPATIENT)
Dept: LAB | Facility: MEDICAL CENTER | Age: 87
End: 2022-01-25
Payer: MEDICARE

## 2022-01-25 DIAGNOSIS — E06.3 HYPOTHYROIDISM DUE TO HASHIMOTO'S THYROIDITIS: ICD-10-CM

## 2022-01-25 DIAGNOSIS — E03.8 HYPOTHYROIDISM DUE TO HASHIMOTO'S THYROIDITIS: ICD-10-CM

## 2022-01-25 DIAGNOSIS — I10 PRIMARY HYPERTENSION: ICD-10-CM

## 2022-01-25 DIAGNOSIS — E78.2 MIXED HYPERLIPIDEMIA: ICD-10-CM

## 2022-01-25 DIAGNOSIS — E11.9 TYPE 2 DIABETES MELLITUS WITHOUT COMPLICATION, WITHOUT LONG-TERM CURRENT USE OF INSULIN (HCC): ICD-10-CM

## 2022-01-25 LAB
ALBUMIN SERPL BCP-MCNC: 3.9 G/DL (ref 3.5–5)
ALP SERPL-CCNC: 71 U/L (ref 46–116)
ALT SERPL W P-5'-P-CCNC: 38 U/L (ref 12–78)
ANION GAP SERPL CALCULATED.3IONS-SCNC: 6 MMOL/L (ref 4–13)
AST SERPL W P-5'-P-CCNC: 32 U/L (ref 5–45)
BASOPHILS # BLD AUTO: 0.04 THOUSANDS/ΜL (ref 0–0.1)
BASOPHILS NFR BLD AUTO: 1 % (ref 0–1)
BILIRUB SERPL-MCNC: 0.68 MG/DL (ref 0.2–1)
BUN SERPL-MCNC: 29 MG/DL (ref 5–25)
CALCIUM SERPL-MCNC: 8.9 MG/DL (ref 8.3–10.1)
CHLORIDE SERPL-SCNC: 102 MMOL/L (ref 100–108)
CHOLEST SERPL-MCNC: 118 MG/DL
CO2 SERPL-SCNC: 27 MMOL/L (ref 21–32)
CREAT SERPL-MCNC: 0.86 MG/DL (ref 0.6–1.3)
CREAT UR-MCNC: 93.8 MG/DL
EOSINOPHIL # BLD AUTO: 0.16 THOUSAND/ΜL (ref 0–0.61)
EOSINOPHIL NFR BLD AUTO: 2 % (ref 0–6)
ERYTHROCYTE [DISTWIDTH] IN BLOOD BY AUTOMATED COUNT: 12.6 % (ref 11.6–15.1)
EST. AVERAGE GLUCOSE BLD GHB EST-MCNC: 157 MG/DL
GFR SERPL CREATININE-BSD FRML MDRD: 62 ML/MIN/1.73SQ M
GLUCOSE P FAST SERPL-MCNC: 146 MG/DL (ref 65–99)
HBA1C MFR BLD: 7.1 %
HCT VFR BLD AUTO: 38.6 % (ref 34.8–46.1)
HDLC SERPL-MCNC: 41 MG/DL
HGB BLD-MCNC: 12.7 G/DL (ref 11.5–15.4)
IMM GRANULOCYTES # BLD AUTO: 0.03 THOUSAND/UL (ref 0–0.2)
IMM GRANULOCYTES NFR BLD AUTO: 0 % (ref 0–2)
LDLC SERPL CALC-MCNC: 41 MG/DL (ref 0–100)
LYMPHOCYTES # BLD AUTO: 1.62 THOUSANDS/ΜL (ref 0.6–4.47)
LYMPHOCYTES NFR BLD AUTO: 23 % (ref 14–44)
MCH RBC QN AUTO: 30.5 PG (ref 26.8–34.3)
MCHC RBC AUTO-ENTMCNC: 32.9 G/DL (ref 31.4–37.4)
MCV RBC AUTO: 93 FL (ref 82–98)
MICROALBUMIN UR-MCNC: 6.4 MG/L (ref 0–20)
MICROALBUMIN/CREAT 24H UR: 7 MG/G CREATININE (ref 0–30)
MONOCYTES # BLD AUTO: 0.74 THOUSAND/ΜL (ref 0.17–1.22)
MONOCYTES NFR BLD AUTO: 10 % (ref 4–12)
NEUTROPHILS # BLD AUTO: 4.59 THOUSANDS/ΜL (ref 1.85–7.62)
NEUTS SEG NFR BLD AUTO: 64 % (ref 43–75)
NRBC BLD AUTO-RTO: 0 /100 WBCS
PLATELET # BLD AUTO: 214 THOUSANDS/UL (ref 149–390)
PMV BLD AUTO: 12.4 FL (ref 8.9–12.7)
POTASSIUM SERPL-SCNC: 4.3 MMOL/L (ref 3.5–5.3)
PROT SERPL-MCNC: 7.3 G/DL (ref 6.4–8.2)
RBC # BLD AUTO: 4.17 MILLION/UL (ref 3.81–5.12)
SODIUM SERPL-SCNC: 135 MMOL/L (ref 136–145)
TRIGL SERPL-MCNC: 180 MG/DL
TSH SERPL DL<=0.05 MIU/L-ACNC: 3.01 UIU/ML (ref 0.36–3.74)
WBC # BLD AUTO: 7.18 THOUSAND/UL (ref 4.31–10.16)

## 2022-01-25 PROCEDURE — 84443 ASSAY THYROID STIM HORMONE: CPT

## 2022-01-25 PROCEDURE — 36415 COLL VENOUS BLD VENIPUNCTURE: CPT

## 2022-01-25 PROCEDURE — 80053 COMPREHEN METABOLIC PANEL: CPT

## 2022-01-25 PROCEDURE — 82043 UR ALBUMIN QUANTITATIVE: CPT | Performed by: FAMILY MEDICINE

## 2022-01-25 PROCEDURE — 80061 LIPID PANEL: CPT

## 2022-01-25 PROCEDURE — 85025 COMPLETE CBC W/AUTO DIFF WBC: CPT

## 2022-01-25 PROCEDURE — 83036 HEMOGLOBIN GLYCOSYLATED A1C: CPT

## 2022-01-25 PROCEDURE — 82570 ASSAY OF URINE CREATININE: CPT | Performed by: FAMILY MEDICINE

## 2022-01-31 DIAGNOSIS — I10 ESSENTIAL HYPERTENSION: ICD-10-CM

## 2022-01-31 DIAGNOSIS — R23.2 HOT FLASHES: ICD-10-CM

## 2022-01-31 DIAGNOSIS — M19.90 ARTHRITIS: ICD-10-CM

## 2022-01-31 RX ORDER — AMLODIPINE BESYLATE 5 MG
TABLET ORAL
Qty: 90 TABLET | Refills: 3 | Status: SHIPPED | OUTPATIENT
Start: 2022-01-31

## 2022-01-31 RX ORDER — VALSARTAN/HYDROCHLOROTHIAZIDE 160-12.5MG
TABLET ORAL
Qty: 90 TABLET | Refills: 1 | Status: SHIPPED | OUTPATIENT
Start: 2022-01-31

## 2022-02-01 ENCOUNTER — OFFICE VISIT (OUTPATIENT)
Dept: FAMILY MEDICINE CLINIC | Facility: CLINIC | Age: 87
End: 2022-02-01
Payer: MEDICARE

## 2022-02-01 VITALS
HEART RATE: 90 BPM | OXYGEN SATURATION: 99 % | TEMPERATURE: 96.1 F | BODY MASS INDEX: 28.51 KG/M2 | HEIGHT: 61 IN | DIASTOLIC BLOOD PRESSURE: 70 MMHG | WEIGHT: 151 LBS | RESPIRATION RATE: 20 BRPM | SYSTOLIC BLOOD PRESSURE: 128 MMHG

## 2022-02-01 DIAGNOSIS — E78.2 MIXED HYPERLIPIDEMIA: ICD-10-CM

## 2022-02-01 DIAGNOSIS — I10 PRIMARY HYPERTENSION: ICD-10-CM

## 2022-02-01 DIAGNOSIS — E06.3 HYPOTHYROIDISM DUE TO HASHIMOTO'S THYROIDITIS: ICD-10-CM

## 2022-02-01 DIAGNOSIS — E11.9 TYPE 2 DIABETES MELLITUS WITHOUT COMPLICATION, WITHOUT LONG-TERM CURRENT USE OF INSULIN (HCC): Primary | ICD-10-CM

## 2022-02-01 DIAGNOSIS — E03.8 HYPOTHYROIDISM DUE TO HASHIMOTO'S THYROIDITIS: ICD-10-CM

## 2022-02-01 PROCEDURE — 99214 OFFICE O/P EST MOD 30 MIN: CPT | Performed by: FAMILY MEDICINE

## 2022-02-01 NOTE — PROGRESS NOTES
Assessment/Plan:  A1c 7 1  LDL 41  TSH normal 3 0  The GFR 62 CBC normal   Urine microalbumin normal 7  Patient status post seeing ophthalmologist   Patient will continue with current regimen for diabetes hypothyroidism hypertension hyperlipidemia  Patient will continue with meds for these conditions and refills will be given when needed  Patient modify diet appropriately regarding diabetes with slight increase in A1c  Patient follow-up ophthalmology appropriately  Patient will otherwise follow-up in 4 months or as needed       Diagnoses and all orders for this visit:    Type 2 diabetes mellitus without complication, without long-term current use of insulin ([address] )    Hypothyroidism due to Hashimoto's thyroiditis    Primary hypertension    Mixed hyperlipidemia            Subjective:        Patient ID: Edgar Sun is a 82 y o  female  Patient follow-up on diabetes hypothyroidism hypertension hyperlipidemia  Patient has had recent cold and has increased amount of honey and fruit she has routinely taking  Patient with chronic knee pain  No new chest pain shortness of breath or cough or fevers or chills  No change in urination defecation  Vision stable  Lower extremity edema stable overall  The following portions of the patient's history were reviewed and updated as appropriate: allergies, current medications, past family history, past medical history, past social history, past surgical history and problem list       Review of Systems   Constitutional: Negative  HENT: Negative  Eyes: Negative  Respiratory: Negative  Cardiovascular: Positive for leg swelling  Gastrointestinal: Negative  Endocrine: Negative  Genitourinary: Negative  Musculoskeletal: Positive for arthralgias and gait problem  Skin: Negative  Allergic/Immunologic: Negative  Hematological: Negative  Psychiatric/Behavioral: Negative  Objective:      BMI Counseling:  Body mass index is 28 53 kg/m²  The BMI is above normal  Nutrition recommendations include decreasing portion sizes  Exercise recommendations include exercising 3-5 times per week  Rationale for BMI follow-up plan is due to patient being overweight or obese  Depression Screening and Follow-up Plan: Clincally patient does not have depression  No treatment is required  /76 (BP Location: Right arm, Patient Position: Sitting, Cuff Size: Standard)   Pulse 90   Temp (!) 96 1 °F (35 6 °C) (Tympanic)   Resp 20   Ht 5' 1" (1 549 m)   Wt 68 5 kg (151 lb)   LMP  (LMP Unknown)   SpO2 99%   BMI 28 53 kg/m²          Physical Exam  Vitals and nursing note reviewed  Constitutional:       General: She is not in acute distress  Appearance: Normal appearance  She is not ill-appearing, toxic-appearing or diaphoretic  HENT:      Head: Normocephalic and atraumatic  Right Ear: Tympanic membrane, ear canal and external ear normal  There is no impacted cerumen  Left Ear: Tympanic membrane, ear canal and external ear normal  There is no impacted cerumen  Nose: Nose normal  No congestion or rhinorrhea  Mouth/Throat:      Mouth: Mucous membranes are moist       Pharynx: No oropharyngeal exudate or posterior oropharyngeal erythema  Eyes:      General: No scleral icterus  Right eye: No discharge  Left eye: No discharge  Extraocular Movements: Extraocular movements intact  Conjunctiva/sclera: Conjunctivae normal       Pupils: Pupils are equal, round, and reactive to light  Neck:      Vascular: No carotid bruit  Cardiovascular:      Rate and Rhythm: Normal rate and regular rhythm  Pulses: Normal pulses  Heart sounds: Normal heart sounds  No murmur heard  No friction rub  No gallop  Pulmonary:      Effort: Pulmonary effort is normal  No respiratory distress  Breath sounds: Normal breath sounds  No stridor  No wheezing, rhonchi or rales     Chest:      Chest wall: No tenderness  Musculoskeletal:         General: No swelling, tenderness, deformity or signs of injury  Normal range of motion  Cervical back: Normal range of motion and neck supple  No rigidity  No muscular tenderness  Right lower leg: No edema  Left lower leg: No edema  Lymphadenopathy:      Cervical: No cervical adenopathy  Skin:     General: Skin is warm and dry  Capillary Refill: Capillary refill takes less than 2 seconds  Coloration: Skin is not jaundiced  Findings: No bruising, erythema, lesion or rash  Neurological:      Mental Status: She is alert and oriented to person, place, and time  Mental status is at baseline  Cranial Nerves: No cranial nerve deficit  Sensory: No sensory deficit  Motor: No weakness  Coordination: Coordination normal       Gait: Gait normal    Psychiatric:         Mood and Affect: Mood normal          Behavior: Behavior normal          Thought Content:  Thought content normal          Judgment: Judgment normal

## 2022-02-04 DIAGNOSIS — R39.9 UTI SYMPTOMS: Primary | ICD-10-CM

## 2022-02-04 RX ORDER — FLUCONAZOLE 150 MG/1
150 TABLET ORAL ONCE
Qty: 2 TABLET | Refills: 0 | Status: SHIPPED | OUTPATIENT
Start: 2022-02-04 | End: 2022-02-04

## 2022-02-04 NOTE — TELEPHONE ENCOUNTER
PT IS HAVING VAG ITCH , ODOR ,DISCHARGE AND  VAGINAL BURNING    PT SAID YOU GAVE HER A PILL LAST TIME FOR THIS ISSUE AND IF YOU CAN GIVE HER THIS AGAIN PLEASE

## 2022-02-07 ENCOUNTER — OFFICE VISIT (OUTPATIENT)
Dept: FAMILY MEDICINE CLINIC | Facility: CLINIC | Age: 87
End: 2022-02-07
Payer: MEDICARE

## 2022-02-07 VITALS
HEART RATE: 79 BPM | OXYGEN SATURATION: 98 % | RESPIRATION RATE: 16 BRPM | SYSTOLIC BLOOD PRESSURE: 126 MMHG | HEIGHT: 61 IN | BODY MASS INDEX: 27.75 KG/M2 | WEIGHT: 147 LBS | DIASTOLIC BLOOD PRESSURE: 78 MMHG | TEMPERATURE: 96.9 F

## 2022-02-07 DIAGNOSIS — S61.211A LACERATION OF LEFT INDEX FINGER WITHOUT FOREIGN BODY WITHOUT DAMAGE TO NAIL, INITIAL ENCOUNTER: Primary | ICD-10-CM

## 2022-02-07 DIAGNOSIS — R30.0 DYSURIA: ICD-10-CM

## 2022-02-07 LAB
BACTERIA UR QL AUTO: ABNORMAL /HPF
BILIRUB UR QL STRIP: NEGATIVE
CLARITY UR: CLEAR
COLOR UR: YELLOW
GLUCOSE UR STRIP-MCNC: NEGATIVE MG/DL
HGB UR QL STRIP.AUTO: NEGATIVE
HYALINE CASTS #/AREA URNS LPF: ABNORMAL /LPF
KETONES UR STRIP-MCNC: NEGATIVE MG/DL
LEUKOCYTE ESTERASE UR QL STRIP: ABNORMAL
NITRITE UR QL STRIP: NEGATIVE
NON-SQ EPI CELLS URNS QL MICRO: ABNORMAL /HPF
PH UR STRIP.AUTO: 6 [PH]
PROT UR STRIP-MCNC: NEGATIVE MG/DL
RBC #/AREA URNS AUTO: ABNORMAL /HPF
SP GR UR STRIP.AUTO: 1.02 (ref 1–1.03)
UROBILINOGEN UR QL STRIP.AUTO: 0.2 E.U./DL
WBC #/AREA URNS AUTO: ABNORMAL /HPF

## 2022-02-07 PROCEDURE — 99213 OFFICE O/P EST LOW 20 MIN: CPT | Performed by: FAMILY MEDICINE

## 2022-02-07 PROCEDURE — 81001 URINALYSIS AUTO W/SCOPE: CPT | Performed by: FAMILY MEDICINE

## 2022-02-07 PROCEDURE — 87086 URINE CULTURE/COLONY COUNT: CPT | Performed by: FAMILY MEDICINE

## 2022-02-07 NOTE — PROGRESS NOTES
Assessment/Plan:  Tetanus shot up-to-date  Guidance given overall  Patient will continue with Neosporin as well as keeping the area clean and dry and using dry sterile dressing /bandage  Follow-up per routine  Diagnoses and all orders for this visit:    Laceration of left index finger without foreign body without damage to nail, initial encounter            Subjective:        Patient ID: Jocelyn Desai is a 82 y o  female  Patient is here status post puncture wound to the left index finger occurring yesterday roughly 6:00 p m  Brandon Duran Patient did clean the area and did apply antibiotic ointment and dry sterile dressing  Patient with full range of motion  No numbness noted  Tetanus shot December of 2019  The following portions of the patient's history were reviewed and updated as appropriate: allergies, current medications, past family history, past medical history, past social history, past surgical history and problem list       Review of Systems   Constitutional: Negative  HENT: Negative  Eyes: Negative  Respiratory: Negative  Cardiovascular: Negative  Gastrointestinal: Negative  Endocrine: Negative  Genitourinary: Negative  Musculoskeletal: Negative  Skin: Positive for wound  Allergic/Immunologic: Negative  Neurological: Negative  Hematological: Negative  Psychiatric/Behavioral: Negative  Objective:               /78 (BP Location: Right arm, Patient Position: Sitting, Cuff Size: Adult)   Pulse 79   Temp (!) 96 9 °F (36 1 °C) (Tympanic)   Resp 16   Ht 5' 1" (1 549 m)   Wt 66 7 kg (147 lb)   LMP  (LMP Unknown)   SpO2 98%   BMI 27 78 kg/m²          Physical Exam  Constitutional:       Appearance: Normal appearance  Skin:     Comments: Left 2nd finger with puncture wound on the lateral aspect near PIP joint  No active bleeding noted  Patient with full range of motion of finger  No numbness noted light touch  No redness or discharge  Neurological:      Mental Status: She is alert  Psychiatric:         Mood and Affect: Mood normal          Behavior: Behavior normal          Thought Content:  Thought content normal

## 2022-02-08 ENCOUNTER — TELEPHONE (OUTPATIENT)
Dept: FAMILY MEDICINE CLINIC | Facility: CLINIC | Age: 87
End: 2022-02-08

## 2022-02-08 LAB — BACTERIA UR CULT: NORMAL

## 2022-03-27 DIAGNOSIS — R79.89 ABNORMAL TSH: ICD-10-CM

## 2022-03-27 DIAGNOSIS — E78.2 MIXED HYPERLIPIDEMIA: ICD-10-CM

## 2022-03-28 RX ORDER — LEVOTHYROXINE SODIUM 25 MCG
TABLET ORAL
Qty: 90 TABLET | Refills: 1 | Status: SHIPPED | OUTPATIENT
Start: 2022-03-28

## 2022-03-28 RX ORDER — EZETIMIBE/SIMVASTATIN 10 MG-20MG
TABLET ORAL
Qty: 90 TABLET | Refills: 1 | Status: SHIPPED | OUTPATIENT
Start: 2022-03-28

## 2022-06-10 ENCOUNTER — RA CDI HCC (OUTPATIENT)
Dept: OTHER | Facility: HOSPITAL | Age: 87
End: 2022-06-10

## 2022-06-10 NOTE — PROGRESS NOTES
[address]  coding opportunities       Chart reviewed, no opportunity found: CHART REVIEWED, NO OPPORTUNITY FOUND        Patients Insurance     Medicare Insurance: Medicare

## 2022-06-16 ENCOUNTER — OFFICE VISIT (OUTPATIENT)
Dept: FAMILY MEDICINE CLINIC | Facility: CLINIC | Age: 87
End: 2022-06-16
Payer: MEDICARE

## 2022-06-16 VITALS
SYSTOLIC BLOOD PRESSURE: 132 MMHG | TEMPERATURE: 98.2 F | HEART RATE: 79 BPM | WEIGHT: 150.4 LBS | BODY MASS INDEX: 28.4 KG/M2 | OXYGEN SATURATION: 98 % | HEIGHT: 61 IN | DIASTOLIC BLOOD PRESSURE: 78 MMHG

## 2022-06-16 DIAGNOSIS — Z12.31 ENCOUNTER FOR SCREENING MAMMOGRAM FOR MALIGNANT NEOPLASM OF BREAST: Primary | ICD-10-CM

## 2022-06-16 DIAGNOSIS — Z00.00 MEDICARE ANNUAL WELLNESS VISIT, SUBSEQUENT: ICD-10-CM

## 2022-06-16 DIAGNOSIS — M85.88 OSTEOPENIA OF OTHER SITE: ICD-10-CM

## 2022-06-16 DIAGNOSIS — Z00.00 WELCOME TO MEDICARE PREVENTIVE VISIT: ICD-10-CM

## 2022-06-16 DIAGNOSIS — E11.9 TYPE 2 DIABETES MELLITUS WITHOUT COMPLICATION, WITHOUT LONG-TERM CURRENT USE OF INSULIN (HCC): ICD-10-CM

## 2022-06-16 LAB — SL AMB POCT HEMOGLOBIN AIC: 6.5 (ref ?–6.5)

## 2022-06-16 PROCEDURE — 83036 HEMOGLOBIN GLYCOSYLATED A1C: CPT | Performed by: FAMILY MEDICINE

## 2022-06-16 PROCEDURE — G0439 PPPS, SUBSEQ VISIT: HCPCS | Performed by: FAMILY MEDICINE

## 2022-06-16 NOTE — PATIENT INSTRUCTIONS
Medicare Preventive Visit Patient Instructions  Thank you for completing your Welcome to Medicare Visit or Medicare Annual Wellness Visit today  Your next wellness visit will be due in one year (6/17/2023)  The screening/preventive services that you may require over the next 5-10 years are detailed below  Some tests may not apply to you based off risk factors and/or age  Screening tests ordered at today's visit but not completed yet may show as past due  Also, please note that scanned in results may not display below  Preventive Screenings:  Service Recommendations Previous Testing/Comments   Colorectal Cancer Screening  * Colonoscopy    * Fecal Occult Blood Test (FOBT)/Fecal Immunochemical Test (FIT)  * Fecal DNA/Cologuard Test  * Flexible Sigmoidoscopy Age: 54-65 years old   Colonoscopy: every 10 years (may be performed more frequently if at higher risk)  OR  FOBT/FIT: every 1 year  OR  Cologuard: every 3 years  OR  Sigmoidoscopy: every 5 years  Screening may be recommended earlier than age 48 if at higher risk for colorectal cancer  Also, an individualized decision between you and your healthcare provider will decide whether screening between the ages of 74-80 would be appropriate  Colonoscopy: Not on file  FOBT/FIT: Not on file  Cologuard: Not on file  Sigmoidoscopy: Not on file    Screening Not Indicated     Breast Cancer Screening Age: 36 years old  Frequency: every 1-2 years  Not required if history of left and right mastectomy Mammogram: 12/16/2020    Screening Current   Cervical Cancer Screening Between the ages of 21-29, pap smear recommended once every 3 years  Between the ages of 33-67, can perform pap smear with HPV co-testing every 5 years     Recommendations may differ for women with a history of total hysterectomy, cervical cancer, or abnormal pap smears in past  Pap Smear: Not on file    Screening Not Indicated   Hepatitis C Screening Once for adults born between 1945 and 1965  More frequently in patients at high risk for Hepatitis C Hep C Antibody: Not on file        Diabetes Screening 1-2 times per year if you're at risk for diabetes or have pre-diabetes Fasting glucose: 146 mg/dL   A1C: 6 5    Screening Not Indicated  History Diabetes   Cholesterol Screening Once every 5 years if you don't have a lipid disorder  May order more often based on risk factors  Lipid panel: 01/25/2022    Screening Not Indicated  History Lipid Disorder     Other Preventive Screenings Covered by Medicare:  1  Abdominal Aortic Aneurysm (AAA) Screening: covered once if your at risk  You're considered to be at risk if you have a family history of AAA  2  Lung Cancer Screening: covers low dose CT scan once per year if you meet all of the following conditions: (1) Age 50-69; (2) No signs or symptoms of lung cancer; (3) Current smoker or have quit smoking within the last 15 years; (4) You have a tobacco smoking history of at least 30 pack years (packs per day multiplied by number of years you smoked); (5) You get a written order from a healthcare provider  3  Glaucoma Screening: covered annually if you're considered high risk: (1) You have diabetes OR (2) Family history of glaucoma OR (3)  aged 48 and older OR (3)  American aged 72 and older  3  Osteoporosis Screening: covered every 2 years if you meet one of the following conditions: (1) You're estrogen deficient and at risk for osteoporosis based off medical history and other findings; (2) Have a vertebral abnormality; (3) On glucocorticoid therapy for more than 3 months; (4) Have primary hyperparathyroidism; (5) On osteoporosis medications and need to assess response to drug therapy  · Last bone density test (DXA Scan): 03/29/2017  5  HIV Screening: covered annually if you're between the age of 12-76  Also covered annually if you are younger than 13 and older than 72 with risk factors for HIV infection   For pregnant patients, it is covered up to 3 times per pregnancy  Immunizations:  Immunization Recommendations   Influenza Vaccine Annual influenza vaccination during flu season is recommended for all persons aged >= 6 months who do not have contraindications   Pneumococcal Vaccine (Prevnar and Pneumovax)  * Prevnar = PCV13  * Pneumovax = PPSV23   Adults 25-60 years old: 1-3 doses may be recommended based on certain risk factors  Adults 72 years old: Prevnar (PCV13) vaccine recommended followed by Pneumovax (PPSV23) vaccine  If already received PPSV23 since turning 65, then PCV13 recommended at least one year after PPSV23 dose  Hepatitis B Vaccine 3 dose series if at intermediate or high risk (ex: diabetes, end stage renal disease, liver disease)   Tetanus (Td) Vaccine - COST NOT COVERED BY MEDICARE PART B Following completion of primary series, a booster dose should be given every 10 years to maintain immunity against tetanus  Td may also be given as tetanus wound prophylaxis  Tdap Vaccine - COST NOT COVERED BY MEDICARE PART B Recommended at least once for all adults  For pregnant patients, recommended with each pregnancy  Shingles Vaccine (Shingrix) - COST NOT COVERED BY MEDICARE PART B  2 shot series recommended in those aged 48 and above     Health Maintenance Due:      Topic Date Due    Breast Cancer Screening: Mammogram  12/16/2021     Immunizations Due:  There are no preventive care reminders to display for this patient  Advance Directives   What are advance directives? Advance directives are legal documents that state your wishes and plans for medical care  These plans are made ahead of time in case you lose your ability to make decisions for yourself  Advance directives can apply to any medical decision, such as the treatments you want, and if you want to donate organs  What are the types of advance directives? There are many types of advance directives, and each state has rules about how to use them   You may choose a combination of any of the following:  · Living will: This is a written record of the treatment you want  You can also choose which treatments you do not want, which to limit, and which to stop at a certain time  This includes surgery, medicine, IV fluid, and tube feedings  · Durable power of  for healthcare [facility]): This is a written record that states who you want to make healthcare choices for you when you are unable to make them for yourself  This person, called a proxy, is usually a family member or a friend  You may choose more than 1 proxy  · Do not resuscitate (DNR) order:  A DNR order is used in case your heart stops beating or you stop breathing  It is a request not to have certain forms of treatment, such as CPR  A DNR order may be included in other types of advance directives  · Medical directive: This covers the care that you want if you are in a coma, near death, or unable to make decisions for yourself  You can list the treatments you want for each condition  Treatment may include pain medicine, surgery, blood transfusions, dialysis, IV or tube feedings, and a ventilator (breathing machine)  · Values history: This document has questions about your views, beliefs, and how you feel and think about life  This information can help others choose the care that you would choose  Why are advance directives important? An advance directive helps you control your care  Although spoken wishes may be used, it is better to have your wishes written down  Spoken wishes can be misunderstood, or not followed  Treatments may be given even if you do not want them  An advance directive may make it easier for your family to make difficult choices about your care  Urinary Incontinence   Urinary incontinence (UI)  is when you lose control of your bladder  UI develops because your bladder cannot store or empty urine properly  The 3 most common types of UI are stress incontinence, urge incontinence, or both    Medicines: · May be given to help strengthen your bladder control  Report any side effects of medication to your healthcare provider  Do pelvic muscle exercises often:  Your pelvic muscles help you stop urinating  Squeeze these muscles tight for 5 seconds, then relax for 5 seconds  Gradually work up to squeezing for 10 seconds  Do 3 sets of 15 repetitions a day, or as directed  This will help strengthen your pelvic muscles and improve bladder control  Train your bladder:  Go to the bathroom at set times, such as every 2 hours, even if you do not feel the urge to go  You can also try to hold your urine when you feel the urge to go  For example, hold your urine for 5 minutes when you feel the urge to go  As that becomes easier, hold your urine for 10 minutes  Self-care:   · Keep a UI record  Write down how often you leak urine and how much you leak  Make a note of what you were doing when you leaked urine  · Drink liquids as directed  You may need to limit the amount of liquid you drink to help control your urine leakage  Do not drink any liquid right before you go to bed  Limit or do not have drinks that contain caffeine or alcohol  · Prevent constipation  Eat a variety of high-fiber foods  Good examples are high-fiber cereals, beans, vegetables, and whole-grain breads  Walking is the best way to trigger your intestines to have a bowel movement  · Exercise regularly and maintain a healthy weight  Weight loss and exercise will decrease pressure on your bladder and help you control your leakage  · Use a catheter as directed  to help empty your bladder  A catheter is a tiny, plastic tube that is put into your bladder to drain your urine  · Go to behavior therapy as directed  Behavior therapy may be used to help you learn to control your urge to urinate      Weight Management   Why it is important to manage your weight:  Being overweight increases your risk of health conditions such as heart disease, high blood pressure, type 2 diabetes, and certain types of cancer  It can also increase your risk for osteoarthritis, sleep apnea, and other respiratory problems  Aim for a slow, steady weight loss  Even a small amount of weight loss can lower your risk of health problems  How to lose weight safely:  A safe and healthy way to lose weight is to eat fewer calories and get regular exercise  You can lose up about 1 pound a week by decreasing the number of calories you eat by 500 calories each day  Healthy meal plan for weight management:  A healthy meal plan includes a variety of foods, contains fewer calories, and helps you stay healthy  A healthy meal plan includes the following:  · Eat whole-grain foods more often  A healthy meal plan should contain fiber  Fiber is the part of grains, fruits, and vegetables that is not broken down by your body  Whole-grain foods are healthy and provide extra fiber in your diet  Some examples of whole-grain foods are whole-wheat breads and pastas, oatmeal, brown rice, and bulgur  · Eat a variety of vegetables every day  Include dark, leafy greens such as spinach, kale, russell greens, and mustard greens  Eat yellow and orange vegetables such as carrots, sweet potatoes, and winter squash  · Eat a variety of fruits every day  Choose fresh or canned fruit (canned in its own juice or light syrup) instead of juice  Fruit juice has very little or no fiber  · Eat low-fat dairy foods  Drink fat-free (skim) milk or 1% milk  Eat fat-free yogurt and low-fat cottage cheese  Try low-fat cheeses such as mozzarella and other reduced-fat cheeses  · Choose meat and other protein foods that are low in fat  Choose beans or other legumes such as split peas or lentils  Choose fish, skinless poultry (chicken or turkey), or lean cuts of red meat (beef or pork)  Before you cook meat or poultry, cut off any visible fat  · Use less fat and oil  Try baking foods instead of frying them   Add less fat, such as margarine, sour cream, regular salad dressing and mayonnaise to foods  Eat fewer high-fat foods  Some examples of high-fat foods include french fries, doughnuts, ice cream, and cakes  · Eat fewer sweets  Limit foods and drinks that are high in sugar  This includes candy, cookies, regular soda, and sweetened drinks  Exercise:  Exercise at least 30 minutes per day on most days of the week  Some examples of exercise include walking, biking, dancing, and swimming  You can also fit in more physical activity by taking the stairs instead of the elevator or parking farther away from stores  Ask your healthcare provider about the best exercise plan for you  © Copyright [employer] 2018 Information is for End User's use only and may not be sold, redistributed or otherwise used for commercial purposes   All illustrations and images included in CareNotes® are the copyrighted property of [employer]  or [address] [place] [address]

## 2022-06-16 NOTE — PROGRESS NOTES
Assessment and Plan:     Problem List Items Addressed This Visit        Endocrine    Type 2 diabetes mellitus without complication, without long-term current use of insulin ([facility] [address] )    Relevant Orders    POCT hemoglobin A1c (Completed)       Musculoskeletal and Integument    Osteopenia    Relevant Orders    DXA bone density spine hip and pelvis      Other Visit Diagnoses     Encounter for screening mammogram for malignant neoplasm of breast    -  Primary    Relevant Orders    Mammo screening bilateral w 3d & cad    Welcome to Medicare preventive visit        Medicare annual wellness visit, subsequent               Preventive health issues were discussed with patient, and age appropriate screening tests were ordered as noted in patient's After Visit Summary  Personalized health advice and appropriate referrals for health education or preventive services given if needed, as noted in patient's After Visit Summary       History of Present Illness:     Patient presents for a Medicare Wellness Visit    HPI   Patient Care Team:  Tanesha Fletcher DO as PCP - General Gray Watkins DO     Review of Systems:     Review of Systems     Problem List:     Patient Active Problem List   Diagnosis    Bilateral leg edema    Hyperlipidemia    Hypertension    Osteopenia    Vitamin B12 deficiency    Vitamin D deficiency    Hypothyroidism due to Hashimoto's thyroiditis    Acute non-recurrent maxillary sinusitis    Type 2 diabetes mellitus without complication, without long-term current use of insulin (HCC)    Primary osteoarthritis of both knees    Cystitis    Laceration of left index finger without foreign body without damage to nail      Past Medical and Surgical History:     Past Medical History:   Diagnosis Date    Administration of long-term prophylactic antibiotics 04/29/2014    Need for Prophylactic Antibiotics     Past Surgical History:   Procedure Laterality Date    TOTAL ABDOMINAL HYSTERECTOMY W/ BILATERAL SALPINGOOPHORECTOMY      with Entercele Repair      Family History:     Family History   Problem Relation Age of Onset    No Known Problems Mother       Social History:     Social History     Socioeconomic History    Marital status: /Civil Union     Spouse name: None    Number of children: None    Years of education: None    Highest education level: None   Occupational History    Occupation:    Tobacco Use    Smoking status: Former Smoker    Smokeless tobacco: Never Used   Vaping Use    Vaping Use: Never used   Substance and Sexual Activity    Alcohol use: Yes     Comment: Social drinker    Drug use: No    Sexual activity: Not Currently   Other Topics Concern    None   Social History Narrative    None     Social Determinants of Health     Financial Resource Strain: Not on file   Food Insecurity: Not on file   Transportation Needs: Not on file   Physical Activity: Not on file   Stress: Not on file   Social Connections: Not on file   Intimate Partner Violence: Not on file   Housing Stability: Not on file      Medications and Allergies:     Current Outpatient Medications   Medication Sig Dispense Refill    aspirin 81 MG tablet Take 1 tablet by mouth daily      bimatoprost (LUMIGAN) 0 01 % ophthalmic drops Apply 1 drop to eye Daily      Brinzolamide-Brimonidine 1-0 2 % SUSP Apply to eye      CeleBREX 200 MG capsule TAKE ONE CAPSULE BY MOUTH DAILY WITH A MEAL 90 capsule 1    Coenzyme Q10 (COQ10) 100 MG CAPS Take 1 capsule by mouth daily      conjugated estrogens (Premarin) 0 3 mg tablet TAKE 1 TABLET DAILY , NONE ON WEEKENDS 66 tablet 2    Cranberry 450 MG TABS Take by mouth      Diovan -12 5 MG per tablet TAKE 1 TABLET BY MOUTH EVERY DAY 90 tablet 1    ezetimibe (ZETIA) 10 mg tablet Take 1 tablet (10 mg total) by mouth daily 90 tablet 3    Loratadine 10 MG CAPS Take by mouth      Multiple Vitamins-Minerals (CENTRUM SILVER ULTRA WOMENS PO) Take 1 tablet by mouth daily      Norvasc 5 MG tablet TAKE 1 TABLET BY MOUTH EVERY DAY 90 tablet 3    polyethylene glycol-propylene glycol (SYSTANE) 0 4-0 3 % Apply to eye      Probiotic Product (ALIGN PO) Take by mouth daily      simvastatin (ZOCOR) 20 mg tablet Take 1 tablet (20 mg total) by mouth daily at bedtime 90 tablet 3    Synthroid 25 MCG tablet TAKE 1 TABLET BY MOUTH EVERY DAY 90 tablet 1    Toprol XL 50 MG 24 hr tablet TAKE 1 TABLET BY MOUTH EVERY DAY 90 tablet 1    Vytorin 10-20 MG per tablet TAKE 1 TABLET BY MOUTH EVERYDAY AT BEDTIME 90 tablet 1    co-enzyme Q-10 30 MG capsule Take 30 mg by mouth daily (Patient not taking: Reported on 6/16/2022)      Olopatadine HCl 0 7 % SOLN Apply to eye (Patient not taking: No sig reported)      Omega-3 Fatty Acids (FISH OIL) 1,000 mg Take 1 capsule by mouth daily (Patient not taking: No sig reported)       No current facility-administered medications for this visit  Allergies   Allergen Reactions    Clindamycin      Reaction Date: [id number];     Other     Penicillins     Sulfa Antibiotics       Immunizations:     Immunization History   Administered Date(s) Administered    COVID-19 MODERNA VACC 0 5 ML IM 01/11/2021, 02/08/2021, 08/27/2021, 04/08/2022    INFLUENZA 09/23/2018, 09/12/2019, 09/01/2020    Influenza Split High Dose Preservative Free IM 10/11/2013, 09/29/2015, 09/13/2016, 10/12/2017    Influenza, high dose seasonal 0 7 mL 10/01/2021    Pneumococcal Conjugate 13-Valent 05/13/2021    Pneumococcal Conjugate PCV 7 07/02/2013    Pneumococcal Polysaccharide PPV23 12/11/2014, 12/13/2016    Td (adult), adsorbed 01/25/2013    Tdap 12/15/2019    Zoster Vaccine Recombinant 06/06/2018, 11/16/2018      Health Maintenance:         Topic Date Due    Breast Cancer Screening: Mammogram  12/16/2021     There are no preventive care reminders to display for this patient  Medicare Screening Tests and Risk Assessments:     Honey Herrera is here for her Subsequent Wellness visit  Health Risk Assessment:   Patient rates overall health as very good  Patient feels that their physical health rating is same  Patient is satisfied with their life  Eyesight was rated as slightly worse  Hearing was rated as same  Patient feels that their emotional and mental health rating is same  Patients states they are sometimes angry  Patient states they are sometimes unusually tired/fatigued  Pain experienced in the last 7 days has been some  Patient's pain rating has been 5/10  Patient states that she has experienced no weight loss or gain in last 6 months  Depression Screening:   PHQ-2 Score: 0      Fall Risk Screening: In the past year, patient has experienced: no history of falling in past year      Urinary Incontinence Screening:   Patient has leaked urine accidently in the last six months  Home Safety:  Patient has trouble with stairs inside or outside of their home  Patient has working smoke alarms and has working carbon monoxide detector  Home safety hazards include: none  Has chair left     Nutrition:   Current diet is Regular and Diabetic  Medications:   Patient is currently taking over-the-counter supplements  OTC medications include: see medication list  Patient is able to manage medications  Activities of Daily Living (ADLs)/Instrumental Activities of Daily Living (IADLs):   Walk and transfer into and out of bed and chair?: Yes  Dress and groom yourself?: Yes    Bathe or shower yourself?: Yes    Feed yourself? Yes  Do your laundry/housekeeping?: Yes  Manage your money, pay your bills and track your expenses?: Yes  Make your own meals?: Yes    Do your own shopping?: Yes    Advance Care Planning:   Living will: Yes    Durable POA for healthcare:  Yes    Advanced directive: Yes      Cognitive Screening:   Provider or family/friend/caregiver concerned regarding cognition?: No    PREVENTIVE SCREENINGS      Cardiovascular Screening:    General: Screening Not Indicated, History Lipid Disorder, Risks and Benefits Discussed and Screening Current      Diabetes Screening:     General: Screening Not Indicated, History Diabetes, Risks and Benefits Discussed and Screening Current      Colorectal Cancer Screening:     General: Screening Not Indicated and Risks and Benefits Discussed      Breast Cancer Screening:     General: Screening Current and Risks and Benefits Discussed      Cervical Cancer Screening:    General: Screening Not Indicated and Risks and Benefits Discussed      Osteoporosis Screening:    General: Risks and Benefits Discussed    Due for: DXA Appendicular      Abdominal Aortic Aneurysm (AAA) Screening:        General: Risks and Benefits Discussed and Screening Not Indicated      Lung Cancer Screening:     General: Screening Not Indicated and Risks and Benefits Discussed      Hepatitis C Screening:    General: Risks and Benefits Discussed    Other Counseling Topics:   Regular weightbearing exercise       No exam data present     Physical Exam:     /78 (BP Location: Right arm, Patient Position: Sitting, Cuff Size: Standard)   Pulse 79   Temp 98 2 °F (36 8 °C) (Temporal)   Ht 5' 1" (1 549 m)   Wt 68 2 kg (150 lb 6 4 oz)   LMP  (LMP Unknown)   SpO2 98%   BMI 28 42 kg/m²     Physical Exam     Diego Hernandez, DO

## 2022-06-29 ENCOUNTER — PROCEDURE VISIT (OUTPATIENT)
Dept: FAMILY MEDICINE CLINIC | Facility: CLINIC | Age: 87
End: 2022-06-29
Payer: MEDICARE

## 2022-06-29 VITALS
HEIGHT: 61 IN | BODY MASS INDEX: 28.32 KG/M2 | SYSTOLIC BLOOD PRESSURE: 128 MMHG | WEIGHT: 150 LBS | DIASTOLIC BLOOD PRESSURE: 70 MMHG

## 2022-06-29 DIAGNOSIS — D49.2 SKIN NEOPLASM: Primary | ICD-10-CM

## 2022-06-29 PROCEDURE — 88305 TISSUE EXAM BY PATHOLOGIST: CPT | Performed by: PATHOLOGY

## 2022-06-29 PROCEDURE — 11301 SHAVE SKIN LESION 0.6-1.0 CM: CPT | Performed by: FAMILY MEDICINE

## 2022-06-29 NOTE — PROGRESS NOTES
Assessment/Plan:  See procedure note  Patient will keep area clean and dry and use bacitracin daily  Diagnoses and all orders for this visit:    Skin neoplasm            Subjective:        Patient ID: Juana Marquis is a 82 y o  female  Patient is here for removal of neoplasm anterior chest wall  Patient notices change in shape in size  The following portions of the patient's history were reviewed and updated as appropriate: allergies, current medications, past family history, past medical history, past social history, past surgical history and problem list       Review of Systems   Skin: Positive for color change  Objective:               /70 (BP Location: Left arm, Patient Position: Sitting, Cuff Size: Standard)   Ht 5' 1" (1 549 m)   Wt 68 kg (150 lb)   LMP  (LMP Unknown)   BMI 28 34 kg/m²          Physical Exam  Skin:     Findings: Lesion present  Comments: 8 mm irregular lesion anterior chest wall left of midline         Shave lesion    Date/Time: 6/29/2022 2:55 PM  Performed by: Romeo Rothman DO  Authorized by: Romeo Rothman DO   Universal Protocol:  Consent: Verbal consent obtained  Written consent obtained  Risks and benefits: risks, benefits and alternatives were discussed  Consent given by: patient  Time out: Immediately prior to procedure a "time out" was called to verify the correct patient, procedure, equipment, support staff and site/side marked as required    Patient understanding: patient does not state understanding of the procedure being performed  Patient consent: the patient's understanding of the procedure does not match consent given  Procedure consent: procedure consent does not match procedure scheduled  Relevant documents: relevant documents not present or verified  Patient identity confirmed: verbally with patient      Number of Lesions: 1  Lesion 1:     Body area: trunk    Trunk location: chest    Initial size (mm): 8    Final defect size (mm): 8    Malignancy: malignancy unknown      Destruction method: shave removal      Comments:  Informed consent obtained  Betadine and alcohol use  1 5 cc of lidocaine with epinephrine used  Shave excision done  Electrodesiccation to the base  Specimen sent to pathology  Bacitracin and dry sterile dressing applied  Patient tolerated well

## 2022-07-14 ENCOUNTER — HOSPITAL ENCOUNTER (OUTPATIENT)
Dept: BONE DENSITY | Facility: MEDICAL CENTER | Age: 87
Discharge: HOME | End: 2022-07-14
Payer: MEDICARE

## 2022-07-14 DIAGNOSIS — M85.88 OSTEOPENIA OF OTHER SITE: ICD-10-CM

## 2022-07-14 PROCEDURE — 77080 DXA BONE DENSITY AXIAL: CPT

## 2022-07-15 ENCOUNTER — OFFICE VISIT (OUTPATIENT)
Dept: FAMILY MEDICINE CLINIC | Facility: CLINIC | Age: 87
End: 2022-07-15

## 2022-07-15 VITALS
HEART RATE: 77 BPM | DIASTOLIC BLOOD PRESSURE: 72 MMHG | BODY MASS INDEX: 28.7 KG/M2 | HEIGHT: 61 IN | OXYGEN SATURATION: 97 % | TEMPERATURE: 97.6 F | WEIGHT: 152 LBS | SYSTOLIC BLOOD PRESSURE: 132 MMHG

## 2022-07-15 DIAGNOSIS — L82.0 SEBORRHEIC KERATOSIS, INFLAMED: Primary | ICD-10-CM

## 2022-07-15 PROCEDURE — 99024 POSTOP FOLLOW-UP VISIT: CPT | Performed by: FAMILY MEDICINE

## 2022-07-15 NOTE — PROGRESS NOTES
Assessment/Plan:  Pathology report discussed with the patient  Area closed  Patient will use sunscreen as directed  Patient use cocoa butter or vitamin-E cream   Questions answered  Follow-up as needed       Diagnoses and all orders for this visit:    Seborrheic keratosis, inflamed            Subjective:        Patient ID: Jocelyn Desai is a 82 y o  female  Patient is here to follow-up on biopsy report  No redness pain or discharge  The following portions of the patient's history were reviewed and updated as appropriate: allergies, current medications, past family history, past medical history, past social history, past surgical history and problem list       Review of Systems        Objective:               /72   Pulse 77   Temp 97 6 °F (36 4 °C) (Tympanic)   Ht 5' 1" (1 549 m)   Wt 68 9 kg (152 lb)   LMP  (LMP Unknown)   SpO2 97%   BMI 28 72 kg/m²          Physical Exam  Skin:     Comments: Biopsy site anterior chest wall healed well without any redness discharge

## 2022-08-22 ENCOUNTER — OFFICE VISIT (OUTPATIENT)
Dept: FAMILY MEDICINE CLINIC | Facility: CLINIC | Age: 87
End: 2022-08-22
Payer: MEDICARE

## 2022-08-22 VITALS
TEMPERATURE: 98.7 F | DIASTOLIC BLOOD PRESSURE: 82 MMHG | SYSTOLIC BLOOD PRESSURE: 148 MMHG | OXYGEN SATURATION: 99 % | HEART RATE: 96 BPM

## 2022-08-22 DIAGNOSIS — J01.00 ACUTE NON-RECURRENT MAXILLARY SINUSITIS: Primary | ICD-10-CM

## 2022-08-22 PROCEDURE — 99213 OFFICE O/P EST LOW 20 MIN: CPT | Performed by: FAMILY MEDICINE

## 2022-08-22 RX ORDER — AZITHROMYCIN 250 MG/1
TABLET, FILM COATED ORAL
Qty: 6 TABLET | Refills: 0 | Status: SHIPPED | OUTPATIENT
Start: 2022-08-22 | End: 2022-08-26

## 2022-08-22 NOTE — PROGRESS NOTES
Assessment/Plan:  The patient may continue with saline nasal spray and use Z-Bobo as directed  Diagnoses and all orders for this visit:    Acute non-recurrent maxillary sinusitis  -     azithromycin (ZITHROMAX) 250 mg tablet; Take 2 tablets today then 1 tablet daily x 4 days            Subjective:        Patient ID: Luther Thomas is a 82 y o  female  Patient is here as a congestion, dizziness over the past 2 weeks  Patient with history of allergies  Patient with some maxillary sinus discomfort  Patient using saline nasal spray  Patient with loose bowel movements yesterday  Patient with some lightheadedness  Patient with nose bleed  The following portions of the patient's history were reviewed and updated as appropriate: allergies, current medications, past family history, past medical history, past social history, past surgical history and problem list       Review of Systems   Constitutional: Negative  Negative for chills and fever  HENT: Positive for congestion, ear pain, rhinorrhea, sinus pressure and sinus pain  Eyes: Negative  Respiratory: Positive for cough  Cardiovascular: Negative  Gastrointestinal: Negative  Endocrine: Negative  Genitourinary: Negative  Musculoskeletal: Negative  Skin: Negative  Allergic/Immunologic: Negative  Neurological: Negative  Hematological: Negative  Psychiatric/Behavioral: Negative  Objective:               /82 (BP Location: Right arm, Patient Position: Sitting, Cuff Size: Standard)   Pulse 96   Temp 98 7 °F (37 1 °C) (Temporal)   LMP  (LMP Unknown)   SpO2 99%          Physical Exam  Vitals and nursing note reviewed  Constitutional:       General: She is not in acute distress  Appearance: Normal appearance  She is not ill-appearing, toxic-appearing or diaphoretic  HENT:      Head: Normocephalic and atraumatic        Right Ear: Tympanic membrane, ear canal and external ear normal  There is no impacted cerumen  Left Ear: Tympanic membrane, ear canal and external ear normal  There is no impacted cerumen  Nose: Nose normal  No congestion or rhinorrhea  Mouth/Throat:      Mouth: Mucous membranes are moist       Pharynx: Oropharyngeal exudate present  No posterior oropharyngeal erythema  Eyes:      General: No scleral icterus  Right eye: No discharge  Left eye: No discharge  Extraocular Movements: Extraocular movements intact  Conjunctiva/sclera: Conjunctivae normal       Pupils: Pupils are equal, round, and reactive to light  Neck:      Vascular: No carotid bruit  Cardiovascular:      Rate and Rhythm: Normal rate and regular rhythm  Pulses: Normal pulses  Heart sounds: Normal heart sounds  No murmur heard  No friction rub  No gallop  Pulmonary:      Effort: Pulmonary effort is normal  No respiratory distress  Breath sounds: Normal breath sounds  No stridor  No wheezing, rhonchi or rales  Chest:      Chest wall: No tenderness  Musculoskeletal:         General: No swelling, tenderness, deformity or signs of injury  Normal range of motion  Cervical back: Normal range of motion and neck supple  No rigidity  No muscular tenderness  Right lower leg: No edema  Left lower leg: No edema  Lymphadenopathy:      Cervical: No cervical adenopathy  Skin:     General: Skin is warm and dry  Capillary Refill: Capillary refill takes less than 2 seconds  Coloration: Skin is not jaundiced  Findings: No bruising, erythema, lesion or rash  Neurological:      Mental Status: She is alert and oriented to person, place, and time  Mental status is at baseline  Cranial Nerves: No cranial nerve deficit  Sensory: No sensory deficit  Motor: No weakness        Coordination: Coordination normal       Gait: Gait normal    Psychiatric:         Mood and Affect: Mood normal          Behavior: Behavior normal          Thought Content:  Thought content normal          Judgment: Judgment normal

## 2022-09-20 DIAGNOSIS — E11.9 TYPE 2 DIABETES MELLITUS WITHOUT COMPLICATION, WITHOUT LONG-TERM CURRENT USE OF INSULIN (HCC): Primary | ICD-10-CM

## 2022-09-20 RX ORDER — CEPHALEXIN 500 MG/1
CAPSULE ORAL
Qty: 10 CAPSULE | Refills: 2 | Status: SHIPPED | OUTPATIENT
Start: 2022-09-20 | End: 2022-09-21

## 2022-10-12 PROBLEM — S61.211A LACERATION OF LEFT INDEX FINGER WITHOUT FOREIGN BODY WITHOUT DAMAGE TO NAIL: Status: RESOLVED | Noted: 2022-02-07 | Resolved: 2022-10-12

## 2022-10-31 ENCOUNTER — RA CDI HCC (OUTPATIENT)
Dept: OTHER | Facility: HOSPITAL | Age: 87
End: 2022-10-31

## 2022-11-04 ENCOUNTER — OFFICE VISIT (OUTPATIENT)
Dept: FAMILY MEDICINE CLINIC | Facility: CLINIC | Age: 87
End: 2022-11-04

## 2022-11-04 VITALS
HEART RATE: 80 BPM | HEIGHT: 61 IN | SYSTOLIC BLOOD PRESSURE: 128 MMHG | WEIGHT: 150.2 LBS | DIASTOLIC BLOOD PRESSURE: 82 MMHG | OXYGEN SATURATION: 98 % | TEMPERATURE: 98.4 F | BODY MASS INDEX: 28.36 KG/M2

## 2022-11-04 DIAGNOSIS — E78.2 MIXED HYPERLIPIDEMIA: ICD-10-CM

## 2022-11-04 DIAGNOSIS — I10 PRIMARY HYPERTENSION: ICD-10-CM

## 2022-11-04 DIAGNOSIS — E11.9 TYPE 2 DIABETES MELLITUS WITHOUT COMPLICATION, WITHOUT LONG-TERM CURRENT USE OF INSULIN (HCC): Primary | ICD-10-CM

## 2022-11-04 DIAGNOSIS — E06.3 HYPOTHYROIDISM DUE TO HASHIMOTO'S THYROIDITIS: ICD-10-CM

## 2022-11-04 DIAGNOSIS — E03.8 HYPOTHYROIDISM DUE TO HASHIMOTO'S THYROIDITIS: ICD-10-CM

## 2022-11-04 LAB — SL AMB POCT HEMOGLOBIN AIC: 6.8 (ref ?–6.5)

## 2022-11-04 NOTE — PROGRESS NOTES
Assessment/Plan:  A1c 6 9  CMP CBC lipid profile TSH reviewed  Patient continue current regimen for chronic conditions listed below  Guidance given overall  Refills will be given when needed for chronic conditions listed below  Flu shot was already given  Vaccines reviewed  Follow-up in 4 months year       Diagnoses and all orders for this visit:    Type 2 diabetes mellitus without complication, without long-term current use of insulin (HCC)  -     POCT hemoglobin A1c    Hypothyroidism due to Hashimoto's thyroiditis    Primary hypertension    Mixed hyperlipidemia            Subjective:        Patient ID: Sherman Watson is a 82 y o  female  The patient is here to follow-up on diabetes hypertension hyperlipidemia hypothyroidism  Patient with ongoing bilateral knee pain and swelling lower extremities left greater than right  This is chronic overall  Patient also with swelling over the palmar aspect of the left hand  This is status post making applesauce  No chest pain shortness breath problems urinating or defecating  The following portions of the patient's history were reviewed and updated as appropriate: allergies, current medications, past family history, past medical history, past social history, past surgical history and problem list       Review of Systems   Constitutional: Negative  HENT: Negative  Eyes: Negative  Respiratory: Negative  Cardiovascular: Positive for leg swelling  Gastrointestinal: Negative  Endocrine: Negative  Genitourinary: Negative  Musculoskeletal: Positive for arthralgias  Skin: Negative  Allergic/Immunologic: Negative  Neurological: Negative  Hematological: Negative  Psychiatric/Behavioral: Negative              Objective:               /82 (BP Location: Right arm, Patient Position: Sitting, Cuff Size: Standard)   Pulse 80   Temp 98 4 °F (36 9 °C) (Temporal)   Ht 5' 1" (1 549 m)   Wt 68 1 kg (150 lb 3 2 oz)   LMP  (LMP Unknown)   SpO2 98%   BMI 28 38 kg/m²          Physical Exam  Vitals and nursing note reviewed  Constitutional:       General: She is not in acute distress  Appearance: Normal appearance  She is not ill-appearing, toxic-appearing or diaphoretic  HENT:      Head: Normocephalic and atraumatic  Right Ear: Tympanic membrane, ear canal and external ear normal  There is no impacted cerumen  Left Ear: Tympanic membrane, ear canal and external ear normal  There is no impacted cerumen  Nose: Nose normal  No congestion or rhinorrhea  Mouth/Throat:      Mouth: Mucous membranes are moist       Pharynx: No oropharyngeal exudate or posterior oropharyngeal erythema  Eyes:      General: No scleral icterus  Right eye: No discharge  Left eye: No discharge  Extraocular Movements: Extraocular movements intact  Conjunctiva/sclera: Conjunctivae normal       Pupils: Pupils are equal, round, and reactive to light  Neck:      Vascular: No carotid bruit  Cardiovascular:      Rate and Rhythm: Normal rate and regular rhythm  Pulses: Normal pulses  Heart sounds: Normal heart sounds  No murmur heard  No friction rub  No gallop  Pulmonary:      Effort: Pulmonary effort is normal  No respiratory distress  Breath sounds: Normal breath sounds  No stridor  No wheezing, rhonchi or rales  Chest:      Chest wall: No tenderness  Musculoskeletal:         General: No swelling, tenderness, deformity or signs of injury  Normal range of motion  Cervical back: Normal range of motion and neck supple  No rigidity  No muscular tenderness  Right lower leg: Edema present  Left lower leg: Edema present  Lymphadenopathy:      Cervical: No cervical adenopathy  Skin:     General: Skin is warm and dry  Capillary Refill: Capillary refill takes less than 2 seconds  Coloration: Skin is not jaundiced  Findings: No bruising, erythema, lesion or rash  Neurological:      Mental Status: She is alert and oriented to person, place, and time  Cranial Nerves: No cranial nerve deficit  Sensory: No sensory deficit  Motor: No weakness  Coordination: Coordination normal       Gait: Gait normal    Psychiatric:         Mood and Affect: Mood normal          Behavior: Behavior normal          Thought Content:  Thought content normal          Judgment: Judgment normal

## 2022-11-12 DIAGNOSIS — I10 ESSENTIAL HYPERTENSION: ICD-10-CM

## 2022-11-13 RX ORDER — METOPROLOL SUCCINATE 50 MG
TABLET, EXTENDED RELEASE 24 HR ORAL
Qty: 90 TABLET | Refills: 1 | Status: SHIPPED | OUTPATIENT
Start: 2022-11-13

## 2022-12-02 ENCOUNTER — OFFICE VISIT (OUTPATIENT)
Dept: FAMILY MEDICINE CLINIC | Facility: CLINIC | Age: 87
End: 2022-12-02

## 2022-12-02 VITALS
RESPIRATION RATE: 18 BRPM | OXYGEN SATURATION: 99 % | DIASTOLIC BLOOD PRESSURE: 72 MMHG | SYSTOLIC BLOOD PRESSURE: 140 MMHG | WEIGHT: 150 LBS | BODY MASS INDEX: 28.32 KG/M2 | HEIGHT: 61 IN | TEMPERATURE: 97.6 F | HEART RATE: 78 BPM

## 2022-12-02 DIAGNOSIS — U07.1 COVID-19: ICD-10-CM

## 2022-12-02 DIAGNOSIS — J01.00 ACUTE NON-RECURRENT MAXILLARY SINUSITIS: ICD-10-CM

## 2022-12-02 DIAGNOSIS — E53.8 VITAMIN B12 DEFICIENCY: Primary | ICD-10-CM

## 2022-12-02 RX ORDER — CYANOCOBALAMIN 1000 UG/ML
1000 INJECTION, SOLUTION INTRAMUSCULAR; SUBCUTANEOUS
Status: SHIPPED | OUTPATIENT
Start: 2022-12-02

## 2022-12-02 RX ORDER — AZITHROMYCIN 250 MG/1
TABLET, FILM COATED ORAL
Qty: 6 TABLET | Refills: 0 | Status: SHIPPED | OUTPATIENT
Start: 2022-12-02 | End: 2022-12-06

## 2022-12-02 RX ADMIN — CYANOCOBALAMIN 1000 MCG: 1000 INJECTION, SOLUTION INTRAMUSCULAR; SUBCUTANEOUS at 13:42

## 2022-12-02 NOTE — PROGRESS NOTES
Assessment/Plan:  Supportive care recommended  Patient will have viral testing done at this time  Patient will start Z-Bobo as directed  Diagnoses and all orders for this visit:    Acute non-recurrent maxillary sinusitis  -     azithromycin (ZITHROMAX) 250 mg tablet; Take 2 tablets today then 1 tablet daily x 4 days            Subjective:        Patient ID: Elsa Garcia is a 82 y o  female  Patient is here with sinus issues over the past week  Patient with postnasal drip and congestion in her throat  No fever noted  Patient did use nasal gel and Claritin  Sinusitis  Associated symptoms include sinus pressure  The following portions of the patient's history were reviewed and updated as appropriate: allergies, current medications, past family history, past medical history, past social history, past surgical history and problem list       Review of Systems   Constitutional: Negative  Negative for fever  HENT: Positive for postnasal drip, rhinorrhea, sinus pressure and sinus pain  Eyes: Negative  Respiratory: Negative  Cardiovascular: Negative  Gastrointestinal: Negative  Endocrine: Negative  Genitourinary: Negative  Musculoskeletal: Negative  Skin: Negative  Allergic/Immunologic: Negative  Neurological: Negative  Hematological: Negative  Psychiatric/Behavioral: Negative  Objective:               /72 (BP Location: Right arm, Patient Position: Sitting, Cuff Size: Adult)   Pulse 78   Temp 97 6 °F (36 4 °C) (Tympanic)   Resp 18   Ht 5' 1" (1 549 m)   Wt 68 kg (150 lb)   LMP  (LMP Unknown)   SpO2 99%   BMI 28 34 kg/m²          Physical Exam  Vitals and nursing note reviewed  Constitutional:       General: She is not in acute distress  Appearance: Normal appearance  She is not ill-appearing, toxic-appearing or diaphoretic  HENT:      Head: Normocephalic and atraumatic        Right Ear: Tympanic membrane, ear canal and external ear normal  There is no impacted cerumen  Left Ear: Tympanic membrane, ear canal and external ear normal  There is no impacted cerumen  Nose: Rhinorrhea present  No congestion  Mouth/Throat:      Mouth: Mucous membranes are moist       Pharynx: Oropharyngeal exudate present  No posterior oropharyngeal erythema  Eyes:      General: No scleral icterus  Right eye: No discharge  Left eye: No discharge  Extraocular Movements: Extraocular movements intact  Conjunctiva/sclera: Conjunctivae normal       Pupils: Pupils are equal, round, and reactive to light  Neck:      Vascular: No carotid bruit  Cardiovascular:      Rate and Rhythm: Normal rate and regular rhythm  Pulses: Normal pulses  Heart sounds: Normal heart sounds  No murmur heard  No friction rub  No gallop  Pulmonary:      Effort: Pulmonary effort is normal  No respiratory distress  Breath sounds: Normal breath sounds  No stridor  No wheezing, rhonchi or rales  Chest:      Chest wall: No tenderness  Musculoskeletal:         General: No swelling, tenderness, deformity or signs of injury  Normal range of motion  Cervical back: Normal range of motion and neck supple  No rigidity  No muscular tenderness  Right lower leg: No edema  Left lower leg: No edema  Lymphadenopathy:      Cervical: No cervical adenopathy  Skin:     General: Skin is warm and dry  Capillary Refill: Capillary refill takes less than 2 seconds  Coloration: Skin is not jaundiced  Findings: No bruising, erythema, lesion or rash  Neurological:      Mental Status: She is alert and oriented to person, place, and time  Mental status is at baseline  Cranial Nerves: No cranial nerve deficit  Sensory: No sensory deficit  Motor: No weakness        Coordination: Coordination normal       Gait: Gait normal    Psychiatric:         Mood and Affect: Mood normal          Behavior: Behavior normal  Thought Content:  Thought content normal          Judgment: Judgment normal

## 2022-12-03 LAB
FLUAV RNA RESP QL NAA+PROBE: NEGATIVE
FLUBV RNA RESP QL NAA+PROBE: NEGATIVE
SARS-COV-2 RNA RESP QL NAA+PROBE: NEGATIVE

## 2022-12-13 DIAGNOSIS — M19.90 ARTHRITIS: ICD-10-CM

## 2022-12-13 DIAGNOSIS — I10 ESSENTIAL HYPERTENSION: ICD-10-CM

## 2022-12-13 DIAGNOSIS — E78.2 MIXED HYPERLIPIDEMIA: ICD-10-CM

## 2022-12-13 RX ORDER — VALSARTAN/HYDROCHLOROTHIAZIDE 160-12.5MG
TABLET ORAL
Qty: 90 TABLET | Refills: 1 | Status: SHIPPED | OUTPATIENT
Start: 2022-12-13

## 2022-12-13 RX ORDER — EZETIMIBE/SIMVASTATIN 10 MG-20MG
TABLET ORAL
Qty: 90 TABLET | Refills: 1 | Status: SHIPPED | OUTPATIENT
Start: 2022-12-13

## 2022-12-21 DIAGNOSIS — Z12.31 ENCOUNTER FOR SCREENING MAMMOGRAM FOR MALIGNANT NEOPLASM OF BREAST: ICD-10-CM

## 2023-01-22 DIAGNOSIS — R79.89 ABNORMAL TSH: ICD-10-CM

## 2023-01-22 DIAGNOSIS — I10 ESSENTIAL HYPERTENSION: ICD-10-CM

## 2023-01-23 ENCOUNTER — RA CDI HCC (OUTPATIENT)
Dept: OTHER | Facility: HOSPITAL | Age: 88
End: 2023-01-23

## 2023-01-23 RX ORDER — LEVOTHYROXINE SODIUM 25 MCG
TABLET ORAL
Qty: 90 TABLET | Refills: 1 | Status: SHIPPED | OUTPATIENT
Start: 2023-01-23

## 2023-01-23 RX ORDER — AMLODIPINE BESYLATE 5 MG
TABLET ORAL
Qty: 90 TABLET | Refills: 3 | Status: SHIPPED | OUTPATIENT
Start: 2023-01-23

## 2023-02-07 ENCOUNTER — OFFICE VISIT (OUTPATIENT)
Dept: FAMILY MEDICINE CLINIC | Facility: CLINIC | Age: 88
End: 2023-02-07

## 2023-02-07 VITALS
HEART RATE: 97 BPM | WEIGHT: 144.2 LBS | BODY MASS INDEX: 27.23 KG/M2 | TEMPERATURE: 97.7 F | HEIGHT: 61 IN | OXYGEN SATURATION: 99 % | SYSTOLIC BLOOD PRESSURE: 150 MMHG | DIASTOLIC BLOOD PRESSURE: 78 MMHG

## 2023-02-07 DIAGNOSIS — I10 PRIMARY HYPERTENSION: ICD-10-CM

## 2023-02-07 DIAGNOSIS — E78.2 MIXED HYPERLIPIDEMIA: ICD-10-CM

## 2023-02-07 DIAGNOSIS — E06.3 HYPOTHYROIDISM DUE TO HASHIMOTO'S THYROIDITIS: ICD-10-CM

## 2023-02-07 DIAGNOSIS — E03.8 HYPOTHYROIDISM DUE TO HASHIMOTO'S THYROIDITIS: ICD-10-CM

## 2023-02-07 DIAGNOSIS — E11.9 TYPE 2 DIABETES MELLITUS WITHOUT COMPLICATION, WITHOUT LONG-TERM CURRENT USE OF INSULIN (HCC): Primary | ICD-10-CM

## 2023-02-07 LAB — SL AMB POCT HEMOGLOBIN AIC: 7.1 (ref ?–6.5)

## 2023-02-07 RX ADMIN — CYANOCOBALAMIN 1000 MCG: 1000 INJECTION, SOLUTION INTRAMUSCULAR; SUBCUTANEOUS at 15:45

## 2023-02-07 NOTE — PROGRESS NOTES
Assessment/Plan: A1c 6 8 previously A1c 7 1 today  Patient will modify diet appropriately regarding diabetes LDL 41 TSH 3 0 CMP reviewed  CBC reviewed  Patient will continue with current regimen for diabetes hypothyroidism hypertension hyperlipidemia  Blood pressure will be rechecked at follow-up  Patient under increased stress at this time  Patient will follow-up in 4 months or as needed labs at that time  B12 shot given at this time  Diagnoses and all orders for this visit:    Type 2 diabetes mellitus without complication, without long-term current use of insulin (HCC)  -     POCT hemoglobin A1c    Hypothyroidism due to Hashimoto's thyroiditis    Primary hypertension    Mixed hyperlipidemia            Subjective:        Patient ID: Savana Pineda is a 82 y o  female  Patient here to follow-up on diabetes hypertension hyperlipidemia hypothyroidism  No new chest pain shortness of breath or headache or visual changes or problems urinating or defecating  The following portions of the patient's history were reviewed and updated as appropriate: allergies, current medications, past family history, past medical history, past social history, past surgical history and problem list       Review of Systems   Constitutional: Negative  HENT: Negative  Eyes: Negative  Respiratory: Negative  Cardiovascular: Negative  Gastrointestinal: Negative  Endocrine: Negative  Genitourinary: Negative  Musculoskeletal: Negative  Skin: Negative  Allergic/Immunologic: Negative  Neurological: Negative  Hematological: Negative  Psychiatric/Behavioral: Negative  Objective:      BMI Counseling: Body mass index is 27 25 kg/m²  The BMI is above normal  Nutrition recommendations include consuming healthier snacks  Exercise recommendations include exercising 3-5 times per week  Rationale for BMI follow-up plan is due to patient being overweight or obese       Depression Screening and Follow-up Plan: Patient was screened for depression during today's encounter  They screened negative with a PHQ-2 score of 0             /78 (BP Location: Right arm, Patient Position: Sitting, Cuff Size: Standard)   Pulse 97   Temp 97 7 °F (36 5 °C) (Temporal)   Ht 5' 1" (1 549 m)   Wt 65 4 kg (144 lb 3 2 oz)   LMP  (LMP Unknown)   SpO2 99%   BMI 27 25 kg/m²          Physical Exam  Vitals and nursing note reviewed  Constitutional:       General: She is not in acute distress  Appearance: Normal appearance  She is not ill-appearing, toxic-appearing or diaphoretic  HENT:      Head: Normocephalic and atraumatic  Right Ear: Tympanic membrane, ear canal and external ear normal  There is no impacted cerumen  Left Ear: Tympanic membrane, ear canal and external ear normal  There is no impacted cerumen  Nose: Nose normal  No congestion or rhinorrhea  Eyes:      General: No scleral icterus  Right eye: No discharge  Left eye: No discharge  Extraocular Movements: Extraocular movements intact  Conjunctiva/sclera: Conjunctivae normal       Pupils: Pupils are equal, round, and reactive to light  Neck:      Vascular: No carotid bruit  Cardiovascular:      Rate and Rhythm: Normal rate and regular rhythm  Pulses: Normal pulses  Heart sounds: Normal heart sounds  No murmur heard  No friction rub  No gallop  Pulmonary:      Effort: Pulmonary effort is normal  No respiratory distress  Breath sounds: Normal breath sounds  No stridor  No wheezing, rhonchi or rales  Chest:      Chest wall: No tenderness  Musculoskeletal:         General: No swelling, tenderness, deformity or signs of injury  Normal range of motion  Cervical back: Normal range of motion and neck supple  No rigidity  No muscular tenderness  Right lower leg: No edema  Left lower leg: No edema  Lymphadenopathy:      Cervical: No cervical adenopathy  Skin:     General: Skin is warm and dry  Capillary Refill: Capillary refill takes less than 2 seconds  Coloration: Skin is not jaundiced  Findings: No bruising, erythema, lesion or rash  Neurological:      Mental Status: She is alert and oriented to person, place, and time  Mental status is at baseline  Cranial Nerves: No cranial nerve deficit  Sensory: No sensory deficit  Motor: No weakness  Coordination: Coordination normal       Gait: Gait normal    Psychiatric:         Mood and Affect: Mood normal          Behavior: Behavior normal          Thought Content:  Thought content normal          Judgment: Judgment normal

## 2023-03-14 ENCOUNTER — TELEPHONE (OUTPATIENT)
Dept: FAMILY MEDICINE CLINIC | Facility: CLINIC | Age: 88
End: 2023-03-14

## 2023-03-14 DIAGNOSIS — F41.9 ANXIETY: Primary | ICD-10-CM

## 2023-03-14 RX ORDER — LORAZEPAM 0.5 MG/1
0.5 TABLET ORAL 2 TIMES DAILY PRN
Qty: 20 TABLET | Refills: 0 | Status: SHIPPED | OUTPATIENT
Start: 2023-03-14

## 2023-03-14 NOTE — TELEPHONE ENCOUNTER
Patient's son in law called requesting something be sent in for the patient due to her  decline in health  She's experiencing anxiety and is quite worked up  The family believes he is getting close to the end of life       [id number] [facility]

## 2023-05-11 DIAGNOSIS — I10 ESSENTIAL HYPERTENSION: ICD-10-CM

## 2023-05-11 RX ORDER — METOPROLOL SUCCINATE 50 MG
TABLET, EXTENDED RELEASE 24 HR ORAL
Qty: 90 TABLET | Refills: 1 | Status: SHIPPED | OUTPATIENT
Start: 2023-05-11

## 2023-05-31 DIAGNOSIS — R79.89 ABNORMAL TSH: ICD-10-CM

## 2023-06-01 RX ORDER — LEVOTHYROXINE SODIUM 25 MCG
TABLET ORAL
Qty: 90 TABLET | Refills: 1 | Status: SHIPPED | OUTPATIENT
Start: 2023-06-01

## 2023-06-07 ENCOUNTER — HOSPITAL ENCOUNTER (EMERGENCY)
Facility: HOSPITAL | Age: 88
Discharge: HOME | End: 2023-06-07
Attending: EMERGENCY MEDICINE
Payer: MEDICARE

## 2023-06-07 ENCOUNTER — APPOINTMENT (EMERGENCY)
Dept: CT IMAGING | Facility: HOSPITAL | Age: 88
End: 2023-06-07
Payer: MEDICARE

## 2023-06-07 VITALS
DIASTOLIC BLOOD PRESSURE: 69 MMHG | TEMPERATURE: 98 F | HEART RATE: 85 BPM | OXYGEN SATURATION: 97 % | RESPIRATION RATE: 15 BRPM | SYSTOLIC BLOOD PRESSURE: 145 MMHG

## 2023-06-07 DIAGNOSIS — N93.9 ABNORMAL VAGINAL BLEEDING: Primary | ICD-10-CM

## 2023-06-07 LAB
ANION GAP SERPL CALCULATED.3IONS-SCNC: 10 MMOL/L (ref 4–13)
BACTERIA UR QL AUTO: ABNORMAL /HPF
BASOPHILS # BLD AUTO: 0.04 THOUSANDS/ÂΜL (ref 0–0.1)
BASOPHILS NFR BLD AUTO: 0 % (ref 0–1)
BILIRUB UR QL STRIP: NEGATIVE
BUN SERPL-MCNC: 26 MG/DL (ref 5–25)
CALCIUM SERPL-MCNC: 9.6 MG/DL (ref 8.4–10.2)
CHLORIDE SERPL-SCNC: 97 MMOL/L (ref 96–108)
CLARITY UR: CLEAR
CO2 SERPL-SCNC: 27 MMOL/L (ref 21–32)
COLOR UR: YELLOW
CREAT SERPL-MCNC: 0.83 MG/DL (ref 0.6–1.3)
EOSINOPHIL # BLD AUTO: 0.02 THOUSAND/ÂΜL (ref 0–0.61)
EOSINOPHIL NFR BLD AUTO: 0 % (ref 0–6)
ERYTHROCYTE [DISTWIDTH] IN BLOOD BY AUTOMATED COUNT: 12.2 % (ref 11.6–15.1)
GFR SERPL CREATININE-BSD FRML MDRD: 64 ML/MIN/1.73SQ M
GLUCOSE SERPL-MCNC: 163 MG/DL (ref 65–140)
GLUCOSE UR STRIP-MCNC: NEGATIVE MG/DL
HCT VFR BLD AUTO: 42 % (ref 34.8–46.1)
HGB BLD-MCNC: 13.5 G/DL (ref 11.5–15.4)
HGB UR QL STRIP.AUTO: ABNORMAL
HYALINE CASTS #/AREA URNS LPF: ABNORMAL /LPF
IMM GRANULOCYTES # BLD AUTO: 0.06 THOUSAND/UL (ref 0–0.2)
IMM GRANULOCYTES NFR BLD AUTO: 1 % (ref 0–2)
KETONES UR STRIP-MCNC: NEGATIVE MG/DL
LEUKOCYTE ESTERASE UR QL STRIP: NEGATIVE
LYMPHOCYTES # BLD AUTO: 1.29 THOUSANDS/ÂΜL (ref 0.6–4.47)
LYMPHOCYTES NFR BLD AUTO: 14 % (ref 14–44)
MCH RBC QN AUTO: 30.1 PG (ref 26.8–34.3)
MCHC RBC AUTO-ENTMCNC: 32.1 G/DL (ref 31.4–37.4)
MCV RBC AUTO: 94 FL (ref 82–98)
MONOCYTES # BLD AUTO: 0.61 THOUSAND/ÂΜL (ref 0.17–1.22)
MONOCYTES NFR BLD AUTO: 7 % (ref 4–12)
NEUTROPHILS # BLD AUTO: 7.08 THOUSANDS/ÂΜL (ref 1.85–7.62)
NEUTS SEG NFR BLD AUTO: 78 % (ref 43–75)
NITRITE UR QL STRIP: NEGATIVE
NON-SQ EPI CELLS URNS QL MICRO: ABNORMAL /HPF
NRBC BLD AUTO-RTO: 0 /100 WBCS
PH UR STRIP.AUTO: 6 [PH] (ref 4.5–8)
PLATELET # BLD AUTO: 217 THOUSANDS/UL (ref 149–390)
PMV BLD AUTO: 10.4 FL (ref 8.9–12.7)
POTASSIUM SERPL-SCNC: 4 MMOL/L (ref 3.5–5.3)
PROT UR STRIP-MCNC: NEGATIVE MG/DL
RBC # BLD AUTO: 4.48 MILLION/UL (ref 3.81–5.12)
RBC #/AREA URNS AUTO: ABNORMAL /HPF
SODIUM SERPL-SCNC: 134 MMOL/L (ref 135–147)
SP GR UR STRIP.AUTO: 1.02 (ref 1–1.03)
UROBILINOGEN UR QL STRIP.AUTO: 0.2 E.U./DL
WBC # BLD AUTO: 9.1 THOUSAND/UL (ref 4.31–10.16)
WBC #/AREA URNS AUTO: ABNORMAL /HPF

## 2023-06-07 PROCEDURE — 85025 COMPLETE CBC W/AUTO DIFF WBC: CPT | Performed by: EMERGENCY MEDICINE

## 2023-06-07 PROCEDURE — 74177 CT ABD & PELVIS W/CONTRAST: CPT

## 2023-06-07 PROCEDURE — 36415 COLL VENOUS BLD VENIPUNCTURE: CPT | Performed by: EMERGENCY MEDICINE

## 2023-06-07 PROCEDURE — 80048 BASIC METABOLIC PNL TOTAL CA: CPT | Performed by: EMERGENCY MEDICINE

## 2023-06-07 PROCEDURE — G1004 CDSM NDSC: HCPCS

## 2023-06-07 PROCEDURE — 81001 URINALYSIS AUTO W/SCOPE: CPT

## 2023-06-07 RX ORDER — CLOTRIMAZOLE 1 %
1 CREAM WITH APPLICATOR VAGINAL
Qty: 45 G | Refills: 0 | Status: SHIPPED | OUTPATIENT
Start: 2023-06-07 | End: 2023-06-14

## 2023-06-07 RX ADMIN — IOHEXOL 100 ML: 350 INJECTION, SOLUTION INTRAVENOUS at 11:30

## 2023-06-07 NOTE — ED PROVIDER NOTES
History  Chief Complaint   Patient presents with   • Bleeding/Bruising     Pt reports standing up from toilet and noticing blood from vagina, concerned because shes already has a hysterectomy +40 years ago, also reporting back pain described as cramping     81 yo F presents for evaluation of sudden onset bleeding that she is localizing to her vaginal source  She used the bathroom this morning, urinated and bowel movement, neither of which were uncomfortable nor did she notice any blooding  Upon standing she had a gush of blood that she is very confident came from vagina, and persisted to the point that she is using a pad  She called PCP and was advised to come to the ED  She is noticing some suprapubic and bilateral radiating flank pain  She had total hysterectomy in the 1970s  History provided by:  Patient      Prior to Admission Medications   Prescriptions Last Dose Informant Patient Reported? Taking?    Brinzolamide-Brimonidine 1-0 2 % SUSP  Self Yes No   Sig: Apply to eye   CeleBREX 200 MG capsule   No No   Sig: TAKE ONE CAPSULE BY MOUTH DAILY WITH A MEAL   Coenzyme Q10 (COQ10) 100 MG CAPS  Self Yes No   Sig: Take 1 capsule by mouth daily   Cranberry 450 MG TABS  Self Yes No   Sig: Take by mouth   Diovan -12 5 MG per tablet   No No   Sig: TAKE 1 TABLET BY MOUTH EVERY DAY   LORazepam (ATIVAN) 0 5 mg tablet   No No   Sig: Take 1 tablet (0 5 mg total) by mouth 2 (two) times a day as needed for anxiety   Loratadine 10 MG CAPS  Self Yes No   Sig: Take by mouth   Multiple Vitamins-Minerals (CENTRUM SILVER ULTRA WOMENS PO)  Self Yes No   Sig: Take 1 tablet by mouth daily   Norvasc 5 MG tablet   No No   Sig: TAKE 1 TABLET BY MOUTH EVERY DAY   Probiotic Product (ALIGN PO)  Self Yes No   Sig: Take by mouth daily   Synthroid 25 MCG tablet   No No   Sig: TAKE 1 TABLET BY MOUTH EVERY DAY   Toprol XL 50 MG 24 hr tablet   No No   Sig: TAKE 1 TABLET BY MOUTH EVERY DAY   Vytorin 10-20 MG per tablet   No No Sig: TAKE 1 TABLET BY MOUTH EVERYDAY AT BEDTIME   aspirin 81 MG tablet  Self Yes No   Sig: Take 1 tablet by mouth daily   bimatoprost (LUMIGAN) 0 01 % ophthalmic drops  Self Yes No   Sig: Apply 1 drop to eye Daily   co-enzyme Q-10 30 MG capsule   Yes No   Sig: Take 30 mg by mouth daily   Patient not taking: No sig reported   conjugated estrogens (Premarin) 0 3 mg tablet   No No   Sig: TAKE 1 TABLET DAILY , NONE ON WEEKENDS   polyethylene glycol-propylene glycol (SYSTANE) 0 4-0 3 %  Self Yes No   Sig: Apply to eye      Facility-Administered Medications Last Administration Doses Remaining   cyanocobalamin injection 1,000 mcg 2/7/2023  3:45 PM           Past Medical History:   Diagnosis Date   • Administration of long-term prophylactic antibiotics 04/29/2014    Need for Prophylactic Antibiotics       Past Surgical History:   Procedure Laterality Date   • TOTAL ABDOMINAL HYSTERECTOMY W/ BILATERAL SALPINGOOPHORECTOMY      with Entercele Repair       Family History   Problem Relation Age of Onset   • No Known Problems Mother      I have reviewed and agree with the history as documented  E-Cigarette/Vaping   • E-Cigarette Use Never User      E-Cigarette/Vaping Substances   • Nicotine No    • THC No    • CBD No    • Flavoring No    • Other No    • Unknown No      Social History     Tobacco Use   • Smoking status: Former   • Smokeless tobacco: Never   Vaping Use   • Vaping Use: Never used   Substance Use Topics   • Alcohol use: Yes     Comment: Social drinker   • Drug use: No       Review of Systems    Physical Exam  Physical Exam  Vitals and nursing note reviewed  Exam conducted with a chaperone present Nikhil Sosa RN)  Genitourinary:     General: Normal vulva  Exam position: Supine  Vagina: No signs of injury  No vaginal discharge or bleeding  Rectum: Guaiac result negative  No mass, tenderness, anal fissure or external hemorrhoid        Comments: She had some spotting on the pad, and some evidence of some blood on out labia, but by speculum exam there is no bleeding source nor residual blood            Vital Signs  ED Triage Vitals   Temperature Pulse Respirations Blood Pressure SpO2   06/07/23 1009 06/07/23 1009 06/07/23 1009 06/07/23 1011 06/07/23 1009   98 °F (36 7 °C) 93 16 135/63 96 %      Temp Source Heart Rate Source Patient Position - Orthostatic VS BP Location FiO2 (%)   06/07/23 1009 06/07/23 1009 06/07/23 1011 06/07/23 1011 --   Oral Monitor Lying Right arm       Pain Score       06/07/23 1009       5           Vitals:    06/07/23 1009 06/07/23 1011 06/07/23 1216   BP:  135/63 145/69   Pulse: 93  85   Patient Position - Orthostatic VS:  Lying          Visual Acuity      ED Medications  Medications   iohexol (OMNIPAQUE) 350 MG/ML injection (SINGLE-DOSE) 100 mL (100 mL Intravenous Given 6/7/23 1130)       Diagnostic Studies  Results Reviewed     Procedure Component Value Units Date/Time    Urine Microscopic [[id number]]  (Abnormal) Collected: 06/07/23 1035    Lab Status: Final result Specimen: Urine, Clean Catch Updated: 06/07/23 1110     RBC, UA 1-2 /hpf      WBC, UA 1-2 /hpf      Epithelial Cells Occasional /hpf      Bacteria, UA None Seen /hpf      Hyaline Casts, UA 3-5 /lpf     Basic metabolic panel [[id number]]  (Abnormal) Collected: 06/07/23 1043    Lab Status: Final result Specimen: Blood from Arm, Left Updated: 06/07/23 1104     Sodium 134 mmol/L      Potassium 4 0 mmol/L      Chloride 97 mmol/L      CO2 27 mmol/L      ANION GAP 10 mmol/L      BUN 26 mg/dL      Creatinine 0 83 mg/dL      Glucose 163 mg/dL      Calcium 9 6 mg/dL      eGFR 64 ml/min/1 73sq m     Narrative:      Nilesh guidelines for Chronic Kidney Disease (CKD):   •  Stage 1 with normal or high GFR (GFR > 90 mL/min/1 73 square meters)  •  Stage 2 Mild CKD (GFR = 60-89 mL/min/1 73 square meters)  •  Stage 3A Moderate CKD (GFR = 45-59 mL/min/1 73 square meters)  •  Stage 3B Moderate CKD (GFR = 30-44 mL/min/1 73 square meters)  •  Stage 4 Severe CKD (GFR = 15-29 mL/min/1 73 square meters)  •  Stage 5 End Stage CKD (GFR <15 mL/min/1 73 square meters)  Note: GFR calculation is accurate only with a steady state creatinine    CBC and differential [[id number]]  (Abnormal) Collected: 06/07/23 1043    Lab Status: Final result Specimen: Blood from Arm, Left Updated: 06/07/23 1049     WBC 9 10 Thousand/uL      RBC 4 48 Million/uL      Hemoglobin 13 5 g/dL      Hematocrit 42 0 %      MCV 94 fL      MCH 30 1 pg      MCHC 32 1 g/dL      RDW 12 2 %      MPV 10 4 fL      Platelets 087 Thousands/uL      nRBC 0 /100 WBCs      Neutrophils Relative 78 %      Immat GRANS % 1 %      Lymphocytes Relative 14 %      Monocytes Relative 7 %      Eosinophils Relative 0 %      Basophils Relative 0 %      Neutrophils Absolute 7 08 Thousands/µL      Immature Grans Absolute 0 06 Thousand/uL      Lymphocytes Absolute 1 29 Thousands/µL      Monocytes Absolute 0 61 Thousand/µL      Eosinophils Absolute 0 02 Thousand/µL      Basophils Absolute 0 04 Thousands/µL     Urine Macroscopic, POC [[id number]]  (Abnormal) Collected: 06/07/23 1035    Lab Status: Final result Specimen: Urine Updated: 06/07/23 1037     Color, UA Yellow     Clarity, UA Clear     pH, UA 6 0     Leukocytes, UA Negative     Nitrite, UA Negative     Protein, UA Negative mg/dl      Glucose, UA Negative mg/dl      Ketones, UA Negative mg/dl      Urobilinogen, UA 0 2 E U /dl      Bilirubin, UA Negative     Occult Blood, UA Small     Specific [place], UA 1 020    Narrative:      CLINITEK RESULT                 CT abdomen pelvis with contrast   Final Result by Nyasia Hillman MD (06/07 1346)      No evidence of an acute inflammatory or neoplastic process in the abdomen or pelvis  Status post hysterectomy              Right      Workstation performed: [id number]                    Procedures  Procedures         ED Course  ED Course as of 06/07/23 1508   Wed Jun 07, 2023   1046 Blood, UA(!): Small  No UTI findings which is what I was testing for, I am attributing to contaminant of blood in the introitus   1100 Hemoglobin: 13 5  normal   1348 CT abdomen pelvis with contrast  Imaging reviewed and interpreted myself and concur with radiologist:   No mass or other abnormal findings for a source of occult bleeding   1402 Reviewed results with patient at bedside and updated on the plan  No source of bleeding by exam, nor any active bleeding seen  She is starting to have some burning pain and discharge at the introitus, which had been occurring for a few days, which she thought might be yeast infection  Her exam was not obvious, but I am will treat for yeast infection, and this possibly is a manifestion of vaginitis  She does not have uterus or cervix as a source post menopausal bleeding  SBIRT 20yo+    Flowsheet Row Most Recent Value   Initial Alcohol Screen: US AUDIT-C     1  How often do you have a drink containing alcohol? 1 Filed at: 06/07/2023 1004   2  How many drinks containing alcohol do you have on a typical day you are drinking? 0 Filed at: 06/07/2023 1004   3a  Male UNDER 65: How often do you have five or more drinks on one occasion? 0 Filed at: 06/07/2023 1004   3b  FEMALE Any Age, or MALE 65+: How often do you have 4 or more drinks on one occassion? 0 Filed at: 06/07/2023 1004   Audit-C Score 1 Filed at: 06/07/2023 1004   ZARIA: How many times in the past year have you    Used an illegal drug or used a prescription medication for non-medical reasons?  Never Filed at: 06/07/2023 1004                    MDM    Disposition  Final diagnoses:   Abnormal vaginal bleeding - possible vaginitis     Time reflects when diagnosis was documented in both MDM as applicable and the Disposition within this note     Time User Action Codes Description Comment    6/7/2023  1:48 PM Marti Haynes Add [N95 0] Post-menopausal bleeding     6/7/2023  1:48 PM Varghese Morales Remove [N95 0] Post-menopausal bleeding     6/7/2023  1:49 PM Varghese Morales Add [N93 9] Abnormal vaginal bleeding     6/7/2023  1:49 PM Varghese Morales Modify [N93 9] Abnormal vaginal bleeding possible vaginitis    6/7/2023  1:52 PM Varghese Morales Add [D53 9] Macrocytic anemia     6/7/2023  1:52 PM Varghese Morales Remove [D53 9] Macrocytic anemia       ED Disposition     ED Disposition   Discharge    Condition   Good    Date/Time   Wed Jun 7, 2023  1:48 PM    Comment   [place] discharge to home/self care  Follow-up Information     Follow up With Specialties Details Why Contact Erica Cardoza, DO Family Medicine Call  If symptoms worsen [place]  [phone]            Discharge Medication List as of 6/7/2023  1:52 PM      START taking these medications    Details   clotrimazole (GYNE-LOTRIMIN) 1 % vaginal cream Insert 1 applicator into the vagina daily at bedtime for 7 days, Starting Wed 6/7/2023, Until Wed 6/14/2023, Normal         CONTINUE these medications which have NOT CHANGED    Details   aspirin 81 MG tablet Take 1 tablet by mouth daily, Starting Thu 6/4/2015, Historical Med      bimatoprost (LUMIGAN) 0 01 % ophthalmic drops Apply 1 drop to eye Daily, Starting Mon 7/7/2014, Historical Med      Brinzolamide-Brimonidine 1-0 2 % SUSP Apply to eye, Starting Thu 6/4/2015, Historical Med      CeleBREX 200 MG capsule TAKE ONE CAPSULE BY MOUTH DAILY WITH A MEAL, Normal      !! co-enzyme Q-10 30 MG capsule Take 30 mg by mouth daily, Historical Med      !!  Coenzyme Q10 (COQ10) 100 MG CAPS Take 1 capsule by mouth daily, Starting Tue 1/8/2013, Historical Med      conjugated estrogens (Premarin) 0 3 mg tablet TAKE 1 TABLET DAILY , NONE ON WEEKENDS, Normal      Cranberry 450 MG TABS Take by mouth, Starting Tue 1/8/2013, Historical Med      Diovan -12 5 MG per tablet TAKE 1 TABLET BY MOUTH EVERY DAY, Normal      Loratadine 10 MG CAPS Take by mouth, Starting Fri 5/16/2014, Historical Med      LORazepam (ATIVAN) 0 5 mg tablet Take 1 tablet (0 5 mg total) by mouth 2 (two) times a day as needed for anxiety, Starting Tue 3/14/2023, Normal      Multiple Vitamins-Minerals (CENTRUM SILVER ULTRA WOMENS PO) Take 1 tablet by mouth daily, Starting Tue 1/8/2013, Historical Med      Norvasc 5 MG tablet TAKE 1 TABLET BY MOUTH EVERY DAY, Normal      polyethylene glycol-propylene glycol (SYSTANE) 0 4-0 3 % Apply to eye, Starting Tue 9/1/2015, Historical Med      Probiotic Product (ALIGN PO) Take by mouth daily, Historical Med      Synthroid 25 MCG tablet TAKE 1 TABLET BY MOUTH EVERY DAY, Normal      Toprol XL 50 MG 24 hr tablet TAKE 1 TABLET BY MOUTH EVERY DAY, Normal      Vytorin 10-20 MG per tablet TAKE 1 TABLET BY MOUTH EVERYDAY AT BEDTIME, Normal       !! - Potential duplicate medications found  Please discuss with provider  No discharge procedures on file      PDMP Review     None          ED Provider  Electronically Signed by           Rai Schulz MD  06/07/23 7142

## 2023-06-07 NOTE — DISCHARGE INSTRUCTIONS
No source of bleeding was found and there has not been any recurrence  I am treating you for yeast infection with vaginal cream for 7 days  Return if bleeding is heavy again  You should follow up for recheck if you see more bleeding

## 2023-06-07 NOTE — ED NOTES
Sitting up requesting crackers and peanut butter  Ambulated steadily to the bathroom prior, denies any urinary symptoms        Isaiah Weber RN  06/07/23 1200

## 2023-06-08 ENCOUNTER — OFFICE VISIT (OUTPATIENT)
Dept: FAMILY MEDICINE CLINIC | Facility: CLINIC | Age: 88
End: 2023-06-08
Payer: MEDICARE

## 2023-06-08 VITALS
OXYGEN SATURATION: 96 % | SYSTOLIC BLOOD PRESSURE: 146 MMHG | BODY MASS INDEX: 26.43 KG/M2 | HEART RATE: 102 BPM | HEIGHT: 61 IN | WEIGHT: 140 LBS | TEMPERATURE: 97.5 F | DIASTOLIC BLOOD PRESSURE: 76 MMHG

## 2023-06-08 DIAGNOSIS — E78.2 MIXED HYPERLIPIDEMIA: ICD-10-CM

## 2023-06-08 DIAGNOSIS — N93.9 VAGINAL BLEEDING: ICD-10-CM

## 2023-06-08 DIAGNOSIS — E06.3 HYPOTHYROIDISM DUE TO HASHIMOTO'S THYROIDITIS: ICD-10-CM

## 2023-06-08 DIAGNOSIS — I10 PRIMARY HYPERTENSION: ICD-10-CM

## 2023-06-08 DIAGNOSIS — E03.8 HYPOTHYROIDISM DUE TO HASHIMOTO'S THYROIDITIS: ICD-10-CM

## 2023-06-08 DIAGNOSIS — E11.9 TYPE 2 DIABETES MELLITUS WITHOUT COMPLICATION, WITHOUT LONG-TERM CURRENT USE OF INSULIN (HCC): Primary | ICD-10-CM

## 2023-06-08 LAB — SL AMB POCT HEMOGLOBIN AIC: 6.7 (ref ?–6.5)

## 2023-06-08 PROCEDURE — 99214 OFFICE O/P EST MOD 30 MIN: CPT | Performed by: FAMILY MEDICINE

## 2023-06-08 PROCEDURE — 83036 HEMOGLOBIN GLYCOSYLATED A1C: CPT | Performed by: FAMILY MEDICINE

## 2023-06-08 NOTE — PROGRESS NOTES
Assessment/Plan: Laboratory studies reviewed  A1c 6 7  Urinalysis BMP CBC reviewed  Patient will continue with current regimen for diabetes hypertension hypothyroidism hyperlipidemia  Medications will be refilled when needed in this regard  Patient will see gynecologist regarding vaginal bleeding  Diagnoses and all orders for this visit:    Type 2 diabetes mellitus without complication, without long-term current use of insulin (HCC)  -     POCT hemoglobin A1c    Hypothyroidism due to Hashimoto's thyroiditis    Primary hypertension    Mixed hyperlipidemia    Vaginal bleeding  -     Ambulatory Referral to Gynecology; Future            Subjective:        Patient ID: Toribio Aqunio is a 82 y o  female  Patient here to follow-up on diabetes hypertension hyperlipidemia  Patient with episode of vaginal bleeding yesterday  Patient was seen in the emergency room  ER records reviewed at this time  Patient did have CAT scan which was unremarkable  Patient had urinalysis done which showed trace blood  Patient's bleeding essentially stopped  Patient did have scant bleeding last night but no bleeding today  No significant change in bowel habits  No other chest pain shortness of breath or headache or visual disturbance  Patient did have some back pain and abdominal pain/pelvic pain which resolved  Patient with ongoing knee pain and lower extremity edema which is unchanged  The following portions of the patient's history were reviewed and updated as appropriate: allergies, current medications, past family history, past medical history, past social history, past surgical history and problem list       Review of Systems   Constitutional: Negative  HENT: Negative  Eyes: Negative  Respiratory: Negative  Cardiovascular: Positive for leg swelling  Gastrointestinal: Negative  Endocrine: Negative  Genitourinary: Positive for vaginal bleeding  Musculoskeletal: Positive for arthralgias  "  Skin: Negative  Allergic/Immunologic: Negative  Neurological: Negative  Hematological: Negative  Psychiatric/Behavioral: Negative  Objective:               /76 (BP Location: Right arm, Patient Position: Sitting, Cuff Size: Standard)   Pulse 102   Temp 97 5 °F (36 4 °C) (Temporal)   Ht 5' 1\" (1 549 m)   Wt 63 5 kg (140 lb)   LMP  (LMP Unknown)   SpO2 96%   BMI 26 45 kg/m²          Physical Exam  Vitals and nursing note reviewed  Constitutional:       General: She is not in acute distress  Appearance: Normal appearance  She is not ill-appearing, toxic-appearing or diaphoretic  HENT:      Head: Normocephalic and atraumatic  Right Ear: Tympanic membrane, ear canal and external ear normal  There is no impacted cerumen  Left Ear: Tympanic membrane, ear canal and external ear normal  There is no impacted cerumen  Nose: Nose normal  No congestion or rhinorrhea  Mouth/Throat:      Mouth: Mucous membranes are moist       Pharynx: No oropharyngeal exudate or posterior oropharyngeal erythema  Eyes:      General: No scleral icterus  Right eye: No discharge  Left eye: No discharge  Extraocular Movements: Extraocular movements intact  Conjunctiva/sclera: Conjunctivae normal       Pupils: Pupils are equal, round, and reactive to light  Neck:      Vascular: No carotid bruit  Cardiovascular:      Rate and Rhythm: Normal rate and regular rhythm  Pulses: Normal pulses  Heart sounds: Normal heart sounds  No murmur heard  No friction rub  No gallop  Pulmonary:      Effort: Pulmonary effort is normal  No respiratory distress  Breath sounds: Normal breath sounds  No stridor  No wheezing, rhonchi or rales  Chest:      Chest wall: No tenderness  Musculoskeletal:         General: No swelling, tenderness, deformity or signs of injury  Normal range of motion  Cervical back: Normal range of motion and neck supple   No " rigidity  No muscular tenderness  Right lower leg: No edema  Left lower leg: No edema  Lymphadenopathy:      Cervical: No cervical adenopathy  Skin:     General: Skin is warm and dry  Capillary Refill: Capillary refill takes less than 2 seconds  Coloration: Skin is not jaundiced  Findings: No bruising, erythema, lesion or rash  Neurological:      General: No focal deficit present  Mental Status: She is alert and oriented to person, place, and time  Cranial Nerves: No cranial nerve deficit  Sensory: No sensory deficit  Motor: No weakness  Coordination: Coordination normal       Gait: Gait normal    Psychiatric:         Mood and Affect: Mood normal          Behavior: Behavior normal          Thought Content:  Thought content normal          Judgment: Judgment normal

## 2023-08-07 DIAGNOSIS — E78.2 MIXED HYPERLIPIDEMIA: ICD-10-CM

## 2023-08-07 DIAGNOSIS — M19.90 ARTHRITIS: ICD-10-CM

## 2023-08-07 DIAGNOSIS — I10 ESSENTIAL HYPERTENSION: ICD-10-CM

## 2023-08-07 DIAGNOSIS — R23.2 HOT FLASHES: ICD-10-CM

## 2023-08-07 RX ORDER — EZETIMIBE/SIMVASTATIN 10 MG-20MG
TABLET ORAL
Qty: 90 TABLET | Refills: 1 | Status: SHIPPED | OUTPATIENT
Start: 2023-08-07

## 2023-08-07 RX ORDER — VALSARTAN AND HYDROCHLOROTHIAZIDE 160; 12.5 MG/1; MG/1
TABLET, FILM COATED ORAL
Qty: 90 TABLET | Refills: 1 | Status: SHIPPED | OUTPATIENT
Start: 2023-08-07

## 2023-08-24 ENCOUNTER — CLINICAL SUPPORT (OUTPATIENT)
Dept: FAMILY MEDICINE CLINIC | Facility: CLINIC | Age: 88
End: 2023-08-24
Payer: MEDICARE

## 2023-08-24 ENCOUNTER — OFFICE VISIT (OUTPATIENT)
Dept: OBGYN CLINIC | Facility: MEDICAL CENTER | Age: 88
End: 2023-08-24
Payer: MEDICARE

## 2023-08-24 VITALS
HEIGHT: 61 IN | DIASTOLIC BLOOD PRESSURE: 80 MMHG | BODY MASS INDEX: 27.02 KG/M2 | WEIGHT: 143.1 LBS | SYSTOLIC BLOOD PRESSURE: 138 MMHG

## 2023-08-24 DIAGNOSIS — N95.2 VAGINAL ATROPHY: Primary | ICD-10-CM

## 2023-08-24 DIAGNOSIS — E53.8 VITAMIN B12 DEFICIENCY: Primary | ICD-10-CM

## 2023-08-24 DIAGNOSIS — N93.9 VAGINAL BLEEDING: ICD-10-CM

## 2023-08-24 PROCEDURE — 99213 OFFICE O/P EST LOW 20 MIN: CPT | Performed by: STUDENT IN AN ORGANIZED HEALTH CARE EDUCATION/TRAINING PROGRAM

## 2023-08-24 PROCEDURE — 96372 THER/PROPH/DIAG INJ SC/IM: CPT

## 2023-08-24 RX ORDER — ESTRADIOL 0.1 MG/G
0.5 CREAM VAGINAL 2 TIMES WEEKLY
Qty: 42.5 G | Refills: 2 | Status: SHIPPED | OUTPATIENT
Start: 2023-08-24

## 2023-08-24 RX ORDER — CYANOCOBALAMIN 1000 UG/ML
1000 INJECTION, SOLUTION INTRAMUSCULAR; SUBCUTANEOUS
Status: SHIPPED | OUTPATIENT
Start: 2023-08-24

## 2023-08-24 RX ADMIN — CYANOCOBALAMIN 1000 MCG: 1000 INJECTION, SOLUTION INTRAMUSCULAR; SUBCUTANEOUS at 13:57

## 2023-08-24 NOTE — ASSESSMENT & PLAN NOTE
- her isolated episode of vaginal bleeding has resolved with no further episodes.   - She is s/p hysterectomy BSO  - Examination was unremarkable except for vaginal atrophy  - She would like to try vaginal estrace cream for vaginal dryness/irritation

## 2023-08-24 NOTE — PROGRESS NOTES
[facility] [address] [place] [address]    Assessment/Plan:  Kalli Whitman is a 82 y.o. post-hysterectomy female who presents a few months after an isolated episode of vaginal bleeding. She reports vaginal irritation and dryness. Vaginal bleeding  - her isolated episode of vaginal bleeding has resolved with no further episodes. - She is s/p hysterectomy BSO  - Examination was unremarkable except for vaginal atrophy  - She would like to try vaginal estrace cream for vaginal dryness/irritation    Diagnoses and all orders for this visit:    Vaginal atrophy  -     estradiol (ESTRACE VAGINAL) 0.1 mg/g vaginal cream; Insert 0.5 g into the vagina 2 (two) times a week    Vaginal bleeding  -     Ambulatory Referral to Gynecology          Subjective:   Kalli Whitman is a 82 y.o. No obstetric history on file. female. CC: vaginal bleeding    HPI: Vesta Mallory presents for an isolated episode of vaginal bleeding that occurred in June. She was seen in the ER and had a normal CT. She was treated for yeast.   She has had no further episodes. ROS: Review of Systems   Constitutional: Negative for chills and fever. Respiratory: Negative for cough and shortness of breath. Cardiovascular: Negative for chest pain and leg swelling. Gastrointestinal: Negative for abdominal pain, nausea and vomiting. Genitourinary: Negative for dysuria, frequency and urgency. Neurological: Negative for dizziness, light-headedness and headaches.        PFSH: The following portions of the patient's history were reviewed and updated as appropriate: allergies, current medications, past family history, past medical history, obstetric history, gynecologic history, past social history, past surgical history and problem list.       Objective:  /80   Ht 5' 1" (1.549 m)   Wt 64.9 kg (143 lb 1.6 oz)   LMP  (LMP Unknown)   BMI 27.04 kg/m²    Physical Exam  Constitutional:       Appearance: Normal appearance. HENT:      Head: Normocephalic and atraumatic. Cardiovascular:      Rate and Rhythm: Normal rate. Pulmonary:      Effort: Pulmonary effort is normal.   Abdominal:      General: There is no distension. Tenderness: There is no abdominal tenderness. There is no guarding. Genitourinary:     Exam position: Lithotomy position. Pubic Area: No rash. Labia:         Right: No rash, tenderness or lesion. Left: No rash, tenderness or lesion. Urethra: No prolapse, urethral swelling or urethral lesion. Vagina: No vaginal discharge, erythema, tenderness, bleeding or lesions. Comments: Cervix and uterus surgically absent  Lymphadenopathy:      Lower Body: No right inguinal adenopathy. No left inguinal adenopathy. Neurological:      Mental Status: She is alert.            Annalise Morris MD  [id number] [address] [facility]  8/24/2023 3:22 PM

## 2023-09-08 ENCOUNTER — OFFICE VISIT (OUTPATIENT)
Dept: FAMILY MEDICINE CLINIC | Facility: CLINIC | Age: 88
End: 2023-09-08
Payer: MEDICARE

## 2023-09-08 VITALS
OXYGEN SATURATION: 97 % | HEIGHT: 61 IN | TEMPERATURE: 97.8 F | DIASTOLIC BLOOD PRESSURE: 70 MMHG | WEIGHT: 144 LBS | SYSTOLIC BLOOD PRESSURE: 130 MMHG | BODY MASS INDEX: 27.19 KG/M2 | HEART RATE: 80 BPM

## 2023-09-08 DIAGNOSIS — Z00.00 MEDICARE ANNUAL WELLNESS VISIT, SUBSEQUENT: Primary | ICD-10-CM

## 2023-09-08 DIAGNOSIS — Z13.6 SCREENING FOR CARDIOVASCULAR CONDITION: ICD-10-CM

## 2023-09-08 DIAGNOSIS — E53.8 VITAMIN B12 DEFICIENCY: ICD-10-CM

## 2023-09-08 DIAGNOSIS — Z23 NEED FOR IMMUNIZATION AGAINST INFLUENZA: ICD-10-CM

## 2023-09-08 DIAGNOSIS — E11.9 TYPE 2 DIABETES MELLITUS WITHOUT COMPLICATION, WITHOUT LONG-TERM CURRENT USE OF INSULIN (HCC): ICD-10-CM

## 2023-09-08 DIAGNOSIS — Z11.59 NEED FOR HEPATITIS C SCREENING TEST: ICD-10-CM

## 2023-09-08 LAB
CREAT UR-MCNC: 59 MG/DL
MICROALBUMIN UR-MCNC: <7 MG/L
MICROALBUMIN/CREAT 24H UR: <12 MG/G CREATININE (ref 0–30)
SL AMB POCT HEMOGLOBIN AIC: 6.7 (ref ?–6.5)

## 2023-09-08 PROCEDURE — G0008 ADMIN INFLUENZA VIRUS VAC: HCPCS

## 2023-09-08 PROCEDURE — 90471 IMMUNIZATION ADMIN: CPT

## 2023-09-08 PROCEDURE — G0439 PPPS, SUBSEQ VISIT: HCPCS | Performed by: FAMILY MEDICINE

## 2023-09-08 PROCEDURE — 83036 HEMOGLOBIN GLYCOSYLATED A1C: CPT | Performed by: FAMILY MEDICINE

## 2023-09-08 PROCEDURE — 82570 ASSAY OF URINE CREATININE: CPT | Performed by: FAMILY MEDICINE

## 2023-09-08 PROCEDURE — 82043 UR ALBUMIN QUANTITATIVE: CPT | Performed by: FAMILY MEDICINE

## 2023-09-08 PROCEDURE — 90662 IIV NO PRSV INCREASED AG IM: CPT

## 2023-09-08 RX ORDER — CYANOCOBALAMIN 1000 UG/ML
1000 INJECTION, SOLUTION INTRAMUSCULAR; SUBCUTANEOUS
Status: SHIPPED | OUTPATIENT
Start: 2023-09-08

## 2023-09-08 RX ADMIN — CYANOCOBALAMIN 1000 MCG: 1000 INJECTION, SOLUTION INTRAMUSCULAR; SUBCUTANEOUS at 09:42

## 2023-09-08 NOTE — PROGRESS NOTES
Assessment and Plan:     Problem List Items Addressed This Visit        Endocrine    Type 2 diabetes mellitus without complication, without long-term current use of insulin (HCC)    Relevant Orders    POCT hemoglobin A1c (Completed)    Albumin / creatinine urine ratio    CBC and differential    Hemoglobin A1C    Comprehensive metabolic panel    Lipid panel    TSH, 3rd generation with Free T4 reflex   Other Visit Diagnoses     Medicare annual wellness visit, subsequent    -  Primary    Screening for cardiovascular condition        Relevant Orders    Lipid panel    Need for hepatitis C screening test        Relevant Orders    Hepatitis C antibody           Preventive health issues were discussed with patient, and age appropriate screening tests were ordered as noted in patient's After Visit Summary. Personalized health advice and appropriate referrals for health education or preventive services given if needed, as noted in patient's After Visit Summary.      History of Present Illness:     Patient presents for a Medicare Wellness Visit    HPI   Patient Care Team:  Veronica Rubi DO as PCP - Victor M Tinsley DO     Review of Systems:     Review of Systems     Problem List:     Patient Active Problem List   Diagnosis   • Bilateral leg edema   • Hyperlipidemia   • Hypertension   • Osteopenia   • Vitamin B12 deficiency   • Vitamin D deficiency   • Hypothyroidism due to Hashimoto's thyroiditis   • Acute non-recurrent maxillary sinusitis   • Type 2 diabetes mellitus without complication, without long-term current use of insulin (HCC)   • Primary osteoarthritis of both knees   • Cystitis   • Skin neoplasm   • Seborrheic keratosis, inflamed   • Vaginal bleeding      Past Medical and Surgical History:     Past Medical History:   Diagnosis Date   • Administration of long-term prophylactic antibiotics 04/29/2014    Need for Prophylactic Antibiotics     Past Surgical History:   Procedure Laterality Date   • TOTAL ABDOMINAL HYSTERECTOMY W/ BILATERAL SALPINGOOPHORECTOMY      with Entercele Repair      Family History:     Family History   Problem Relation Age of Onset   • No Known Problems Mother       Social History:     Social History     Socioeconomic History   • Marital status:      Spouse name: None   • Number of children: None   • Years of education: None   • Highest education level: None   Occupational History   • Occupation:    Tobacco Use   • Smoking status: Former   • Smokeless tobacco: Never   Vaping Use   • Vaping Use: Never used   Substance and Sexual Activity   • Alcohol use: Yes     Comment: Social drinker   • Drug use: No   • Sexual activity: Not Currently   Other Topics Concern   • None   Social History Narrative   • None     Social Determinants of Health     Financial Resource Strain: Not on file   Food Insecurity: Not on file   Transportation Needs: Not on file   Physical Activity: Not on file   Stress: Not on file   Social Connections: Not on file   Intimate Partner Violence: Not on file   Housing Stability: Not on file      Medications and Allergies:     Current Outpatient Medications   Medication Sig Dispense Refill   • aspirin 81 MG tablet Take 1 tablet by mouth daily     • bimatoprost (LUMIGAN) 0.01 % ophthalmic drops Apply 1 drop to eye Daily     • Brinzolamide-Brimonidine 1-0.2 % SUSP Apply to eye     • CeleBREX 200 MG capsule TAKE ONE CAPSULE BY MOUTH DAILY WITH A MEAL 90 capsule 1   • co-enzyme Q-10 30 MG capsule Take 30 mg by mouth daily     • conjugated estrogens (Premarin) 0.3 mg tablet TAKE 1 TABLET BY MOUTH DAILY.  NONE ON WEEKENDS 66 tablet 2   • Cranberry 450 MG TABS Take by mouth     • Diovan -12.5 MG per tablet TAKE 1 TABLET BY MOUTH EVERY DAY 90 tablet 1   • estradiol (ESTRACE VAGINAL) 0.1 mg/g vaginal cream Insert 0.5 g into the vagina 2 (two) times a week 42.5 g 2   • Loratadine 10 MG CAPS Take by mouth     • Multiple Vitamins-Minerals (CENTRUM SILVER ULTRA WOMENS PO) Take 1 tablet by mouth daily     • Norvasc 5 MG tablet TAKE 1 TABLET BY MOUTH EVERY DAY 90 tablet 3   • Probiotic Product (ALIGN PO) Take by mouth daily     • Synthroid 25 MCG tablet TAKE 1 TABLET BY MOUTH EVERY DAY 90 tablet 1   • Toprol XL 50 MG 24 hr tablet TAKE 1 TABLET BY MOUTH EVERY DAY 90 tablet 1   • Vytorin 10-20 MG per tablet TAKE 1 TABLET BY MOUTH EVERYDAY AT BEDTIME 90 tablet 1   • polyethylene glycol-propylene glycol (SYSTANE) 0.4-0.3 % Apply to eye       Current Facility-Administered Medications   Medication Dose Route Frequency Provider Last Rate Last Admin   • cyanocobalamin injection 1,000 mcg  1,000 mcg Intramuscular Q30 Days Michelle Sprawls, DO   1,000 mcg at 02/07/23 1545   • cyanocobalamin injection 1,000 mcg  1,000 mcg Intramuscular Q30 Days Michelle Sprawls, DO   1,000 mcg at 08/24/23 1357     Allergies   Allergen Reactions   • Clindamycin      Reaction Date: [id number];    • Other    • Penicillins    • Sulfa Antibiotics       Immunizations:     Immunization History   Administered Date(s) Administered   • COVID-19 MODERNA VACC 0.5 ML IM 01/11/2021, 02/08/2021, 08/27/2021, 04/08/2022   • COVID-19 Moderna Vac BIVALENT 12 Yr+ IM (BOOSTER ONLY) 0.5 ML 10/03/2022   • INFLUENZA 09/23/2018, 09/12/2019, 09/01/2020, 09/26/2022   • Influenza Split High Dose Preservative Free IM 10/11/2013, 09/29/2015, 09/13/2016, 10/12/2017   • Influenza, high dose seasonal 0.7 mL 10/01/2021   • Pneumococcal Conjugate 13-Valent 05/13/2021   • Pneumococcal Conjugate PCV 7 07/02/2013   • Pneumococcal Polysaccharide PPV23 12/11/2014, 12/13/2016   • Td (adult), adsorbed 01/25/2013   • Tdap 12/15/2019   • Zoster Vaccine Recombinant 06/06/2018, 11/16/2018      Health Maintenance:         Topic Date Due   • Breast Cancer Screening: Mammogram  12/19/2023         Topic Date Due   • COVID-19 Vaccine (6 - Moderna series) 02/03/2023   • Influenza Vaccine (1) 09/01/2023      Medicare Screening Tests and Risk Assessments:     Yesenia Zaman is here for her Subsequent Wellness visit. Last Medicare Wellness visit information reviewed, patient interviewed, no change since last AWV. Fall Risk Screening: In the past year, patient has experienced: no history of falling in past year      Urinary Incontinence Screening:   Patient has not leaked urine accidently in the last six months. Home Safety:  Patient has trouble with stairs inside or outside of their home. Patient has working smoke alarms Home safety hazards include: none. Medications:   Patient is currently taking over-the-counter supplements. OTC medications include: see medication list. Patient is able to manage medications. Activities of Daily Living (ADLs)/Instrumental Activities of Daily Living (IADLs):   Walk and transfer into and out of bed and chair?: Yes  Dress and groom yourself?: Yes    Bathe or shower yourself?: Yes    Feed yourself? Yes  Do your laundry/housekeeping?: Yes  Manage your money, pay your bills and track your expenses?: Yes  Make your own meals?: Yes    Do your own shopping?: Yes    Previous Hospitalizations:   Any hospitalizations or ED visits within the last 12 months?: No      Advance Care Planning:   Living will: Yes    Durable POA for healthcare:  Yes    Advanced directive: Yes      Cognitive Screening:   Provider or family/friend/caregiver concerned regarding cognition?: No    PREVENTIVE SCREENINGS      Cardiovascular Screening:    General: Screening Not Indicated, History Lipid Disorder and Risks and Benefits Discussed    Due for: Lipid Panel      Diabetes Screening:     General: Screening Not Indicated, History Diabetes and Risks and Benefits Discussed    Due for: Blood Glucose      Colorectal Cancer Screening:     General: Screening Not Indicated and Risks and Benefits Discussed      Breast Cancer Screening:     General: Screening Current and Risks and Benefits Discussed      Cervical Cancer Screening:    General: Screening Not Indicated and Risks and Benefits Discussed      Osteoporosis Screening:    General: Risks and Benefits Discussed      Abdominal Aortic Aneurysm (AAA) Screening:        General: Risks and Benefits Discussed and Screening Not Indicated      Lung Cancer Screening:     General: Screening Not Indicated and Risks and Benefits Discussed      Hepatitis C Screening:    General: Risks and Benefits Discussed    Hep C Screening Accepted: Yes      Other Counseling Topics:   Regular weightbearing exercise and calcium and vitamin D intake. No results found.      Physical Exam:     /70 (BP Location: Right arm, Patient Position: Sitting, Cuff Size: Standard)   Pulse 80   Temp 97.8 °F (36.6 °C) (Temporal)   Ht 5' 1" (1.549 m)   Wt 65.3 kg (144 lb)   LMP  (LMP Unknown)   SpO2 97%   BMI 27.21 kg/m²     Physical Exam     David Hayden DO

## 2023-09-08 NOTE — PATIENT INSTRUCTIONS
Medicare Preventive Visit Patient Instructions  Thank you for completing your Welcome to Medicare Visit or Medicare Annual Wellness Visit today. Your next wellness visit will be due in one year (9/8/2024). The screening/preventive services that you may require over the next 5-10 years are detailed below. Some tests may not apply to you based off risk factors and/or age. Screening tests ordered at today's visit but not completed yet may show as past due. Also, please note that scanned in results may not display below. Preventive Screenings:  Service Recommendations Previous Testing/Comments   Colorectal Cancer Screening  * Colonoscopy    * Fecal Occult Blood Test (FOBT)/Fecal Immunochemical Test (FIT)  * Fecal DNA/Cologuard Test  * Flexible Sigmoidoscopy Age: 43-73 years old   Colonoscopy: every 10 years (may be performed more frequently if at higher risk)  OR  FOBT/FIT: every 1 year  OR  Cologuard: every 3 years  OR  Sigmoidoscopy: every 5 years  Screening may be recommended earlier than age 39 if at higher risk for colorectal cancer. Also, an individualized decision between you and your healthcare provider will decide whether screening between the ages of 77-80 would be appropriate. Colonoscopy: Not on file  FOBT/FIT: Not on file  Cologuard: Not on file  Sigmoidoscopy: Not on file    Screening Not Indicated     Breast Cancer Screening Age: 36 years old  Frequency: every 1-2 years  Not required if history of left and right mastectomy Mammogram: 12/19/2022    Screening Current   Cervical Cancer Screening Between the ages of 21-29, pap smear recommended once every 3 years. Between the ages of 32-69, can perform pap smear with HPV co-testing every 5 years.    Recommendations may differ for women with a history of total hysterectomy, cervical cancer, or abnormal pap smears in past. Pap Smear: Not on file    Screening Not Indicated   Hepatitis C Screening Once for adults born between 1945 and 1965  More frequently in patients at high risk for Hepatitis C Hep C Antibody: Not on file        Diabetes Screening 1-2 times per year if you're at risk for diabetes or have pre-diabetes Fasting glucose: 146 mg/dL (1/25/2022)  A1C: 6.7 (9/8/2023)  Screening Not Indicated  History Diabetes   Cholesterol Screening Once every 5 years if you don't have a lipid disorder. May order more often based on risk factors. Lipid panel: 01/25/2022    Screening Not Indicated  History Lipid Disorder     Other Preventive Screenings Covered by Medicare:  1. Abdominal Aortic Aneurysm (AAA) Screening: covered once if your at risk. You're considered to be at risk if you have a family history of AAA. 2. Lung Cancer Screening: covers low dose CT scan once per year if you meet all of the following conditions: (1) Age 48-67; (2) No signs or symptoms of lung cancer; (3) Current smoker or have quit smoking within the last 15 years; (4) You have a tobacco smoking history of at least 20 pack years (packs per day multiplied by number of years you smoked); (5) You get a written order from a healthcare provider. 3. Glaucoma Screening: covered annually if you're considered high risk: (1) You have diabetes OR (2) Family history of glaucoma OR (3)  aged 48 and older OR (3)  American aged 72 and older  3. Osteoporosis Screening: covered every 2 years if you meet one of the following conditions: (1) You're estrogen deficient and at risk for osteoporosis based off medical history and other findings; (2) Have a vertebral abnormality; (3) On glucocorticoid therapy for more than 3 months; (4) Have primary hyperparathyroidism; (5) On osteoporosis medications and need to assess response to drug therapy. · Last bone density test (DXA Scan): 07/20/2022.  5. HIV Screening: covered annually if you're between the age of 15-65. Also covered annually if you are younger than 13 and older than 72 with risk factors for HIV infection.  For pregnant patients, it is covered up to 3 times per pregnancy. Immunizations:  Immunization Recommendations   Influenza Vaccine Annual influenza vaccination during flu season is recommended for all persons aged >= 6 months who do not have contraindications   Pneumococcal Vaccine   * Pneumococcal conjugate vaccine = PCV13 (Prevnar 13), PCV15 (Vaxneuvance), PCV20 (Prevnar 20)  * Pneumococcal polysaccharide vaccine = PPSV23 (Pneumovax) Adults 20-63 years old: 1-3 doses may be recommended based on certain risk factors  Adults 72 years old: 1-2 doses may be recommended based off what pneumonia vaccine you previously received   Hepatitis B Vaccine 3 dose series if at intermediate or high risk (ex: diabetes, end stage renal disease, liver disease)   Tetanus (Td) Vaccine - COST NOT COVERED BY MEDICARE PART B Following completion of primary series, a booster dose should be given every 10 years to maintain immunity against tetanus. Td may also be given as tetanus wound prophylaxis. Tdap Vaccine - COST NOT COVERED BY MEDICARE PART B Recommended at least once for all adults. For pregnant patients, recommended with each pregnancy. Shingles Vaccine (Shingrix) - COST NOT COVERED BY MEDICARE PART B  2 shot series recommended in those aged 48 and above     Health Maintenance Due:      Topic Date Due   • Breast Cancer Screening: Mammogram  12/19/2023     Immunizations Due:      Topic Date Due   • COVID-19 Vaccine (6 - Moderna series) 02/03/2023   • Influenza Vaccine (1) 09/01/2023     Advance Directives   What are advance directives? Advance directives are legal documents that state your wishes and plans for medical care. These plans are made ahead of time in case you lose your ability to make decisions for yourself. Advance directives can apply to any medical decision, such as the treatments you want, and if you want to donate organs. What are the types of advance directives?   There are many types of advance directives, and each state has rules about how to use them. You may choose a combination of any of the following:  · Living will: This is a written record of the treatment you want. You can also choose which treatments you do not want, which to limit, and which to stop at a certain time. This includes surgery, medicine, IV fluid, and tube feedings. · Durable power of  for [facility]): This is a written record that states who you want to make healthcare choices for you when you are unable to make them for yourself. This person, called a proxy, is usually a family member or a friend. You may choose more than 1 proxy. · Do not resuscitate (DNR) order:  A DNR order is used in case your heart stops beating or you stop breathing. It is a request not to have certain forms of treatment, such as CPR. A DNR order may be included in other types of advance directives. · Medical directive: This covers the care that you want if you are in a coma, near death, or unable to make decisions for yourself. You can list the treatments you want for each condition. Treatment may include pain medicine, surgery, blood transfusions, dialysis, IV or tube feedings, and a ventilator (breathing machine). · Values history: This document has questions about your views, beliefs, and how you feel and think about life. This information can help others choose the care that you would choose. Why are advance directives important? An advance directive helps you control your care. Although spoken wishes may be used, it is better to have your wishes written down. Spoken wishes can be misunderstood, or not followed. Treatments may be given even if you do not want them. An advance directive may make it easier for your family to make difficult choices about your care.    Weight Management   Why it is important to manage your weight:  Being overweight increases your risk of health conditions such as heart disease, high blood pressure, type 2 diabetes, and certain types of cancer. It can also increase your risk for osteoarthritis, sleep apnea, and other respiratory problems. Aim for a slow, steady weight loss. Even a small amount of weight loss can lower your risk of health problems. How to lose weight safely:  A safe and healthy way to lose weight is to eat fewer calories and get regular exercise. You can lose up about 1 pound a week by decreasing the number of calories you eat by 500 calories each day. Healthy meal plan for weight management:  A healthy meal plan includes a variety of foods, contains fewer calories, and helps you stay healthy. A healthy meal plan includes the following:  · Eat whole-grain foods more often. A healthy meal plan should contain fiber. Fiber is the part of grains, fruits, and vegetables that is not broken down by your body. Whole-grain foods are healthy and provide extra fiber in your diet. Some examples of whole-grain foods are whole-wheat breads and pastas, oatmeal, brown rice, and bulgur. · Eat a variety of vegetables every day. Include dark, leafy greens such as spinach, kale, russell greens, and mustard greens. Eat yellow and orange vegetables such as carrots, sweet potatoes, and winter squash. · Eat a variety of fruits every day. Choose fresh or canned fruit (canned in its own juice or light syrup) instead of juice. Fruit juice has very little or no fiber. · Eat low-fat dairy foods. Drink fat-free (skim) milk or 1% milk. Eat fat-free yogurt and low-fat cottage cheese. Try low-fat cheeses such as mozzarella and other reduced-fat cheeses. · Choose meat and other protein foods that are low in fat. Choose beans or other legumes such as split peas or lentils. Choose fish, skinless poultry (chicken or turkey), or lean cuts of red meat (beef or pork). Before you cook meat or poultry, cut off any visible fat. · Use less fat and oil. Try baking foods instead of frying them.  Add less fat, such as margarine, sour cream, regular salad dressing and mayonnaise to foods. Eat fewer high-fat foods. Some examples of high-fat foods include french fries, doughnuts, ice cream, and cakes. · Eat fewer sweets. Limit foods and drinks that are high in sugar. This includes candy, cookies, regular soda, and sweetened drinks. Exercise:  Exercise at least 30 minutes per day on most days of the week. Some examples of exercise include walking, biking, dancing, and swimming. You can also fit in more physical activity by taking the stairs instead of the elevator or parking farther away from stores. Ask your healthcare provider about the best exercise plan for you. © Copyright [employer] 2018 Information is for End User's use only and may not be sold, redistributed or otherwise used for commercial purposes.  All illustrations and images included in CareNotes® are the copyrighted property of [employer] or [address] [place] [address]

## 2023-12-07 DIAGNOSIS — M19.90 ARTHRITIS: ICD-10-CM

## 2023-12-07 DIAGNOSIS — I10 ESSENTIAL HYPERTENSION: ICD-10-CM

## 2023-12-07 DIAGNOSIS — E78.2 MIXED HYPERLIPIDEMIA: ICD-10-CM

## 2023-12-07 DIAGNOSIS — R79.89 ABNORMAL TSH: ICD-10-CM

## 2023-12-07 RX ORDER — EZETIMIBE/SIMVASTATIN 10 MG-20MG
TABLET ORAL
Qty: 90 TABLET | Refills: 1 | Status: SHIPPED | OUTPATIENT
Start: 2023-12-07

## 2023-12-07 RX ORDER — METOPROLOL SUCCINATE 50 MG
TABLET, EXTENDED RELEASE 24 HR ORAL
Qty: 90 TABLET | Refills: 1 | Status: SHIPPED | OUTPATIENT
Start: 2023-12-07

## 2023-12-07 RX ORDER — LEVOTHYROXINE SODIUM 25 MCG
TABLET ORAL
Qty: 90 TABLET | Refills: 1 | Status: SHIPPED | OUTPATIENT
Start: 2023-12-07

## 2023-12-14 ENCOUNTER — OFFICE VISIT (OUTPATIENT)
Dept: FAMILY MEDICINE CLINIC | Facility: CLINIC | Age: 88
End: 2023-12-14
Payer: MEDICARE

## 2023-12-14 VITALS
WEIGHT: 142.6 LBS | DIASTOLIC BLOOD PRESSURE: 64 MMHG | OXYGEN SATURATION: 95 % | TEMPERATURE: 98.6 F | SYSTOLIC BLOOD PRESSURE: 106 MMHG | HEART RATE: 70 BPM | HEIGHT: 61 IN | BODY MASS INDEX: 26.92 KG/M2

## 2023-12-14 DIAGNOSIS — J01.00 ACUTE NON-RECURRENT MAXILLARY SINUSITIS: Primary | ICD-10-CM

## 2023-12-14 PROCEDURE — 99214 OFFICE O/P EST MOD 30 MIN: CPT | Performed by: FAMILY MEDICINE

## 2023-12-14 RX ORDER — AZITHROMYCIN 250 MG/1
TABLET, FILM COATED ORAL
Qty: 6 TABLET | Refills: 0 | Status: SHIPPED | OUTPATIENT
Start: 2023-12-14 | End: 2023-12-18

## 2023-12-14 NOTE — PROGRESS NOTES
Assessment/Plan: Supportive care recommended. Patient may continue with nasal rinse. Diagnoses and all orders for this visit:    Acute non-recurrent maxillary sinusitis  -     azithromycin (ZITHROMAX) 250 mg tablet; Take 2 tablets today then 1 tablet daily x 4 days            Subjective:        Patient ID: Levon Coombs is a 82 y.o. female. Patient is here with cough, right ear pain over the past 4 days. Patient with postnasal drip rhinorrhea sinus congestion. Patient does use Claritin routinely. No fever. No vomiting or diarrhea associated with this. No significant body aches or fatigue          The following portions of the patient's history were reviewed and updated as appropriate: allergies, current medications, past family history, past medical history, past social history, past surgical history and problem list.      Review of Systems   Constitutional: Negative. Negative for fever. HENT:  Positive for congestion, postnasal drip, rhinorrhea, sinus pressure, sinus pain and sore throat. Eyes: Negative. Respiratory:  Positive for cough. Cardiovascular: Negative. Gastrointestinal: Negative. Endocrine: Negative. Genitourinary: Negative. Musculoskeletal: Negative. Skin: Negative. Allergic/Immunologic: Negative. Neurological: Negative. Hematological: Negative. Psychiatric/Behavioral: Negative. Objective:               /64 (BP Location: Right arm, Patient Position: Sitting, Cuff Size: Standard)   Pulse 70   Temp 98.6 °F (37 °C) (Temporal)   Ht 5' 1" (1.549 m)   Wt 64.7 kg (142 lb 9.6 oz)   LMP  (LMP Unknown)   SpO2 95%   BMI 26.94 kg/m²          Physical Exam  Vitals and nursing note reviewed. Constitutional:       General: She is not in acute distress. Appearance: Normal appearance. She is not ill-appearing, toxic-appearing or diaphoretic. HENT:      Head: Normocephalic and atraumatic.       Right Ear: Tympanic membrane, ear canal and external ear normal. There is no impacted cerumen. Left Ear: Tympanic membrane, ear canal and external ear normal. There is no impacted cerumen. Nose: Rhinorrhea present. No congestion. Mouth/Throat:      Mouth: Mucous membranes are moist.      Pharynx: Oropharyngeal exudate present. No posterior oropharyngeal erythema. Eyes:      General: No scleral icterus. Right eye: No discharge. Left eye: No discharge. Neck:      Vascular: No carotid bruit. Cardiovascular:      Rate and Rhythm: Normal rate and regular rhythm. Pulses: Normal pulses. Heart sounds: Normal heart sounds. No murmur heard. No friction rub. No gallop. Pulmonary:      Effort: Pulmonary effort is normal. No respiratory distress. Breath sounds: Normal breath sounds. No stridor. No wheezing, rhonchi or rales. Chest:      Chest wall: No tenderness. Musculoskeletal:         General: No swelling, tenderness, deformity or signs of injury. Normal range of motion. Cervical back: Normal range of motion and neck supple. No rigidity. No muscular tenderness. Right lower leg: No edema. Left lower leg: No edema. Lymphadenopathy:      Cervical: No cervical adenopathy. Skin:     General: Skin is warm and dry. Capillary Refill: Capillary refill takes less than 2 seconds. Coloration: Skin is not jaundiced. Findings: No bruising, erythema, lesion or rash. Neurological:      Mental Status: She is alert and oriented to person, place, and time. Mental status is at baseline. Cranial Nerves: No cranial nerve deficit. Sensory: No sensory deficit. Motor: No weakness. Coordination: Coordination normal.      Gait: Gait normal.   Psychiatric:         Mood and Affect: Mood normal.         Behavior: Behavior normal.         Thought Content:  Thought content normal.         Judgment: Judgment normal.

## 2023-12-18 ENCOUNTER — TELEPHONE (OUTPATIENT)
Dept: FAMILY MEDICINE CLINIC | Facility: CLINIC | Age: 88
End: 2023-12-18

## 2023-12-18 ENCOUNTER — OFFICE VISIT (OUTPATIENT)
Dept: FAMILY MEDICINE CLINIC | Facility: CLINIC | Age: 88
End: 2023-12-18
Payer: MEDICARE

## 2023-12-18 VITALS
BODY MASS INDEX: 26.62 KG/M2 | SYSTOLIC BLOOD PRESSURE: 110 MMHG | OXYGEN SATURATION: 98 % | DIASTOLIC BLOOD PRESSURE: 70 MMHG | HEIGHT: 61 IN | WEIGHT: 141 LBS | HEART RATE: 67 BPM | TEMPERATURE: 96.7 F

## 2023-12-18 DIAGNOSIS — H60.333 ACUTE SWIMMER'S EAR OF BOTH SIDES: Primary | ICD-10-CM

## 2023-12-18 DIAGNOSIS — E53.8 VITAMIN B12 DEFICIENCY: ICD-10-CM

## 2023-12-18 PROCEDURE — 96372 THER/PROPH/DIAG INJ SC/IM: CPT | Performed by: FAMILY MEDICINE

## 2023-12-18 PROCEDURE — 99214 OFFICE O/P EST MOD 30 MIN: CPT | Performed by: FAMILY MEDICINE

## 2023-12-18 RX ORDER — CYANOCOBALAMIN 1000 UG/ML
1000 INJECTION, SOLUTION INTRAMUSCULAR; SUBCUTANEOUS
Status: SHIPPED | OUTPATIENT
Start: 2023-12-18

## 2023-12-18 RX ADMIN — CYANOCOBALAMIN 1000 MCG: 1000 INJECTION, SOLUTION INTRAMUSCULAR; SUBCUTANEOUS at 16:58

## 2023-12-18 NOTE — PROGRESS NOTES
Assessment/Plan:      Diagnoses and all orders for this visit:    Acute swimmer's ear of both sides  -     neomycin-polymyxin-hydrocortisone (CORTISPORIN) otic solution; Administer 4 drops into both ears 3 (three) times a day          Subjective:     Patient ID: Trinh Vilchis is a 88 y.o. female.    Patient presents with:  Earache: Pt states possible  ear infection finished her zpak today ears are clogged sounds like she is in a tunnel and yellowish and blood mucus coming out of nose pt would like her vitamin b 12 shot today no other problems and concerns kennedy        Earache   There is pain in both ears. This is a recurrent problem. The current episode started in the past 7 days. The problem occurs constantly.       Review of Systems   Constitutional: Negative.    HENT:  Positive for ear pain.    Respiratory: Negative.     Cardiovascular: Negative.          Objective:     Physical Exam  Vitals and nursing note reviewed.   Constitutional:       Appearance: She is well-developed.   HENT:      Head: Normocephalic and atraumatic.      Right Ear: No drainage. Tympanic membrane is not erythematous.      Left Ear: Tympanic membrane normal. No drainage.      Nose: No congestion or rhinorrhea.   Eyes:      Pupils: Pupils are equal, round, and reactive to light.   Neck:      Vascular: No JVD.   Cardiovascular:      Rate and Rhythm: Normal rate.   Pulmonary:      Effort: Pulmonary effort is normal.   Abdominal:      Palpations: Abdomen is soft.   Musculoskeletal:      Cervical back: Normal range of motion.   Lymphadenopathy:      Cervical: No cervical adenopathy.   Neurological:      General: No focal deficit present.      Mental Status: She is alert and oriented to person, place, and time.

## 2023-12-28 ENCOUNTER — APPOINTMENT (EMERGENCY)
Dept: CT IMAGING | Facility: HOSPITAL | Age: 88
End: 2023-12-28
Payer: MEDICARE

## 2023-12-28 ENCOUNTER — HOSPITAL ENCOUNTER (EMERGENCY)
Facility: HOSPITAL | Age: 88
Discharge: HOME | End: 2023-12-28
Attending: EMERGENCY MEDICINE
Payer: MEDICARE

## 2023-12-28 VITALS
WEIGHT: 138.89 LBS | RESPIRATION RATE: 18 BRPM | TEMPERATURE: 98 F | OXYGEN SATURATION: 100 % | DIASTOLIC BLOOD PRESSURE: 65 MMHG | SYSTOLIC BLOOD PRESSURE: 147 MMHG | HEART RATE: 76 BPM | BODY MASS INDEX: 26.24 KG/M2

## 2023-12-28 DIAGNOSIS — H70.91: Primary | ICD-10-CM

## 2023-12-28 DIAGNOSIS — H72.2X1 TYMPANIC MEMBRANE PERFORATION, MARGINAL, RIGHT: ICD-10-CM

## 2023-12-28 LAB
ALBUMIN SERPL BCP-MCNC: 3.8 G/DL (ref 3.5–5)
ALP SERPL-CCNC: 89 U/L (ref 34–104)
ALT SERPL W P-5'-P-CCNC: 29 U/L (ref 7–52)
ANION GAP SERPL CALCULATED.3IONS-SCNC: 7 MMOL/L
AST SERPL W P-5'-P-CCNC: 21 U/L (ref 13–39)
BASOPHILS # BLD AUTO: 0.04 THOUSANDS/ÂΜL (ref 0–0.1)
BASOPHILS NFR BLD AUTO: 0 % (ref 0–1)
BILIRUB SERPL-MCNC: 0.33 MG/DL (ref 0.2–1)
BUN SERPL-MCNC: 27 MG/DL (ref 5–25)
CALCIUM SERPL-MCNC: 9.7 MG/DL (ref 8.4–10.2)
CHLORIDE SERPL-SCNC: 97 MMOL/L (ref 96–108)
CO2 SERPL-SCNC: 27 MMOL/L (ref 21–32)
CREAT SERPL-MCNC: 0.84 MG/DL (ref 0.6–1.3)
EOSINOPHIL # BLD AUTO: 0.03 THOUSAND/ÂΜL (ref 0–0.61)
EOSINOPHIL NFR BLD AUTO: 0 % (ref 0–6)
ERYTHROCYTE [DISTWIDTH] IN BLOOD BY AUTOMATED COUNT: 11.9 % (ref 11.6–15.1)
GFR SERPL CREATININE-BSD FRML MDRD: 63 ML/MIN/1.73SQ M
GLUCOSE SERPL-MCNC: 327 MG/DL (ref 65–140)
HCT VFR BLD AUTO: 34.6 % (ref 34.8–46.1)
HGB BLD-MCNC: 11.7 G/DL (ref 11.5–15.4)
IMM GRANULOCYTES # BLD AUTO: 0.07 THOUSAND/UL (ref 0–0.2)
IMM GRANULOCYTES NFR BLD AUTO: 1 % (ref 0–2)
LYMPHOCYTES # BLD AUTO: 1.4 THOUSANDS/ÂΜL (ref 0.6–4.47)
LYMPHOCYTES NFR BLD AUTO: 12 % (ref 14–44)
MCH RBC QN AUTO: 30.4 PG (ref 26.8–34.3)
MCHC RBC AUTO-ENTMCNC: 33.8 G/DL (ref 31.4–37.4)
MCV RBC AUTO: 90 FL (ref 82–98)
MONOCYTES # BLD AUTO: 0.98 THOUSAND/ÂΜL (ref 0.17–1.22)
MONOCYTES NFR BLD AUTO: 9 % (ref 4–12)
NEUTROPHILS # BLD AUTO: 9.01 THOUSANDS/ÂΜL (ref 1.85–7.62)
NEUTS SEG NFR BLD AUTO: 78 % (ref 43–75)
NRBC BLD AUTO-RTO: 0 /100 WBCS
PLATELET # BLD AUTO: 315 THOUSANDS/UL (ref 149–390)
PMV BLD AUTO: 10.3 FL (ref 8.9–12.7)
POTASSIUM SERPL-SCNC: 4.1 MMOL/L (ref 3.5–5.3)
PROT SERPL-MCNC: 7.3 G/DL (ref 6.4–8.4)
RBC # BLD AUTO: 3.85 MILLION/UL (ref 3.81–5.12)
SODIUM SERPL-SCNC: 131 MMOL/L (ref 135–147)
WBC # BLD AUTO: 11.53 THOUSAND/UL (ref 4.31–10.16)

## 2023-12-28 PROCEDURE — 99284 EMERGENCY DEPT VISIT MOD MDM: CPT | Performed by: EMERGENCY MEDICINE

## 2023-12-28 PROCEDURE — 85025 COMPLETE CBC W/AUTO DIFF WBC: CPT

## 2023-12-28 PROCEDURE — 87077 CULTURE AEROBIC IDENTIFY: CPT | Performed by: OTOLARYNGOLOGY

## 2023-12-28 PROCEDURE — 36415 COLL VENOUS BLD VENIPUNCTURE: CPT

## 2023-12-28 PROCEDURE — 87205 SMEAR GRAM STAIN: CPT | Performed by: OTOLARYNGOLOGY

## 2023-12-28 PROCEDURE — 96365 THER/PROPH/DIAG IV INF INIT: CPT

## 2023-12-28 PROCEDURE — 87070 CULTURE OTHR SPECIMN AEROBIC: CPT | Performed by: OTOLARYNGOLOGY

## 2023-12-28 PROCEDURE — 99283 EMERGENCY DEPT VISIT LOW MDM: CPT

## 2023-12-28 PROCEDURE — 80053 COMPREHEN METABOLIC PANEL: CPT

## 2023-12-28 PROCEDURE — G1004 CDSM NDSC: HCPCS

## 2023-12-28 PROCEDURE — 70480 CT ORBIT/EAR/FOSSA W/O DYE: CPT

## 2023-12-28 PROCEDURE — 87181 SC STD AGAR DILUTION PER AGT: CPT | Performed by: OTOLARYNGOLOGY

## 2023-12-28 RX ORDER — CEFTRIAXONE 1 G/50ML
1000 INJECTION, SOLUTION INTRAVENOUS ONCE
Status: COMPLETED | OUTPATIENT
Start: 2023-12-28 | End: 2023-12-28

## 2023-12-28 RX ADMIN — CEFTRIAXONE 1000 MG: 1 INJECTION, SOLUTION INTRAVENOUS at 10:45

## 2023-12-28 NOTE — ED PROVIDER NOTES
History  Chief Complaint   Patient presents with    Earache     Right ear infection x 2 weeks . Pt reports continued ear pain and reports ear has been oozing blood since 3 am . Pt reports pain on mastoid bone     Trinh is an 88-year-old female with diabetes presenting to the emergency room with 2 weeks of right ear pain.  Patient states that she was diagnosed with a right ear infection 2 weeks ago.  She completed a course of azithromycin for the infection without relief.  She was then reseen by primary care who prescribed drops.  She has not been using the drops for a week now without improvement.  Trinh reports that she woke up this morning with profuse drainage coming out of her ear.  The drainage was initially bloody and then progressed to white.  She has active drainage occurring here in the ED.  Last night, the pain became more severe than it was previously.  She describes this as a throbbing pain that she could feel in her ear.  For about a week, she has had pain behind her ear over the mastoid.  She has not had fevers or chills at home.  States that she has some decreased hearing in the right ear.  Patient is allergic to numerous antibiotics.      Earache  Location:  Right  Behind ear:  Redness  Quality:  Sharp and throbbing  Severity:  Severe  Onset quality:  Gradual  Duration:  2 weeks  Timing:  Intermittent  Progression:  Worsening  Chronicity:  New  Context: recent URI    Relieved by:  Nothing  Worsened by:  Nothing  Ineffective treatments:  OTC medications  Associated symptoms: ear discharge and hearing loss    Associated symptoms: no abdominal pain, no congestion, no cough, no diarrhea, no fever, no headaches, no neck pain, no rash, no rhinorrhea, no sore throat, no tinnitus and no vomiting        Prior to Admission Medications   Prescriptions Last Dose Informant Patient Reported? Taking?   Brinzolamide-Brimonidine 1-0.2 % SUSP  Self Yes No   Sig: Apply to eye   CeleBREX 200 MG capsule   No No    Sig: TAKE ONE CAPSULE BY MOUTH DAILY WITH A MEAL   Cranberry 450 MG TABS  Self Yes No   Sig: Take by mouth   Diovan -12.5 MG per tablet  Self No No   Sig: TAKE 1 TABLET BY MOUTH EVERY DAY   Loratadine 10 MG CAPS  Self Yes No   Sig: Take by mouth   Multiple Vitamins-Minerals (CENTRUM SILVER ULTRA WOMENS PO)  Self Yes No   Sig: Take 1 tablet by mouth daily   Norvasc 5 MG tablet  Self No No   Sig: TAKE 1 TABLET BY MOUTH EVERY DAY   Probiotic Product (ALIGN PO)  Self Yes No   Sig: Take by mouth daily   Synthroid 25 MCG tablet   No No   Sig: TAKE 1 TABLET BY MOUTH EVERY DAY   Toprol XL 50 MG 24 hr tablet   No No   Sig: TAKE 1 TABLET BY MOUTH EVERY DAY   Vytorin 10-20 MG per tablet   No No   Sig: TAKE 1 TABLET BY MOUTH EVERYDAY AT BEDTIME   aspirin 81 MG tablet  Self Yes No   Sig: Take 1 tablet by mouth daily   bimatoprost (LUMIGAN) 0.01 % ophthalmic drops  Self Yes No   Sig: Apply 1 drop to eye Daily   co-enzyme Q-10 30 MG capsule  Self Yes No   Sig: Take 30 mg by mouth daily   conjugated estrogens (Premarin) 0.3 mg tablet  Self No No   Sig: TAKE 1 TABLET BY MOUTH DAILY. NONE ON WEEKENDS   estradiol (ESTRACE VAGINAL) 0.1 mg/g vaginal cream  Self No No   Sig: Insert 0.5 g into the vagina 2 (two) times a week   polyethylene glycol-propylene glycol (SYSTANE) 0.4-0.3 %  Self Yes No   Sig: Apply to eye      Facility-Administered Medications Last Administration Doses Remaining   cyanocobalamin injection 1,000 mcg 2/7/2023  3:45 PM    cyanocobalamin injection 1,000 mcg 8/24/2023  1:57 PM    cyanocobalamin injection 1,000 mcg 9/8/2023  9:42 AM    cyanocobalamin injection 1,000 mcg 12/18/2023  4:58 PM           Past Medical History:   Diagnosis Date    Administration of long-term prophylactic antibiotics 04/29/2014    Need for Prophylactic Antibiotics    Glaucoma     Hypercholesterolemia     Hypertension        Past Surgical History:   Procedure Laterality Date    CHOLECYSTECTOMY      TONSILLECTOMY      TOTAL ABDOMINAL  HYSTERECTOMY W/ BILATERAL SALPINGOOPHORECTOMY      with Entercele Repair       Family History   Problem Relation Age of Onset    No Known Problems Mother      I have reviewed and agree with the history as documented.    E-Cigarette/Vaping    E-Cigarette Use Never User      E-Cigarette/Vaping Substances    Nicotine No     THC No     CBD No     Flavoring No     Other No     Unknown No      Social History     Tobacco Use    Smoking status: Former    Smokeless tobacco: Never   Vaping Use    Vaping status: Never Used   Substance Use Topics    Alcohol use: Yes     Comment: Social drinker    Drug use: No       Review of Systems   Constitutional:  Negative for chills and fever.   HENT:  Positive for ear discharge, ear pain and hearing loss. Negative for congestion, facial swelling, mouth sores, nosebleeds, postnasal drip, rhinorrhea, sinus pressure, sinus pain, sneezing, sore throat and tinnitus.    Eyes:  Negative for pain and visual disturbance.   Respiratory:  Negative for cough and shortness of breath.    Cardiovascular:  Negative for chest pain and palpitations.   Gastrointestinal:  Negative for abdominal pain, diarrhea and vomiting.   Genitourinary:  Negative for dysuria and hematuria.   Musculoskeletal:  Negative for arthralgias, back pain and neck pain.   Skin:  Negative for color change and rash.   Neurological:  Negative for dizziness, seizures, syncope, weakness, numbness and headaches.   All other systems reviewed and are negative.      Physical Exam  Physical Exam  Vitals and nursing note reviewed.   Constitutional:       General: She is not in acute distress.     Appearance: She is well-developed.   HENT:      Head: Normocephalic and atraumatic. No right periorbital erythema or left periorbital erythema.      Jaw: No trismus, tenderness or swelling.      Right Ear: Decreased hearing noted. Drainage, swelling and tenderness present. There is no impacted cerumen. There is mastoid tenderness. No hemotympanum.  Tympanic membrane is perforated. Tympanic membrane is not injected, scarred, erythematous, retracted or bulging.      Left Ear: Hearing, tympanic membrane, ear canal and external ear normal.      Ears:      Comments: Copious white discharge from the right ear.  Discharge present along entire external auditory canal.  TM is not injected, but upper right corner is not visible.  Patient has tenderness to palpation over the mastoid.     Nose: No congestion or rhinorrhea.      Mouth/Throat:      Mouth: Mucous membranes are moist.   Eyes:      Conjunctiva/sclera: Conjunctivae normal.   Cardiovascular:      Rate and Rhythm: Normal rate and regular rhythm.      Heart sounds: No murmur heard.  Pulmonary:      Effort: Pulmonary effort is normal. No respiratory distress.      Breath sounds: Normal breath sounds.   Abdominal:      Palpations: Abdomen is soft.      Tenderness: There is no abdominal tenderness.   Musculoskeletal:         General: No swelling.      Cervical back: Neck supple.   Skin:     General: Skin is warm and dry.      Capillary Refill: Capillary refill takes less than 2 seconds.   Neurological:      General: No focal deficit present.      Mental Status: She is alert and oriented to person, place, and time.   Psychiatric:         Mood and Affect: Mood normal.         Vital Signs  ED Triage Vitals [12/28/23 0926]   Temperature Pulse Respirations Blood Pressure SpO2   98 °F (36.7 °C) 76 18 147/65 100 %      Temp Source Heart Rate Source Patient Position - Orthostatic VS BP Location FiO2 (%)   Temporal Monitor Sitting Right arm --      Pain Score       --           Vitals:    12/28/23 0926   BP: 147/65   Pulse: 76   Patient Position - Orthostatic VS: Sitting         Visual Acuity      ED Medications  Medications   cefTRIAXone (ROCEPHIN) IVPB (premix in dextrose) 1,000 mg 50 mL (0 mg Intravenous Stopped 12/28/23 1115)       Diagnostic Studies  Results Reviewed       Procedure Component Value Units Date/Time     Comprehensive metabolic panel [[id number]]  (Abnormal) Collected: 12/28/23 1044    Lab Status: Final result Specimen: Blood from Arm, Left Updated: 12/28/23 1113     Sodium 131 mmol/L      Potassium 4.1 mmol/L      Chloride 97 mmol/L      CO2 27 mmol/L      ANION GAP 7 mmol/L      BUN 27 mg/dL      Creatinine 0.84 mg/dL      Glucose 327 mg/dL      Calcium 9.7 mg/dL      AST 21 U/L      ALT 29 U/L      Alkaline Phosphatase 89 U/L      Total Protein 7.3 g/dL      Albumin 3.8 g/dL      Total Bilirubin 0.33 mg/dL      eGFR 63 ml/min/1.73sq m     Narrative:      National Kid[employer] Disease Foundation guidelines for Chronic Kidney Disease (CKD):     Stage 1 with normal or high GFR (GFR > 90 mL/min/1.73 square meters)    Stage 2 Mild CKD (GFR = 60-89 mL/min/1.73 square meters)    Stage 3A Moderate CKD (GFR = 45-59 mL/min/1.73 square meters)    Stage 3B Moderate CKD (GFR = 30-44 mL/min/1.73 square meters)    Stage 4 Severe CKD (GFR = 15-29 mL/min/1.73 square meters)    Stage 5 End Stage CKD (GFR <15 mL/min/1.73 square meters)  Note: GFR calculation is accurate only with a steady state creatinine    CBC and differential [[id number]]  (Abnormal) Collected: 12/28/23 1044    Lab Status: Final result Specimen: Blood from Arm, Left Updated: 12/28/23 1054     WBC 11.53 Thousand/uL      RBC 3.85 Million/uL      Hemoglobin 11.7 g/dL      Hematocrit 34.6 %      MCV 90 fL      MCH 30.4 pg      MCHC 33.8 g/dL      RDW 11.9 %      MPV 10.3 fL      Platelets 315 Thousands/uL      nRBC 0 /100 WBCs      Neutrophils Relative 78 %      Immat GRANS % 1 %      Lymphocytes Relative 12 %      Monocytes Relative 9 %      Eosinophils Relative 0 %      Basophils Relative 0 %      Neutrophils Absolute 9.01 Thousands/µL      Immature Grans Absolute 0.07 Thousand/uL      Lymphocytes Absolute 1.40 Thousands/µL      Monocytes Absolute 0.98 Thousand/µL      Eosinophils Absolute 0.03 Thousand/µL      Basophils Absolute 0.04 Thousands/µL                    CT  orbits/temporal bones/skull base wo contrast   Final Result by E. Alec Schoenberger, MD (12/28 1136)      Near complete opacification of the right middle ear and mastoid air cells correlating with clinically suspected otomastoiditis.            Workstation performed: [id number]                    Procedures  Procedures         ED Course  ED Course as of 12/28/23 1537   Thu Dec 28, 2023   1141 Patient comfortable in room.  CT read.  Consistent with mastoiditis.                               SBIRT 20yo+      Flowsheet Row Most Recent Value   Initial Alcohol Screen: US AUDIT-C     1. How often do you have a drink containing alcohol? 0 Filed at: 12/28/2023 0940   2. How many drinks containing alcohol do you have on a typical day you are drinking?  0 Filed at: 12/28/2023 0940   3b. FEMALE Any Age, or MALE 65+: How often do you have 4 or more drinks on one occassion? 0 Filed at: 12/28/2023 0940   Audit-C Score 0 Filed at: 12/28/2023 0940   ZARIA: How many times in the past year have you...    Used an illegal drug or used a prescription medication for non-medical reasons? Never Filed at: 12/28/2023 0940                      Medical Decision Making  Patient presents with ear discharge, pain, mastoid tenderness after being treated for sinusitis and ear infection.  Fluid present likely from tympanic membrane rupture.  TM appears normal on inspection, but upper right corner is not visible.  There is likely a perforation there.  Due to tenderness over the mastoid CT ordered to rule out mastoiditis.  CT indicative of mastoiditis.  Initial dose of Rocephin given here due to risk of spread of infection.  WBC mildly elevated likely due to mastoiditis.  Patient states that she previously tolerated cephalosporins.  Discussed the case with ENT.  Dr. Styles recommended outpatient follow-up today in his office.  He reports that he will initiate antibiotics and any other necessary treatment.  Dr. Johnson consulted and agrees with plan.   Patient discharged in stable condition, patient and family understand and agree with plan.  Gave indications for return.    Amount and/or Complexity of Data Reviewed  Labs: ordered.  Radiology: ordered.    Risk  Prescription drug management.             Disposition  Final diagnoses:   Infection of right mastoid bone   Tympanic membrane perforation, marginal, right     Time reflects when diagnosis was documented in both MDM as applicable and the Disposition within this note       Time User Action Codes Description Comment    12/28/2023 12:26 PM Iza Medrano [H70.91] Infection of right mastoid bone     12/28/2023 12:27 PM Iza Medrano [H72.2X1] Tympanic membrane perforation, marginal, right           ED Disposition       ED Disposition   Discharge    Condition   Stable    Date/Time   Thu Dec 28, 2023 1227    Comment   Trinh Vilchis discharge to home/self care.                   Follow-up Information       Follow up With Specialties Details Why Contact Info    Dagoberto Styles DO Otolaryngology   [address] [facility]  [address]  [place] [address]  [phone]              Discharge Medication List as of 12/28/2023 12:28 PM        CONTINUE these medications which have NOT CHANGED    Details   aspirin 81 MG tablet Take 1 tablet by mouth daily, Starting Thu 6/4/2015, Historical Med      bimatoprost (LUMIGAN) 0.01 % ophthalmic drops Apply 1 drop to eye Daily, Starting Mon 7/7/2014, Historical Med      Brinzolamide-Brimonidine 1-0.2 % SUSP Apply to eye, Starting Thu 6/4/2015, Historical Med      CeleBREX 200 MG capsule TAKE ONE CAPSULE BY MOUTH DAILY WITH A MEAL, Normal      co-enzyme Q-10 30 MG capsule Take 30 mg by mouth daily, Historical Med      conjugated estrogens (Premarin) 0.3 mg tablet TAKE 1 TABLET BY MOUTH DAILY. NONE ON WEEKENDS, Normal      Cranberry 450 MG TABS Take by mouth, Starting Tue 1/8/2013, Historical Med      Diovan -12.5 MG per tablet TAKE 1 TABLET BY MOUTH EVERY DAY,  Normal      estradiol (ESTRACE VAGINAL) 0.1 mg/g vaginal cream Insert 0.5 g into the vagina 2 (two) times a week, Starting Thu 8/24/2023, Normal      Loratadine 10 MG CAPS Take by mouth, Starting Fri 5/16/2014, Historical Med      Multiple Vitamins-Minerals (CENTRUM SILVER ULTRA WOMENS PO) Take 1 tablet by mouth daily, Starting Tue 1/8/2013, Historical Med      Norvasc 5 MG tablet TAKE 1 TABLET BY MOUTH EVERY DAY, Normal      polyethylene glycol-propylene glycol (SYSTANE) 0.4-0.3 % Apply to eye, Starting Tue 9/1/2015, Historical Med      Probiotic Product (ALIGN PO) Take by mouth daily, Historical Med      Synthroid 25 MCG tablet TAKE 1 TABLET BY MOUTH EVERY DAY, Normal      Toprol XL 50 MG 24 hr tablet TAKE 1 TABLET BY MOUTH EVERY DAY, Normal      Vytorin 10-20 MG per tablet TAKE 1 TABLET BY MOUTH EVERYDAY AT BEDTIME, Normal      neomycin-polymyxin-hydrocortisone (CORTISPORIN) otic solution Administer 4 drops into both ears 3 (three) times a day, Starting Mon 12/18/2023, Normal             No discharge procedures on file.    PDMP Review       None            ED Provider  Electronically Signed by             Iza Medrano PA-C  12/28/23 7525

## 2023-12-28 NOTE — ED ATTENDING ATTESTATION
I supervised the Advanced Practitioner.? I performed, in its entirety, the history, exam, and assessment/plan component of the visit.  I agree with the Advanced Practitioner's note with the following additions/exceptions:        An 88-year-old female presents with right ear pain that has progressed over the past 2 weeks.  Patient was initially started on azithromycin due to her penicillin allergy.  When symptoms did not improve she was placed on Cortisporin eardrops.  Over the past few days she has had persistent right ear pain as well as severe pain over the right mastoid region.  Patient states this morning around 3 am she developed acute onset of drainage from the right ear.  She does report some improvement in the ear and mastoid pain.  Denies history of recurrent ear infections.    On exam, patient does have tenderness over the right mastoid without swelling or erythema.  Right external auditory canal noted to have purulent debris.  Right TM partially visualized and within normal limits.    A/P:  An 88-year-old female with right ear pain, ongoing over the past 2 weeks.  Now with right ear drainage and tenderness over the right mastoid.  Concern for mastoiditis.  Also likely patient has a ruptured TM which is unable to be visualized due to debris within the ear canal.  Will check labs, CT scan and start IV antibiotics.    Rhonda Johnson,  12/28/23             ED Course         Critical Care Time  Procedures

## 2023-12-28 NOTE — DISCHARGE INSTRUCTIONS
Go directly to ENT office location provided above from emergency room.  Return with worsening symptoms.

## 2024-01-08 ENCOUNTER — RA CDI HCC (OUTPATIENT)
Dept: OTHER | Facility: HOSPITAL | Age: 89
End: 2024-01-08

## 2024-01-12 ENCOUNTER — OFFICE VISIT (OUTPATIENT)
Dept: FAMILY MEDICINE CLINIC | Facility: CLINIC | Age: 89
End: 2024-01-12
Payer: MEDICARE

## 2024-01-12 VITALS
DIASTOLIC BLOOD PRESSURE: 80 MMHG | HEART RATE: 80 BPM | BODY MASS INDEX: 26.41 KG/M2 | TEMPERATURE: 97.5 F | SYSTOLIC BLOOD PRESSURE: 110 MMHG | WEIGHT: 139.8 LBS | OXYGEN SATURATION: 98 %

## 2024-01-12 DIAGNOSIS — E53.8 VITAMIN B12 DEFICIENCY: ICD-10-CM

## 2024-01-12 DIAGNOSIS — E06.3 HYPOTHYROIDISM DUE TO HASHIMOTO'S THYROIDITIS: ICD-10-CM

## 2024-01-12 DIAGNOSIS — I10 PRIMARY HYPERTENSION: ICD-10-CM

## 2024-01-12 DIAGNOSIS — E03.8 HYPOTHYROIDISM DUE TO HASHIMOTO'S THYROIDITIS: ICD-10-CM

## 2024-01-12 DIAGNOSIS — E11.9 TYPE 2 DIABETES MELLITUS WITHOUT COMPLICATION, WITHOUT LONG-TERM CURRENT USE OF INSULIN (HCC): Primary | ICD-10-CM

## 2024-01-12 DIAGNOSIS — H34.8122 CENTRAL RETINAL VEIN OCCLUSION OF LEFT EYE, UNSPECIFIED COMPLICATION STATUS: ICD-10-CM

## 2024-01-12 DIAGNOSIS — Z98.818 OTHER DENTAL PROCEDURE STATUS: ICD-10-CM

## 2024-01-12 PROCEDURE — 99214 OFFICE O/P EST MOD 30 MIN: CPT | Performed by: FAMILY MEDICINE

## 2024-01-12 PROCEDURE — 96372 THER/PROPH/DIAG INJ SC/IM: CPT | Performed by: FAMILY MEDICINE

## 2024-01-12 RX ORDER — CEPHALEXIN 500 MG/1
500 CAPSULE ORAL EVERY 6 HOURS SCHEDULED
COMMUNITY
End: 2024-01-12 | Stop reason: SDUPTHER

## 2024-01-12 RX ORDER — CYANOCOBALAMIN 1000 UG/ML
1000 INJECTION, SOLUTION INTRAMUSCULAR; SUBCUTANEOUS
Status: SHIPPED | OUTPATIENT
Start: 2024-01-12

## 2024-01-12 RX ORDER — CEPHALEXIN 500 MG/1
CAPSULE ORAL
Qty: 10 CAPSULE | Refills: 2 | Status: SHIPPED | OUTPATIENT
Start: 2024-01-12 | End: 2024-01-26

## 2024-01-12 RX ADMIN — CYANOCOBALAMIN 1000 MCG: 1000 INJECTION, SOLUTION INTRAMUSCULAR; SUBCUTANEOUS at 12:59

## 2024-01-12 NOTE — PROGRESS NOTES
Assessment/Plan: Patient will follow-up with ENT.  Patient will be seeing them on the 25th.  Labs reviewed.  Patient will continue with current regimen for all chronic conditions.  Patient will do labs prior to next visit in 3 months.  Medications will be refilled as these are on automatic refill.  Patient up-to-date with flu shot and pneumonia shot and RSV vaccine.       Diagnoses and all orders for this visit:    Type 2 diabetes mellitus without complication, without long-term current use of insulin (HCC)    Hypothyroidism due to Hashimoto's thyroiditis    Primary hypertension    Other dental procedure status  -     cephalexin (KEFLEX) 500 mg capsule; Take 1 pill 1 hour prior to dentist    Central retinal vein occlusion of left eye, unspecified complication status    Other orders  -     Discontinue: cephalexin (KEFLEX) 500 mg capsule; Take 500 mg by mouth every 6 (six) hours            Subjective:        Patient ID: Trinh Vilchis is a 88 y.o. female.      Patient with diabetes, hypothyroidism, hypertension.  Patient with recent mastoiditis and patient completed Keflex and is still using Cipro eardrops.  Patient is working with ENT.  No fever noted.  Decreased hearing out of right ear.  No left ear involvement.  Patient otherwise in normal state of health other than some bleeding from the sinuses.  No other new complaints.          The following portions of the patient's history were reviewed and updated as appropriate: allergies, current medications, past family history, past medical history, past social history, past surgical history and problem list.      Review of Systems   Constitutional: Negative.    HENT:  Positive for ear discharge and hearing loss.    Eyes: Negative.    Respiratory: Negative.     Cardiovascular:  Positive for leg swelling.   Gastrointestinal: Negative.    Endocrine: Negative.    Genitourinary: Negative.    Musculoskeletal: Negative.    Skin: Negative.    Allergic/Immunologic: Negative.     Neurological: Negative.    Hematological: Negative.    Psychiatric/Behavioral: Negative.             Objective:               /80 (BP Location: Right arm, Patient Position: Sitting, Cuff Size: Standard)   Pulse 80   Temp 97.5 °F (36.4 °C) (Temporal)   Wt 63.4 kg (139 lb 12.8 oz)   LMP  (LMP Unknown)   SpO2 98%   BMI 26.41 kg/m²          Physical Exam  Vitals and nursing note reviewed.   Constitutional:       General: She is not in acute distress.     Appearance: Normal appearance. She is not ill-appearing, toxic-appearing or diaphoretic.   HENT:      Head: Normocephalic and atraumatic.      Left Ear: Tympanic membrane, ear canal and external ear normal. There is no impacted cerumen.      Nose: Nose normal. No congestion or rhinorrhea.      Mouth/Throat:      Mouth: Mucous membranes are moist.      Pharynx: No oropharyngeal exudate or posterior oropharyngeal erythema.   Eyes:      General: No scleral icterus.        Right eye: No discharge.         Left eye: No discharge.   Neck:      Vascular: No carotid bruit.   Cardiovascular:      Rate and Rhythm: Normal rate and regular rhythm.      Pulses: Normal pulses.      Heart sounds: Normal heart sounds. No murmur heard.     No friction rub. No gallop.   Pulmonary:      Effort: Pulmonary effort is normal. No respiratory distress.      Breath sounds: Normal breath sounds. No stridor. No wheezing, rhonchi or rales.   Chest:      Chest wall: No tenderness.   Musculoskeletal:         General: No swelling, tenderness, deformity or signs of injury. Normal range of motion.      Cervical back: Normal range of motion and neck supple. No rigidity. No muscular tenderness.      Right lower leg: No edema.      Left lower leg: No edema.   Lymphadenopathy:      Cervical: No cervical adenopathy.   Skin:     General: Skin is warm and dry.      Capillary Refill: Capillary refill takes less than 2 seconds.      Coloration: Skin is not jaundiced.      Findings: No bruising,  erythema, lesion or rash.   Neurological:      Mental Status: She is alert and oriented to person, place, and time.      Cranial Nerves: No cranial nerve deficit.      Sensory: No sensory deficit.      Motor: No weakness.      Coordination: Coordination normal.      Gait: Gait normal.   Psychiatric:         Mood and Affect: Mood normal.         Behavior: Behavior normal.         Thought Content: Thought content normal.         Judgment: Judgment normal.

## 2024-02-05 ENCOUNTER — TELEPHONE (OUTPATIENT)
Dept: FAMILY MEDICINE CLINIC | Facility: CLINIC | Age: 89
End: 2024-02-05

## 2024-02-05 ENCOUNTER — CLINICAL SUPPORT (OUTPATIENT)
Dept: FAMILY MEDICINE CLINIC | Facility: CLINIC | Age: 89
End: 2024-02-05

## 2024-02-05 DIAGNOSIS — E53.8 VITAMIN B12 DEFICIENCY: Primary | ICD-10-CM

## 2024-02-05 RX ORDER — CYANOCOBALAMIN 1000 UG/ML
1000 INJECTION, SOLUTION INTRAMUSCULAR; SUBCUTANEOUS
Status: SHIPPED | OUTPATIENT
Start: 2024-02-05

## 2024-02-20 DIAGNOSIS — I10 ESSENTIAL HYPERTENSION: ICD-10-CM

## 2024-02-21 PROBLEM — J01.00 ACUTE NON-RECURRENT MAXILLARY SINUSITIS: Status: RESOLVED | Noted: 2019-01-25 | Resolved: 2024-02-21

## 2024-02-21 RX ORDER — VALSARTAN AND HYDROCHLOROTHIAZIDE 160; 12.5 MG/1; MG/1
TABLET, FILM COATED ORAL
Qty: 90 TABLET | Refills: 1 | Status: SHIPPED | OUTPATIENT
Start: 2024-02-21

## 2024-03-01 ENCOUNTER — TELEPHONE (OUTPATIENT)
Dept: FAMILY MEDICINE CLINIC | Facility: CLINIC | Age: 89
End: 2024-03-01

## 2024-03-05 DIAGNOSIS — I10 ESSENTIAL HYPERTENSION: ICD-10-CM

## 2024-03-05 RX ORDER — AMLODIPINE BESYLATE 5 MG
TABLET ORAL
Qty: 90 TABLET | Refills: 1 | Status: SHIPPED | OUTPATIENT
Start: 2024-03-05

## 2024-03-14 ENCOUNTER — CLINICAL SUPPORT (OUTPATIENT)
Dept: FAMILY MEDICINE CLINIC | Facility: CLINIC | Age: 89
End: 2024-03-14
Payer: MEDICARE

## 2024-03-14 DIAGNOSIS — E53.8 VITAMIN B12 DEFICIENCY: Primary | ICD-10-CM

## 2024-03-14 PROCEDURE — 96372 THER/PROPH/DIAG INJ SC/IM: CPT

## 2024-03-14 RX ORDER — CYANOCOBALAMIN 1000 UG/ML
1000 INJECTION, SOLUTION INTRAMUSCULAR; SUBCUTANEOUS
Status: CANCELLED | OUTPATIENT
Start: 2024-03-14

## 2024-03-14 RX ADMIN — CYANOCOBALAMIN 1000 MCG: 1000 INJECTION, SOLUTION INTRAMUSCULAR; SUBCUTANEOUS at 13:13

## 2024-04-15 ENCOUNTER — TELEPHONE (OUTPATIENT)
Age: 89
End: 2024-04-15

## 2024-04-15 ENCOUNTER — APPOINTMENT (OUTPATIENT)
Dept: LAB | Facility: MEDICAL CENTER | Age: 89
End: 2024-04-15
Payer: MEDICARE

## 2024-04-15 DIAGNOSIS — Z11.59 NEED FOR HEPATITIS C SCREENING TEST: ICD-10-CM

## 2024-04-15 DIAGNOSIS — E11.9 TYPE 2 DIABETES MELLITUS WITHOUT COMPLICATION, WITHOUT LONG-TERM CURRENT USE OF INSULIN (HCC): ICD-10-CM

## 2024-04-15 LAB
ALBUMIN SERPL BCP-MCNC: 4.1 G/DL (ref 3.5–5)
ALP SERPL-CCNC: 64 U/L (ref 34–104)
ALT SERPL W P-5'-P-CCNC: 25 U/L (ref 7–52)
ANION GAP SERPL CALCULATED.3IONS-SCNC: 9 MMOL/L (ref 4–13)
AST SERPL W P-5'-P-CCNC: 29 U/L (ref 13–39)
BASOPHILS # BLD AUTO: 0.04 THOUSANDS/ÂΜL (ref 0–0.1)
BASOPHILS NFR BLD AUTO: 0 % (ref 0–1)
BILIRUB SERPL-MCNC: 0.49 MG/DL (ref 0.2–1)
BUN SERPL-MCNC: 25 MG/DL (ref 5–25)
CALCIUM SERPL-MCNC: 8.9 MG/DL (ref 8.4–10.2)
CHLORIDE SERPL-SCNC: 99 MMOL/L (ref 96–108)
CHOLEST SERPL-MCNC: 121 MG/DL
CO2 SERPL-SCNC: 29 MMOL/L (ref 21–32)
CREAT SERPL-MCNC: 0.79 MG/DL (ref 0.6–1.3)
EOSINOPHIL # BLD AUTO: 0.07 THOUSAND/ÂΜL (ref 0–0.61)
EOSINOPHIL NFR BLD AUTO: 1 % (ref 0–6)
ERYTHROCYTE [DISTWIDTH] IN BLOOD BY AUTOMATED COUNT: 13.3 % (ref 11.6–15.1)
EST. AVERAGE GLUCOSE BLD GHB EST-MCNC: 166 MG/DL
GFR SERPL CREATININE-BSD FRML MDRD: 67 ML/MIN/1.73SQ M
GLUCOSE P FAST SERPL-MCNC: 157 MG/DL (ref 65–99)
HBA1C MFR BLD: 7.4 %
HCT VFR BLD AUTO: 39.1 % (ref 34.8–46.1)
HDLC SERPL-MCNC: 52 MG/DL
HGB BLD-MCNC: 12.6 G/DL (ref 11.5–15.4)
IMM GRANULOCYTES # BLD AUTO: 0.04 THOUSAND/UL (ref 0–0.2)
IMM GRANULOCYTES NFR BLD AUTO: 0 % (ref 0–2)
LDLC SERPL CALC-MCNC: 41 MG/DL (ref 0–100)
LYMPHOCYTES # BLD AUTO: 1.53 THOUSANDS/ÂΜL (ref 0.6–4.47)
LYMPHOCYTES NFR BLD AUTO: 17 % (ref 14–44)
MCH RBC QN AUTO: 30 PG (ref 26.8–34.3)
MCHC RBC AUTO-ENTMCNC: 32.2 G/DL (ref 31.4–37.4)
MCV RBC AUTO: 93 FL (ref 82–98)
MONOCYTES # BLD AUTO: 0.78 THOUSAND/ÂΜL (ref 0.17–1.22)
MONOCYTES NFR BLD AUTO: 8 % (ref 4–12)
NEUTROPHILS # BLD AUTO: 6.81 THOUSANDS/ÂΜL (ref 1.85–7.62)
NEUTS SEG NFR BLD AUTO: 74 % (ref 43–75)
NONHDLC SERPL-MCNC: 69 MG/DL
NRBC BLD AUTO-RTO: 0 /100 WBCS
PLATELET # BLD AUTO: 198 THOUSANDS/UL (ref 149–390)
PMV BLD AUTO: 13.3 FL (ref 8.9–12.7)
POTASSIUM SERPL-SCNC: 4.5 MMOL/L (ref 3.5–5.3)
PROT SERPL-MCNC: 6.6 G/DL (ref 6.4–8.4)
RBC # BLD AUTO: 4.2 MILLION/UL (ref 3.81–5.12)
SODIUM SERPL-SCNC: 137 MMOL/L (ref 135–147)
TRIGL SERPL-MCNC: 138 MG/DL
TSH SERPL DL<=0.05 MIU/L-ACNC: 2.47 UIU/ML (ref 0.45–4.5)
WBC # BLD AUTO: 9.27 THOUSAND/UL (ref 4.31–10.16)

## 2024-04-15 PROCEDURE — 83036 HEMOGLOBIN GLYCOSYLATED A1C: CPT

## 2024-04-15 PROCEDURE — 86803 HEPATITIS C AB TEST: CPT

## 2024-04-15 PROCEDURE — 80053 COMPREHEN METABOLIC PANEL: CPT

## 2024-04-15 PROCEDURE — 85025 COMPLETE CBC W/AUTO DIFF WBC: CPT

## 2024-04-15 PROCEDURE — 80061 LIPID PANEL: CPT

## 2024-04-15 PROCEDURE — 84443 ASSAY THYROID STIM HORMONE: CPT

## 2024-04-15 PROCEDURE — 36415 COLL VENOUS BLD VENIPUNCTURE: CPT

## 2024-04-15 NOTE — TELEPHONE ENCOUNTER
Called pt and left her a message asking for her to annette us back to let us know if she has enough meds to last her til 4/18 til she sees dr kerr again so that all meds can be adjusted and reordered at that time

## 2024-04-15 NOTE — TELEPHONE ENCOUNTER
Patient called in stating that all of her medications are needed to be the original order, no generics . States that recently she's been having them called in generic as she's having to go back to the pharmacy today for her Vytorin .

## 2024-04-16 LAB — HCV AB SER QL: NORMAL

## 2024-04-17 ENCOUNTER — OFFICE VISIT (OUTPATIENT)
Dept: FAMILY MEDICINE CLINIC | Facility: CLINIC | Age: 89
End: 2024-04-17
Payer: MEDICARE

## 2024-04-17 VITALS
HEART RATE: 64 BPM | WEIGHT: 144.6 LBS | BODY MASS INDEX: 27.3 KG/M2 | HEIGHT: 61 IN | TEMPERATURE: 98.4 F | DIASTOLIC BLOOD PRESSURE: 88 MMHG | SYSTOLIC BLOOD PRESSURE: 130 MMHG | OXYGEN SATURATION: 94 %

## 2024-04-17 DIAGNOSIS — J30.1 NON-SEASONAL ALLERGIC RHINITIS DUE TO POLLEN: Primary | ICD-10-CM

## 2024-04-17 DIAGNOSIS — I10 PRIMARY HYPERTENSION: ICD-10-CM

## 2024-04-17 DIAGNOSIS — E03.8 HYPOTHYROIDISM DUE TO HASHIMOTO'S THYROIDITIS: ICD-10-CM

## 2024-04-17 DIAGNOSIS — E06.3 HYPOTHYROIDISM DUE TO HASHIMOTO'S THYROIDITIS: ICD-10-CM

## 2024-04-17 DIAGNOSIS — E11.9 TYPE 2 DIABETES MELLITUS WITHOUT COMPLICATION, WITHOUT LONG-TERM CURRENT USE OF INSULIN (HCC): ICD-10-CM

## 2024-04-17 PROCEDURE — G2211 COMPLEX E/M VISIT ADD ON: HCPCS | Performed by: FAMILY MEDICINE

## 2024-04-17 PROCEDURE — 99214 OFFICE O/P EST MOD 30 MIN: CPT | Performed by: FAMILY MEDICINE

## 2024-04-17 NOTE — PROGRESS NOTES
Assessment/Plan: TSH 2.4 CBC normal.  A1c 7.4.  CMP reviewed lipid profile reviewed.  Patient will continue to modify diet regarding diabetes.  Patient wishes to hold off medication at this time.  Hypertension hypothyroidism hyperlipidemia all stable at this time continue with current regimen of medications.  Medications refilled automatically.  Patient may use saline. follow-up 4 months       Diagnoses and all orders for this visit:    Non-seasonal allergic rhinitis due to pollen    Primary hypertension    Hypothyroidism due to Hashimoto's thyroiditis    Type 2 diabetes mellitus without complication, without long-term current use of insulin ([facility])            Subjective:        Patient ID: Trinh Vilchis is a 89 y.o. female.      Patient is here with decreased hearing with pressure bilateral ears and dizziness and sinus congestion with nasal discharge.  Patient will be seeing ENT tomorrow morning.  No vertigo noted.  No chest pain or shortness of breath or fever with this.  Patient did use nasal saline.  Patient does use Claritin daily.  Patient also here to follow-up on diabetes hypertension hypothyroidism.  Patient had laboratory studies.    Dizziness  Associated symptoms include congestion. Pertinent negatives include no fever.         The following portions of the patient's history were reviewed and updated as appropriate: allergies, current medications, past family history, past medical history, past social history, past surgical history and problem list.      Review of Systems   Constitutional: Negative.  Negative for fever.   HENT:  Positive for congestion, hearing loss, postnasal drip and sinus pressure.    Eyes: Negative.    Respiratory: Negative.     Cardiovascular: Negative.    Gastrointestinal: Negative.    Endocrine: Negative.    Genitourinary: Negative.    Musculoskeletal: Negative.    Skin: Negative.    Allergic/Immunologic: Negative.    Neurological:  Positive for dizziness.   Hematological: Negative.  "   Psychiatric/Behavioral: Negative.             Objective:        Depression Screening and Follow-up Plan: Patient was screened for depression during today's encounter. They screened negative with a PHQ-2 score of 0.            /88 (BP Location: Right arm, Patient Position: Sitting, Cuff Size: Standard)   Pulse 64   Temp 98.4 °F (36.9 °C) (Temporal)   Ht 5' 1\" (1.549 m)   Wt 65.6 kg (144 lb 9.6 oz)   LMP  (LMP Unknown)   SpO2 94%   BMI 27.32 kg/m²          Physical Exam  Vitals and nursing note reviewed.   Constitutional:       General: She is not in acute distress.     Appearance: Normal appearance. She is not ill-appearing, toxic-appearing or diaphoretic.   HENT:      Head: Normocephalic and atraumatic.      Right Ear: Ear canal and external ear normal. There is no impacted cerumen.      Left Ear: Ear canal and external ear normal. There is impacted cerumen.      Ears:      Comments: To right TM     Nose: Nose normal. No congestion or rhinorrhea.      Mouth/Throat:      Mouth: Mucous membranes are moist.      Pharynx: Oropharyngeal exudate present. No posterior oropharyngeal erythema.   Eyes:      General: No scleral icterus.        Right eye: No discharge.         Left eye: No discharge.   Neck:      Vascular: No carotid bruit.   Cardiovascular:      Rate and Rhythm: Normal rate and regular rhythm.      Pulses: Normal pulses.      Heart sounds: Normal heart sounds. No murmur heard.     No friction rub. No gallop.   Pulmonary:      Effort: Pulmonary effort is normal. No respiratory distress.      Breath sounds: Normal breath sounds. No stridor. No wheezing, rhonchi or rales.   Chest:      Chest wall: No tenderness.   Musculoskeletal:         General: No swelling, tenderness, deformity or signs of injury. Normal range of motion.      Cervical back: Normal range of motion and neck supple. No rigidity. No muscular tenderness.      Right lower leg: No edema.      Left lower leg: No edema. "   Lymphadenopathy:      Cervical: No cervical adenopathy.   Skin:     General: Skin is warm and dry.      Capillary Refill: Capillary refill takes less than 2 seconds.      Coloration: Skin is not jaundiced.      Findings: No bruising, erythema, lesion or rash.   Neurological:      Mental Status: She is alert and oriented to person, place, and time. Mental status is at baseline.      Cranial Nerves: No cranial nerve deficit.      Sensory: No sensory deficit.      Motor: No weakness.      Coordination: Coordination normal.      Gait: Gait normal.   Psychiatric:         Mood and Affect: Mood normal.         Behavior: Behavior normal.         Thought Content: Thought content normal.         Judgment: Judgment normal.

## 2024-04-25 ENCOUNTER — CLINICAL SUPPORT (OUTPATIENT)
Dept: FAMILY MEDICINE CLINIC | Facility: CLINIC | Age: 89
End: 2024-04-25
Payer: MEDICARE

## 2024-04-25 DIAGNOSIS — E53.8 VITAMIN B12 DEFICIENCY: Primary | ICD-10-CM

## 2024-04-25 PROCEDURE — 96372 THER/PROPH/DIAG INJ SC/IM: CPT

## 2024-04-25 RX ADMIN — CYANOCOBALAMIN 1000 MCG: 1000 INJECTION, SOLUTION INTRAMUSCULAR; SUBCUTANEOUS at 10:05

## 2024-05-14 DIAGNOSIS — N95.2 VAGINAL ATROPHY: ICD-10-CM

## 2024-05-14 RX ORDER — ESTRADIOL 0.1 MG/G
0.5 CREAM VAGINAL 2 TIMES WEEKLY
Qty: 42.5 G | Refills: 2 | Status: SHIPPED | OUTPATIENT
Start: 2024-05-16

## 2024-05-20 ENCOUNTER — CLINICAL SUPPORT (OUTPATIENT)
Dept: FAMILY MEDICINE CLINIC | Facility: CLINIC | Age: 89
End: 2024-05-20
Payer: MEDICARE

## 2024-05-20 DIAGNOSIS — E53.8 VITAMIN B12 DEFICIENCY: Primary | ICD-10-CM

## 2024-05-20 PROCEDURE — 96372 THER/PROPH/DIAG INJ SC/IM: CPT

## 2024-05-20 RX ADMIN — CYANOCOBALAMIN 1000 MCG: 1000 INJECTION, SOLUTION INTRAMUSCULAR; SUBCUTANEOUS at 11:58

## 2024-05-25 DIAGNOSIS — I10 ESSENTIAL HYPERTENSION: ICD-10-CM

## 2024-05-25 DIAGNOSIS — R23.2 HOT FLASHES: ICD-10-CM

## 2024-05-25 DIAGNOSIS — E78.2 MIXED HYPERLIPIDEMIA: ICD-10-CM

## 2024-05-25 DIAGNOSIS — R79.89 ABNORMAL TSH: ICD-10-CM

## 2024-05-25 DIAGNOSIS — M19.90 ARTHRITIS: ICD-10-CM

## 2024-05-27 RX ORDER — LEVOTHYROXINE SODIUM 25 MCG
TABLET ORAL
Qty: 90 TABLET | Refills: 1 | Status: SHIPPED | OUTPATIENT
Start: 2024-05-27

## 2024-05-27 RX ORDER — METOPROLOL SUCCINATE 50 MG
TABLET, EXTENDED RELEASE 24 HR ORAL
Qty: 90 TABLET | Refills: 1 | Status: SHIPPED | OUTPATIENT
Start: 2024-05-27

## 2024-05-27 RX ORDER — EZETIMIBE AND SIMVASTATIN 10; 20 MG/1; MG/1
1 TABLET ORAL
Qty: 90 TABLET | Refills: 1 | Status: SHIPPED | OUTPATIENT
Start: 2024-05-27

## 2024-05-27 RX ORDER — VALSARTAN AND HYDROCHLOROTHIAZIDE 160; 12.5 MG/1; MG/1
TABLET, FILM COATED ORAL
Qty: 90 TABLET | Refills: 1 | Status: SHIPPED | OUTPATIENT
Start: 2024-05-27

## 2024-06-19 DIAGNOSIS — I10 ESSENTIAL HYPERTENSION: ICD-10-CM

## 2024-06-19 RX ORDER — AMLODIPINE BESYLATE 5 MG
TABLET ORAL
Qty: 90 TABLET | Refills: 1 | Status: SHIPPED | OUTPATIENT
Start: 2024-06-19

## 2024-07-24 ENCOUNTER — TELEPHONE (OUTPATIENT)
Age: 89
End: 2024-07-24

## 2024-07-24 ENCOUNTER — OFFICE VISIT (OUTPATIENT)
Dept: FAMILY MEDICINE CLINIC | Facility: CLINIC | Age: 89
End: 2024-07-24
Payer: MEDICARE

## 2024-07-24 VITALS
BODY MASS INDEX: 25.52 KG/M2 | OXYGEN SATURATION: 98 % | TEMPERATURE: 97.2 F | SYSTOLIC BLOOD PRESSURE: 150 MMHG | HEIGHT: 63 IN | WEIGHT: 144 LBS | DIASTOLIC BLOOD PRESSURE: 80 MMHG | HEART RATE: 94 BPM

## 2024-07-24 DIAGNOSIS — E03.8 HYPOTHYROIDISM DUE TO HASHIMOTO'S THYROIDITIS: ICD-10-CM

## 2024-07-24 DIAGNOSIS — E11.9 TYPE 2 DIABETES MELLITUS WITHOUT COMPLICATION, WITHOUT LONG-TERM CURRENT USE OF INSULIN (HCC): Primary | ICD-10-CM

## 2024-07-24 DIAGNOSIS — E78.2 MIXED HYPERLIPIDEMIA: ICD-10-CM

## 2024-07-24 DIAGNOSIS — I10 ESSENTIAL HYPERTENSION: ICD-10-CM

## 2024-07-24 DIAGNOSIS — E06.3 HYPOTHYROIDISM DUE TO HASHIMOTO'S THYROIDITIS: ICD-10-CM

## 2024-07-24 DIAGNOSIS — I10 PRIMARY HYPERTENSION: ICD-10-CM

## 2024-07-24 LAB — SL AMB POCT HEMOGLOBIN AIC: 7.1 (ref ?–6.5)

## 2024-07-24 PROCEDURE — 83036 HEMOGLOBIN GLYCOSYLATED A1C: CPT | Performed by: FAMILY MEDICINE

## 2024-07-24 PROCEDURE — 99214 OFFICE O/P EST MOD 30 MIN: CPT | Performed by: FAMILY MEDICINE

## 2024-07-24 RX ORDER — EZETIMIBE AND SIMVASTATIN 10; 20 MG/1; MG/1
1 TABLET ORAL
Qty: 90 TABLET | Refills: 1 | Status: SHIPPED | OUTPATIENT
Start: 2024-07-24

## 2024-07-24 RX ORDER — VALSARTAN AND HYDROCHLOROTHIAZIDE 160; 12.5 MG/1; MG/1
1 TABLET, FILM COATED ORAL DAILY
Qty: 90 TABLET | Refills: 1 | Status: SHIPPED | OUTPATIENT
Start: 2024-07-24

## 2024-07-24 NOTE — TELEPHONE ENCOUNTER
Patient is stated she needs this medication called in as brand necessary. She states she was having issues with the generic.

## 2024-07-24 NOTE — TELEPHONE ENCOUNTER
Patient called in stating she was seen this morning at the practice by Dr. Hooper. And patient gave the wrong medication name to the doctor and would want to speak to him on the same. Did ask her to provide the correct med name to me to relay the same to doctor. But she wish to speak to Dr. Hooper warm transferred the call and was answered by Nani took the patient call. Thanks

## 2024-07-24 NOTE — PROGRESS NOTES
Assessment/Plan: A1c 7.1 CMP CBC TSH lipid profile previous A1c all reviewed at this time.  Diabetes is improving.  Patient will continue to modify diet regarding diabetes.  Patient will switch to Diovan HCT brand-name only for hypertension.  Continue with current regimen of hyperlipidemia.  Hypothyroidism stable at this time.  Patient is Claritin for allergies.  Follow-up in 4 months.  Refills will be given when needed.       Diagnoses and all orders for this visit:    Type 2 diabetes mellitus without complication, without long-term current use of insulin ([facility])  -     POCT hemoglobin A1c    Primary hypertension    Mixed hyperlipidemia    Hypothyroidism due to Hashimoto's thyroiditis    Essential hypertension  -     Diovan -12.5 MG per tablet; Take 1 tablet by mouth daily            Subjective:        Patient ID: Trinh Vilchis is a 89 y.o. female.      Patient here to follow-up on diabetes hypertension hyperlipidemia hypothyroidism.  Patient with increased stress related to multiple issues around her house as well as a recent accident in her yard.  Patient on generic Diovan and feels that this is not controlling blood pressure as well.  Patient wishes to switch back.  No chest pain or shortness of breath.  Patient with some postnasal drip.  No fever.  No change in urination or defecation patterns.  Patient with some swelling of lower extremities which is chronic.          The following portions of the patient's history were reviewed and updated as appropriate: allergies, current medications, past family history, past medical history, past social history, past surgical history and problem list.      Review of Systems   Constitutional: Negative.    HENT:  Positive for postnasal drip.    Eyes: Negative.    Respiratory: Negative.     Cardiovascular:  Positive for leg swelling.   Gastrointestinal: Negative.    Endocrine: Negative.    Genitourinary: Negative.    Musculoskeletal:  Positive for arthralgias.   Skin:  "Negative.    Allergic/Immunologic: Negative.    Neurological: Negative.    Hematological: Negative.    Psychiatric/Behavioral: Negative.             Objective:               /80 (BP Location: Right arm, Patient Position: Sitting, Cuff Size: Standard)   Pulse 94   Temp (!) 97.2 °F (36.2 °C) (Temporal)   Ht 5' 3\" (1.6 m)   Wt 65.3 kg (144 lb)   LMP  (LMP Unknown)   SpO2 98%   BMI 25.51 kg/m²          Physical Exam  Vitals and nursing note reviewed.   Constitutional:       General: She is not in acute distress.     Appearance: Normal appearance. She is not ill-appearing, toxic-appearing or diaphoretic.   HENT:      Head: Normocephalic and atraumatic.      Right Ear: Tympanic membrane, ear canal and external ear normal. There is no impacted cerumen.      Left Ear: Tympanic membrane, ear canal and external ear normal. There is no impacted cerumen.      Nose: Nose normal. No congestion or rhinorrhea.      Mouth/Throat:      Mouth: Mucous membranes are moist.      Pharynx: No oropharyngeal exudate or posterior oropharyngeal erythema.   Eyes:      General: No scleral icterus.        Right eye: No discharge.         Left eye: No discharge.   Neck:      Vascular: No carotid bruit.   Cardiovascular:      Rate and Rhythm: Normal rate and regular rhythm.      Pulses: Normal pulses.      Heart sounds: Normal heart sounds. No murmur heard.     No friction rub. No gallop.   Pulmonary:      Effort: Pulmonary effort is normal. No respiratory distress.      Breath sounds: Normal breath sounds. No stridor. No wheezing, rhonchi or rales.   Chest:      Chest wall: No tenderness.   Musculoskeletal:         General: Deformity present. No swelling or tenderness. Normal range of motion.      Cervical back: Normal range of motion and neck supple. No rigidity. No muscular tenderness.      Right lower leg: Edema present.      Left lower leg: Edema present.      Comments: Trace edema bilateral lower extremities.   Lymphadenopathy: "      Cervical: No cervical adenopathy.   Skin:     General: Skin is warm and dry.      Capillary Refill: Capillary refill takes less than 2 seconds.      Coloration: Skin is not jaundiced.      Findings: No bruising, erythema, lesion or rash.   Neurological:      Mental Status: She is alert and oriented to person, place, and time. Mental status is at baseline.      Cranial Nerves: No cranial nerve deficit.      Sensory: No sensory deficit.      Motor: No weakness.      Coordination: Coordination normal.      Gait: Gait normal.   Psychiatric:         Mood and Affect: Mood normal.         Behavior: Behavior normal.         Thought Content: Thought content normal.         Judgment: Judgment normal.

## 2024-07-24 NOTE — TELEPHONE ENCOUNTER
Pt called, returning Nani's call, Warm TSF to  Giovani Prather [facility] to assist further.

## 2024-07-25 ENCOUNTER — OFFICE VISIT (OUTPATIENT)
Dept: FAMILY MEDICINE CLINIC | Facility: CLINIC | Age: 89
End: 2024-07-25
Payer: MEDICARE

## 2024-07-25 ENCOUNTER — TELEPHONE (OUTPATIENT)
Dept: FAMILY MEDICINE CLINIC | Facility: CLINIC | Age: 89
End: 2024-07-25

## 2024-07-25 ENCOUNTER — TELEPHONE (OUTPATIENT)
Age: 89
End: 2024-07-25

## 2024-07-25 DIAGNOSIS — E53.8 VITAMIN B12 DEFICIENCY: Primary | ICD-10-CM

## 2024-07-25 PROCEDURE — 96372 THER/PROPH/DIAG INJ SC/IM: CPT

## 2024-07-25 RX ADMIN — CYANOCOBALAMIN 1000 MCG: 1000 INJECTION, SOLUTION INTRAMUSCULAR; SUBCUTANEOUS at 12:59

## 2024-07-25 NOTE — TELEPHONE ENCOUNTER
PT WAS SEEN IN THE OFFICE YESTERDAY AND FORGOT TO GET HER B12 INJ SHE WANTS TO KNOW IF SHE CAN COME IN TODAY AND GET IT DONE.       [phone]

## 2024-07-25 NOTE — TELEPHONE ENCOUNTER
Pt called in to check on her B12 shot she forgot to ask for it in her yday visit. Erika transferred the call to practice was answered by Fabi and took the patient call. Thanks

## 2024-07-25 NOTE — TELEPHONE ENCOUNTER
Patient called, states she called earlier requesting to receive B-12 injection shot, and waiting to hear back from practice. Upon chart review/ messages, confirmed previous message regarding inquiry. Contacted practice/clerical, (fredy) states patient can come into office now, and get shot, Confirmed update with patient , patient states she is thankful. Please advise Patient at [phone], if any further questions.

## 2024-08-03 DIAGNOSIS — N30.90 CYSTITIS: Primary | ICD-10-CM

## 2024-08-03 RX ORDER — CIPROFLOXACIN 500 MG/1
500 TABLET, FILM COATED ORAL EVERY 12 HOURS SCHEDULED
Qty: 10 TABLET | Refills: 0 | Status: SHIPPED | OUTPATIENT
Start: 2024-08-03 | End: 2024-08-08

## 2024-09-14 DIAGNOSIS — R23.2 HOT FLASHES: ICD-10-CM

## 2024-10-14 ENCOUNTER — TELEPHONE (OUTPATIENT)
Age: 89
End: 2024-10-14

## 2024-10-14 ENCOUNTER — CLINICAL SUPPORT (OUTPATIENT)
Age: 89
End: 2024-10-14
Payer: MEDICARE

## 2024-10-14 DIAGNOSIS — Z23 ENCOUNTER FOR IMMUNIZATION: Primary | ICD-10-CM

## 2024-10-14 DIAGNOSIS — Z23 NEED FOR IMMUNIZATION AGAINST INFLUENZA: ICD-10-CM

## 2024-10-14 PROCEDURE — G0008 ADMIN INFLUENZA VIRUS VAC: HCPCS

## 2024-10-14 PROCEDURE — 90662 IIV NO PRSV INCREASED AG IM: CPT

## 2024-10-14 RX ADMIN — CYANOCOBALAMIN 1000 MCG: 1000 INJECTION, SOLUTION INTRAMUSCULAR; SUBCUTANEOUS at 11:40

## 2024-10-14 NOTE — TELEPHONE ENCOUNTER
Pt came in today for flu shot and b12 injection. Inquired if there was any paperwork she should have left office with and if vaccine she received for flu was the high dose. Advised her no paperwork and vaccine received was high dose.

## 2024-11-08 DIAGNOSIS — M19.90 ARTHRITIS: ICD-10-CM

## 2024-11-21 ENCOUNTER — RA CDI HCC (OUTPATIENT)
Dept: OTHER | Facility: HOSPITAL | Age: 89
End: 2024-11-21

## 2024-12-06 ENCOUNTER — OFFICE VISIT (OUTPATIENT)
Age: 89
End: 2024-12-06
Payer: MEDICARE

## 2024-12-06 VITALS
OXYGEN SATURATION: 97 % | HEART RATE: 90 BPM | SYSTOLIC BLOOD PRESSURE: 148 MMHG | TEMPERATURE: 98.7 F | BODY MASS INDEX: 27.6 KG/M2 | WEIGHT: 146.2 LBS | DIASTOLIC BLOOD PRESSURE: 80 MMHG | HEIGHT: 61 IN

## 2024-12-06 DIAGNOSIS — L30.9 DERMATITIS: ICD-10-CM

## 2024-12-06 DIAGNOSIS — E11.9 TYPE 2 DIABETES MELLITUS WITHOUT COMPLICATION, WITHOUT LONG-TERM CURRENT USE OF INSULIN (HCC): Primary | ICD-10-CM

## 2024-12-06 DIAGNOSIS — J01.00 ACUTE NON-RECURRENT MAXILLARY SINUSITIS: ICD-10-CM

## 2024-12-06 DIAGNOSIS — Z00.00 MEDICARE ANNUAL WELLNESS VISIT, SUBSEQUENT: ICD-10-CM

## 2024-12-06 DIAGNOSIS — Z12.31 ENCOUNTER FOR SCREENING MAMMOGRAM FOR BREAST CANCER: ICD-10-CM

## 2024-12-06 LAB
CREAT UR-MCNC: 125.8 MG/DL
MICROALBUMIN UR-MCNC: 13.8 MG/L
MICROALBUMIN/CREAT 24H UR: 11 MG/G CREATININE (ref 0–30)
SL AMB POCT HEMOGLOBIN AIC: 7.1 (ref ?–6.5)

## 2024-12-06 PROCEDURE — 99214 OFFICE O/P EST MOD 30 MIN: CPT | Performed by: FAMILY MEDICINE

## 2024-12-06 PROCEDURE — 82570 ASSAY OF URINE CREATININE: CPT | Performed by: FAMILY MEDICINE

## 2024-12-06 PROCEDURE — 83036 HEMOGLOBIN GLYCOSYLATED A1C: CPT | Performed by: FAMILY MEDICINE

## 2024-12-06 PROCEDURE — 82043 UR ALBUMIN QUANTITATIVE: CPT | Performed by: FAMILY MEDICINE

## 2024-12-06 PROCEDURE — G0439 PPPS, SUBSEQ VISIT: HCPCS | Performed by: FAMILY MEDICINE

## 2024-12-06 RX ORDER — TRIAMCINOLONE ACETONIDE 1 MG/G
CREAM TOPICAL 2 TIMES DAILY
Qty: 15 G | Refills: 2 | Status: SHIPPED | OUTPATIENT
Start: 2024-12-06

## 2024-12-06 RX ORDER — AZITHROMYCIN 250 MG/1
TABLET, FILM COATED ORAL
Qty: 6 TABLET | Refills: 0 | Status: SHIPPED | OUTPATIENT
Start: 2024-12-06 | End: 2024-12-10

## 2024-12-06 NOTE — ASSESSMENT & PLAN NOTE
Lab Results   Component Value Date    HGBA1C 7.1 (A) 12/06/2024       Orders:    POCT hemoglobin A1c    Albumin / creatinine urine ratio; Future

## 2024-12-06 NOTE — PROGRESS NOTES
Name: Trinh Vilchis      : 1935      MRN: [id number]  Encounter Provider: Jeff Hooper DO  Encounter Date: 2024   Encounter department: [facility] PRIMARY [facility]ARE    Assessment & Plan  Type 2 diabetes mellitus without complication, without long-term current use of insulin ([facility])    Lab Results   Component Value Date    HGBA1C 7.1 (A) 2024       Orders:    POCT hemoglobin A1c    Albumin / creatinine urine ratio; Future    Acute non-recurrent maxillary sinusitis    Orders:    azithromycin (ZITHROMAX) 250 mg tablet; Take 2 tablets today then 1 tablet daily x 4 days    Dermatitis    Orders:    triamcinolone (KENALOG) 0.1 % cream; Apply topically 2 (two) times a day    Medicare annual wellness visit, subsequent         Encounter for screening mammogram for breast cancer           Depression Screening and Follow-up Plan: Patient's depression screening was positive with a PHQ-2 score of 3. Continue regular follow-up with their mental health provider who is managing their mental health condition(s). Patient advised to follow-up with PCP for further management.     Urinary Incontinence Plan of Care: counseling topics discussed: limiting fluid intake 3-4 hours before bed and preventing constipation.       Preventive health issues were discussed with patient, and age appropriate screening tests were ordered as noted in patient's After Visit Summary. Personalized health advice and appropriate referrals for health education or preventive services given if needed, as noted in patient's After Visit Summary.    History of Present Illness     Patient is here for Medicare wellness exam as well as patient is having some sinus issues with nasal congestion pressure pain rhinorrhea postnasal drip over the past few days.  Patient also following up on diabetes and other chronic issues.  Patient also with possible rash on lower back.  Patient does notice itching.       Patient Care Team:  Viet Hooper DO as PCP -  General  Ermelinda Tinsley DO    Review of Systems   Constitutional: Negative.  Negative for fever.   HENT:  Positive for congestion, postnasal drip, rhinorrhea, sinus pressure and sinus pain. Negative for sore throat.    Eyes: Negative.    Respiratory: Negative.     Cardiovascular: Negative.    Gastrointestinal: Negative.    Endocrine: Negative.    Genitourinary: Negative.    Musculoskeletal: Negative.    Skin:  Positive for rash.   Allergic/Immunologic: Negative.    Neurological: Negative.    Hematological: Negative.    Psychiatric/Behavioral: Negative.       Medical History Reviewed by provider this encounter:  Tobacco  Allergies  Meds  Problems  Med Hx  Surg Hx  Fam Hx       Annual Wellness Visit Questionnaire   Trinh is here for her Subsequent Wellness visit.     Health Risk Assessment:   Patient rates overall health as good. Patient feels that their physical health rating is much better. Patient is satisfied with their life. Eyesight was rated as same. Hearing was rated as same. Patient feels that their emotional and mental health rating is much better. Patients states they are never, rarely angry. Patient states they are sometimes unusually tired/fatigued. Pain experienced in the last 7 days has been some. Patient's pain rating has been 1/10. Patient states that she has experienced no weight loss or gain in last 6 months.     Depression Screening:   PHQ-2 Score: 3      Fall Risk Screening:   In the past year, patient has experienced: no history of falling in past year      Urinary Incontinence Screening:   Patient has leaked urine accidently in the last six months.     Home Safety:  Patient has trouble with stairs inside or outside of their home. Patient has working smoke alarms and has working carbon monoxide detector. Home safety hazards include: none.     Nutrition:   Current diet is Limited junk food and No Added Salt. Watches sugar intake    Medications:   Patient is currently taking  over-the-counter supplements. OTC medications include: see medication list. Patient is able to manage medications.     Activities of Daily Living (ADLs)/Instrumental Activities of Daily Living (IADLs):   Walk and transfer into and out of bed and chair?: Yes  Dress and groom yourself?: Yes    Bathe or shower yourself?: Yes    Feed yourself? Yes  Do your laundry/housekeeping?: Yes  Manage your money, pay your bills and track your expenses?: Yes  Make your own meals?: Yes    Do your own shopping?: Yes    Previous Hospitalizations:   Any hospitalizations or ED visits within the last 12 months?: Yes    How many hospitalizations have you had in the last year?: 1-2    Advance Care Planning:   Living will: Yes    Durable POA for healthcare: Yes    Advanced directive: Yes      Cognitive Screening:   Provider or family/friend/caregiver concerned regarding cognition?: No    PREVENTIVE SCREENINGS      Cardiovascular Screening:    General: Screening Not Indicated, History Lipid Disorder and Risks and Benefits Discussed      Diabetes Screening:     General: Screening Not Indicated, History Diabetes and Risks and Benefits Discussed      Colorectal Cancer Screening:     General: Screening Not Indicated and Risks and Benefits Discussed      Breast Cancer Screening:     General: Screening Current and Risks and Benefits Discussed      Cervical Cancer Screening:    General: Screening Not Indicated and Risks and Benefits Discussed      Osteoporosis Screening:    General: Risks and Benefits Discussed and Screening Current      Abdominal Aortic Aneurysm (AAA) Screening:        General: Risks and Benefits Discussed and Screening Not Indicated      Lung Cancer Screening:     General: Screening Not Indicated and Risks and Benefits Discussed      Hepatitis C Screening:    General: Screening Current and Risks and Benefits Discussed    Other Counseling Topics:   Regular weightbearing exercise and calcium and vitamin D intake.     Social Drivers  "of Health     Food Insecurity: No Food Insecurity (12/6/2024)    Hunger Vital Sign     Worried About Running Out of Food in the Last Year: Never true     Ran Out of Food in the Last Year: Never true   Transportation Needs: No Transportation Needs (12/6/2024)    PRAPARE - Transportation     Lack of Transportation (Medical): No     Lack of Transportation (Non-Medical): No   Housing Stability: Low Risk  (12/6/2024)    Housing Stability Vital Sign     Unable to Pay for Housing in the Last Year: No     Number of Times Moved in the Last Year: 1     Homeless in the Last Year: No   Utilities: Not At Risk (12/6/2024)    [facility] Utilities     Threatened with loss of utilities: No     No results found.    Objective   /80 (BP Location: Right arm, Patient Position: Sitting, Cuff Size: Large)   Pulse 90   Temp 98.7 °F (37.1 °C) (Temporal)   Ht 5' 1\" (1.549 m)   Wt 66.3 kg (146 lb 3.2 oz)   LMP  (LMP Unknown)   SpO2 97%   BMI 27.62 kg/m²     Physical Exam  Vitals and nursing note reviewed.   Constitutional:       General: She is not in acute distress.     Appearance: Normal appearance. She is well-developed. She is not ill-appearing, toxic-appearing or diaphoretic.   HENT:      Head: Normocephalic and atraumatic.      Right Ear: Tympanic membrane, ear canal and external ear normal.      Left Ear: Tympanic membrane, ear canal and external ear normal.      Nose: Nose normal.      Mouth/Throat:      Pharynx: Oropharyngeal exudate present.   Eyes:      General:         Right eye: No discharge.         Left eye: No discharge.   Neck:      Thyroid: No thyromegaly.      Vascular: No carotid bruit.      Trachea: No tracheal deviation.   Cardiovascular:      Rate and Rhythm: Normal rate and regular rhythm.      Heart sounds: Normal heart sounds. No murmur heard.     No gallop.   Pulmonary:      Effort: Pulmonary effort is normal. No respiratory distress.      Breath sounds: Normal breath sounds. No stridor. No wheezing or rales. "   Chest:      Chest wall: No tenderness.   Musculoskeletal:         General: No tenderness or deformity. Normal range of motion.      Cervical back: Normal range of motion and neck supple.   Lymphadenopathy:      Cervical: No cervical adenopathy.   Skin:     General: Skin is warm and dry.      Coloration: Skin is not pale.      Findings: Rash present. No erythema.      Comments: Eczema bilateral lumbar region   Neurological:      Mental Status: She is alert and oriented to person, place, and time.      Cranial Nerves: No cranial nerve deficit.      Motor: No abnormal muscle tone.      Coordination: Coordination normal.      Gait: Gait abnormal.      Deep Tendon Reflexes: Reflexes normal.   Psychiatric:         Mood and Affect: Mood normal.         Behavior: Behavior normal.         Thought Content: Thought content normal.         Judgment: Judgment normal.

## 2024-12-06 NOTE — PATIENT INSTRUCTIONS
Medicare Preventive Visit Patient Instructions  Thank you for completing your Welcome to Medicare Visit or Medicare Annual Wellness Visit today. Your next wellness visit will be due in one year (12/7/2025).  The screening/preventive services that you may require over the next 5-10 years are detailed below. Some tests may not apply to you based off risk factors and/or age. Screening tests ordered at today's visit but not completed yet may show as past due. Also, please note that scanned in results may not display below.  Preventive Screenings:  Service Recommendations Previous Testing/Comments   Colorectal Cancer Screening  * Colonoscopy    * Fecal Occult Blood Test (FOBT)/Fecal Immunochemical Test (FIT)  * Fecal DNA/Cologuard Test  * Flexible Sigmoidoscopy Age: 45-75 years old   Colonoscopy: every 10 years (may be performed more frequently if at higher risk)  OR  FOBT/FIT: every 1 year  OR  Cologuard: every 3 years  OR  Sigmoidoscopy: every 5 years  Screening may be recommended earlier than age 45 if at higher risk for colorectal cancer. Also, an individualized decision between you and your healthcare provider will decide whether screening between the ages of 76-85 would be appropriate. Colonoscopy: Not on file  FOBT/FIT: Not on file  Cologuard: Not on file  Sigmoidoscopy: Not on file    Screening Not Indicated  Risks and Benefits Discussed     Breast Cancer Screening Age: 40+ years old  Frequency: every 1-2 years  Not required if history of left and right mastectomy Mammogram: 12/27/2023    Screening Current  Risks and Benefits Discussed   Cervical Cancer Screening Between the ages of 21-29, pap smear recommended once every 3 years.   Between the ages of 30-65, can perform pap smear with HPV co-testing every 5 years.   Recommendations may differ for women with a history of total hysterectomy, cervical cancer, or abnormal pap smears in past. Pap Smear: Not on file    Screening Not Indicated  Risks and Benefits  Discussed   Hepatitis C Screening Once for adults born between 1945 and 1965  More frequently in patients at high risk for Hepatitis C Hep C Antibody: 04/15/2024    Screening Current  Risks and Benefits Discussed   Diabetes Screening 1-2 times per year if you're at risk for diabetes or have pre-diabetes Fasting glucose: 157 mg/dL (4/15/2024)  A1C: 7.1 (12/6/2024)  Screening Not Indicated  History Diabetes  Risks and Benefits Discussed   Cholesterol Screening Once every 5 years if you don't have a lipid disorder. May order more often based on risk factors. Lipid panel: 04/15/2024    Screening Not Indicated  History Lipid Disorder  Risks and Benefits Discussed     Other Preventive Screenings Covered by Medicare:  Abdominal Aortic Aneurysm (AAA) Screening: covered once if your at risk. You're considered to be at risk if you have a family history of AAA.  Lung Cancer Screening: covers low dose CT scan once per year if you meet all of the following conditions: (1) Age 55-77; (2) No signs or symptoms of lung cancer; (3) Current smoker or have quit smoking within the last 15 years; (4) You have a tobacco smoking history of at least 20 pack years (packs per day multiplied by number of years you smoked); (5) You get a written order from a healthcare provider.  Glaucoma Screening: covered annually if you're considered high risk: (1) You have diabetes OR (2) Family history of glaucoma OR (3)  aged 50 and older OR (4)  American aged 65 and older  Osteoporosis Screening: covered every 2 years if you meet one of the following conditions: (1) You're estrogen deficient and at risk for osteoporosis based off medical history and other findings; (2) Have a vertebral abnormality; (3) On glucocorticoid therapy for more than 3 months; (4) Have primary hyperparathyroidism; (5) On osteoporosis medications and need to assess response to drug therapy.   Last bone density test (DXA Scan): 07/20/2022.  HIV Screening:  covered annually if you're between the age of 15-65. Also covered annually if you are younger than 15 and older than 65 with risk factors for HIV infection. For pregnant patients, it is covered up to 3 times per pregnancy.    Immunizations:  Immunization Recommendations   Influenza Vaccine Annual influenza vaccination during flu season is recommended for all persons aged >= 6 months who do not have contraindications   Pneumococcal Vaccine   * Pneumococcal conjugate vaccine = PCV13 (Prevnar 13), PCV15 (Vaxneuvance), PCV20 (Prevnar 20)  * Pneumococcal polysaccharide vaccine = PPSV23 (Pneumovax) Adults 19-65 yo with certain risk factors or if 65+ yo  If never received any pneumonia vaccine: recommend Prevnar 20 (PCV20)  Give PCV20 if previously received 1 dose of PCV13 or PPSV23   Hepatitis B Vaccine 3 dose series if at intermediate or high risk (ex: diabetes, end stage renal disease, liver disease)   Respiratory syncytial virus (RSV) Vaccine - COVERED BY MEDICARE PART D  * RSVPreF3 (Arexvy) CDC recommends that adults 60 years of age and older may receive a single dose of RSV vaccine using shared clinical decision-making (SCDM)   Tetanus (Td) Vaccine - COST NOT COVERED BY MEDICARE PART B Following completion of primary series, a booster dose should be given every 10 years to maintain immunity against tetanus. Td may also be given as tetanus wound prophylaxis.   Tdap Vaccine - COST NOT COVERED BY MEDICARE PART B Recommended at least once for all adults. For pregnant patients, recommended with each pregnancy.   Shingles Vaccine (Shingrix) - COST NOT COVERED BY MEDICARE PART B  2 shot series recommended in those 19 years and older who have or will have weakened immune systems or those 50 years and older     Health Maintenance Due:      Topic Date Due   • Breast Cancer Screening: Mammogram  12/27/2024   • Hepatitis C Screening  Completed     Immunizations Due:  There are no preventive care reminders to display for this  patient.  Advance Directives   What are advance directives?  Advance directives are legal documents that state your wishes and plans for medical care. These plans are made ahead of time in case you lose your ability to make decisions for yourself. Advance directives can apply to any medical decision, such as the treatments you want, and if you want to donate organs.   What are the types of advance directives?  There are many types of advance directives, and each state has rules about how to use them. You may choose a combination of any of the following:  Living will:  This is a written record of the treatment you want. You can also choose which treatments you do not want, which to limit, and which to stop at a certain time. This includes surgery, medicine, IV fluid, and tube feedings.   Durable power of  for healthcare (DPAHC):  This is a written record that states who you want to make healthcare choices for you when you are unable to make them for yourself. This person, called a proxy, is usually a family member or a friend. You may choose more than 1 proxy.  Do not resuscitate (DNR) order:  A DNR order is used in case your heart stops beating or you stop breathing. It is a request not to have certain forms of treatment, such as CPR. A DNR order may be included in other types of advance directives.  Medical directive:  This covers the care that you want if you are in a coma, near death, or unable to make decisions for yourself. You can list the treatments you want for each condition. Treatment may include pain medicine, surgery, blood transfusions, dialysis, IV or tube feedings, and a ventilator (breathing machine).  Values history:  This document has questions about your views, beliefs, and how you feel and think about life. This information can help others choose the care that you would choose.  Why are advance directives important?  An advance directive helps you control your care. Although spoken wishes  may be used, it is better to have your wishes written down. Spoken wishes can be misunderstood, or not followed. Treatments may be given even if you do not want them. An advance directive may make it easier for your family to make difficult choices about your care.   Depression   Depression  is a medical condition that causes feelings of sadness or hopelessness that do not go away. Depression may cause you to lose interest in things you used to enjoy. These feelings may interfere with your daily life.  Call your local emergency number (911 in the ) if:   You think about harming yourself or someone else.  You have done something on purpose to hurt yourself.  The following resources are available at any time to help you, if needed:   National Suicide Prevention Lifeline: 1-773.510.2874 (3-585-504-TALK)   Suicide Hotline: 1-797.228.9892 (1-968-FGNRGMM)   For a list of international numbers: https://"[employer]org/find-help/international-resources/  Treatment for depression may include medicine to relieve depression. Medicine is often used together with therapy. Therapy is a way for you to talk about your feelings and anything that may be causing depression. Therapy can be done alone or in a group. It may also be done with family members or a significant other.  Get regular physical activity.    Create a regular sleep schedule.    Eat a variety of healthy foods.    Do not drink alcohol or use drugs.     Urinary Incontinence   Urinary incontinence (UI)  is when you lose control of your bladder. UI develops because your bladder cannot store or empty urine properly. The 3 most common types of UI are stress incontinence, urge incontinence, or both.  Medicines:   May be given to help strengthen your bladder control. Report any side effects of medication to your healthcare provider.  Do pelvic muscle exercises often:  Your pelvic muscles help you stop urinating. Squeeze these muscles tight for 5 seconds, then relax for 5 seconds.  Gradually work up to squeezing for 10 seconds. Do 3 sets of 15 repetitions a day, or as directed. This will help strengthen your pelvic muscles and improve bladder control.  Train your bladder:  Go to the bathroom at set times, such as every 2 hours, even if you do not feel the urge to go. You can also try to hold your urine when you feel the urge to go. For example, hold your urine for 5 minutes when you feel the urge to go. As that becomes easier, hold your urine for 10 minutes.   Self-care:   Keep a UI record.  Write down how often you leak urine and how much you leak. Make a note of what you were doing when you leaked urine.  Drink liquids as directed. You may need to limit the amount of liquid you drink to help control your urine leakage. Do not drink any liquid right before you go to bed. Limit or do not have drinks that contain caffeine or alcohol.   Prevent constipation.  Eat a variety of high-fiber foods. Good examples are high-fiber cereals, beans, vegetables, and whole-grain breads. Walking is the best way to trigger your intestines to have a bowel movement.  Exercise regularly and maintain a healthy weight.  Weight loss and exercise will decrease pressure on your bladder and help you control your leakage.   Use a catheter as directed  to help empty your bladder. A catheter is a tiny, plastic tube that is put into your bladder to drain your urine.   Go to behavior therapy as directed.  Behavior therapy may be used to help you learn to control your urge to urinate.    Weight Management   Why it is important to manage your weight:  Being overweight increases your risk of health conditions such as heart disease, high blood pressure, type 2 diabetes, and certain types of cancer. It can also increase your risk for osteoarthritis, sleep apnea, and other respiratory problems. Aim for a slow, steady weight loss. Even a small amount of weight loss can lower your risk of health problems.  How to lose weight safely:   A safe and healthy way to lose weight is to eat fewer calories and get regular exercise. You can lose up about 1 pound a week by decreasing the number of calories you eat by 500 calories each day.   Healthy meal plan for weight management:  A healthy meal plan includes a variety of foods, contains fewer calories, and helps you stay healthy. A healthy meal plan includes the following:  Eat whole-grain foods more often.  A healthy meal plan should contain fiber. Fiber is the part of grains, fruits, and vegetables that is not broken down by your body. Whole-grain foods are healthy and provide extra fiber in your diet. Some examples of whole-grain foods are whole-wheat breads and pastas, oatmeal, brown rice, and bulgur.  Eat a variety of vegetables every day.  Include dark, leafy greens such as spinach, kale, russell greens, and mustard greens. Eat yellow and orange vegetables such as carrots, sweet potatoes, and winter squash.   Eat a variety of fruits every day.  Choose fresh or canned fruit (canned in its own juice or light syrup) instead of juice. Fruit juice has very little or no fiber.  Eat low-fat dairy foods.  Drink fat-free (skim) milk or 1% milk. Eat fat-free yogurt and low-fat cottage cheese. Try low-fat cheeses such as mozzarella and other reduced-fat cheeses.  Choose meat and other protein foods that are low in fat.  Choose beans or other legumes such as split peas or lentils. Choose fish, skinless poultry (chicken or turkey), or lean cuts of red meat (beef or pork). Before you cook meat or poultry, cut off any visible fat.   Use less fat and oil.  Try baking foods instead of frying them. Add less fat, such as margarine, sour cream, regular salad dressing and mayonnaise to foods. Eat fewer high-fat foods. Some examples of high-fat foods include french fries, doughnuts, ice cream, and cakes.  Eat fewer sweets.  Limit foods and drinks that are high in sugar. This includes candy, cookies, regular soda, and  sweetened drinks.  Exercise:  Exercise at least 30 minutes per day on most days of the week. Some examples of exercise include walking, biking, dancing, and swimming. You can also fit in more physical activity by taking the stairs instead of the elevator or parking farther away from stores. Ask your healthcare provider about the best exercise plan for you.      © Copyright [employer] 2018 Information is for End User's use only and may not be sold, redistributed or otherwise used for commercial purposes. All illustrations and images included in CareNotes® are the copyrighted property of [employer] or [employer]

## 2025-01-14 ENCOUNTER — OFFICE VISIT (OUTPATIENT)
Age: 89
End: 2025-01-14
Payer: MEDICARE

## 2025-01-14 ENCOUNTER — TELEPHONE (OUTPATIENT)
Age: 89
End: 2025-01-14

## 2025-01-14 VITALS
BODY MASS INDEX: 27.38 KG/M2 | HEART RATE: 88 BPM | WEIGHT: 145 LBS | SYSTOLIC BLOOD PRESSURE: 130 MMHG | DIASTOLIC BLOOD PRESSURE: 80 MMHG | TEMPERATURE: 97.5 F | OXYGEN SATURATION: 96 % | HEIGHT: 61 IN

## 2025-01-14 DIAGNOSIS — J01.00 ACUTE NON-RECURRENT MAXILLARY SINUSITIS: Primary | ICD-10-CM

## 2025-01-14 DIAGNOSIS — H34.8122 CENTRAL RETINAL VEIN OCCLUSION OF LEFT EYE, UNSPECIFIED COMPLICATION STATUS: ICD-10-CM

## 2025-01-14 DIAGNOSIS — E11.9 TYPE 2 DIABETES MELLITUS WITHOUT COMPLICATION, WITHOUT LONG-TERM CURRENT USE OF INSULIN (HCC): ICD-10-CM

## 2025-01-14 PROCEDURE — 99213 OFFICE O/P EST LOW 20 MIN: CPT | Performed by: FAMILY MEDICINE

## 2025-01-14 PROCEDURE — 96372 THER/PROPH/DIAG INJ SC/IM: CPT | Performed by: FAMILY MEDICINE

## 2025-01-14 RX ORDER — AZITHROMYCIN 250 MG/1
TABLET, FILM COATED ORAL
Qty: 6 TABLET | Refills: 0 | Status: SHIPPED | OUTPATIENT
Start: 2025-01-14 | End: 2025-01-18

## 2025-01-14 RX ADMIN — CYANOCOBALAMIN 1000 MCG: 1000 INJECTION, SOLUTION INTRAMUSCULAR; SUBCUTANEOUS at 16:23

## 2025-01-14 NOTE — TELEPHONE ENCOUNTER
Pt called, has cold symptoms, sinus pressure/pain, dry cough, and is getting nose bleeds. Unsure if she has temp.  Pt is concerned because last year she had similar symptoms,  ended up with menengitis.    Pt's friend has appointment this afternoon at 3 pm,  Pt would like to come with her, also see Dr Hooper. Declined to schedule with other provider.    No availability at this time, warm TSF to Giovani Hanson went unanswered.    Pt is kindly requesting a return call.  Will only see Dr. Hooper.  Can she come in today 1/14 at 3 pm to see PCP?  Patient c/b# [phone].

## 2025-01-14 NOTE — PROGRESS NOTES
"Assessment/Plan:       There are no diagnoses linked to this encounter.        Subjective:        Patient ID: Trinh Vilchsi is a 89 y.o. female.      Patient with some sore throat and postnasal drip.  Blood with blowing nose.  Patient is ears feel blocked at this time.  No vomiting or diarrhea.  No medications being used at this time.        The following portions of the patient's history were reviewed and updated as appropriate: allergies, current medications, past family history, past medical history, past social history, past surgical history and problem list.      Review of Systems   Constitutional: Negative.  Negative for fever.   HENT:  Positive for congestion, ear pain, postnasal drip, rhinorrhea and sore throat.    Eyes: Negative.    Respiratory: Negative.  Negative for cough.    Cardiovascular: Negative.    Gastrointestinal: Negative.    Endocrine: Negative.    Genitourinary: Negative.    Musculoskeletal: Negative.    Skin: Negative.    Allergic/Immunologic: Negative.    Neurological: Negative.    Hematological: Negative.    Psychiatric/Behavioral: Negative.           Objective:               /80 (BP Location: Right arm, Patient Position: Sitting, Cuff Size: Standard)   Pulse 88   Temp 97.5 °F (36.4 °C)   Ht 5' 1\" (1.549 m)   Wt 65.8 kg (145 lb)   LMP  (LMP Unknown)   SpO2 96%   BMI 27.40 kg/m²          Physical Exam  Vitals and nursing note reviewed.   Constitutional:       General: She is not in acute distress.     Appearance: She is ill-appearing. She is not toxic-appearing or diaphoretic.   HENT:      Head: Normocephalic and atraumatic.      Right Ear: Tympanic membrane, ear canal and external ear normal. There is no impacted cerumen.      Left Ear: Tympanic membrane, ear canal and external ear normal. There is no impacted cerumen.      Nose: Nose normal. No congestion or rhinorrhea.      Mouth/Throat:      Mouth: Mucous membranes are moist.      Pharynx: Oropharyngeal exudate and " posterior oropharyngeal erythema present.   Eyes:      General: No scleral icterus.        Right eye: No discharge.         Left eye: No discharge.   Neck:      Vascular: No carotid bruit.   Cardiovascular:      Rate and Rhythm: Normal rate and regular rhythm.      Pulses: Normal pulses.      Heart sounds: Normal heart sounds. No murmur heard.     No friction rub. No gallop.   Pulmonary:      Effort: Pulmonary effort is normal. No respiratory distress.      Breath sounds: Normal breath sounds. No stridor. No wheezing, rhonchi or rales.   Chest:      Chest wall: No tenderness.   Musculoskeletal:         General: No swelling, tenderness, deformity or signs of injury. Normal range of motion.      Cervical back: Normal range of motion and neck supple. No rigidity. No muscular tenderness.      Right lower leg: No edema.      Left lower leg: No edema.   Lymphadenopathy:      Cervical: No cervical adenopathy.   Skin:     General: Skin is warm and dry.      Capillary Refill: Capillary refill takes less than 2 seconds.      Coloration: Skin is not jaundiced.      Findings: No bruising, erythema, lesion or rash.   Neurological:      Mental Status: She is alert and oriented to person, place, and time. Mental status is at baseline.      Cranial Nerves: No cranial nerve deficit.      Sensory: No sensory deficit.      Motor: No weakness.      Coordination: Coordination normal.      Gait: Gait normal.   Psychiatric:         Mood and Affect: Mood normal.         Behavior: Behavior normal.         Thought Content: Thought content normal.         Judgment: Judgment normal.

## 2025-02-01 DIAGNOSIS — R79.89 ABNORMAL TSH: ICD-10-CM

## 2025-02-01 DIAGNOSIS — N95.2 VAGINAL ATROPHY: ICD-10-CM

## 2025-02-01 RX ORDER — LEVOTHYROXINE SODIUM 25 MCG
25 TABLET ORAL DAILY
Qty: 90 TABLET | Refills: 1 | Status: SHIPPED | OUTPATIENT
Start: 2025-02-01

## 2025-02-02 DIAGNOSIS — I10 ESSENTIAL HYPERTENSION: ICD-10-CM

## 2025-02-03 RX ORDER — METOPROLOL SUCCINATE 50 MG
50 TABLET, EXTENDED RELEASE 24 HR ORAL DAILY
Qty: 90 TABLET | Refills: 1 | Status: SHIPPED | OUTPATIENT
Start: 2025-02-03

## 2025-02-04 RX ORDER — ESTRADIOL 0.1 MG/G
0.5 CREAM VAGINAL 2 TIMES WEEKLY
Qty: 42.5 G | Refills: 2 | Status: SHIPPED | OUTPATIENT
Start: 2025-02-06

## 2025-02-12 DIAGNOSIS — I10 ESSENTIAL HYPERTENSION: ICD-10-CM

## 2025-02-12 DIAGNOSIS — E78.2 MIXED HYPERLIPIDEMIA: ICD-10-CM

## 2025-02-13 RX ORDER — AMLODIPINE BESYLATE 5 MG
5 TABLET ORAL DAILY
Qty: 90 TABLET | Refills: 1 | Status: SHIPPED | OUTPATIENT
Start: 2025-02-13

## 2025-02-13 RX ORDER — EZETIMIBE/SIMVASTATIN 10 MG-20MG
TABLET ORAL
Qty: 90 TABLET | Refills: 1 | Status: SHIPPED | OUTPATIENT
Start: 2025-02-13

## 2025-02-19 DIAGNOSIS — R23.2 HOT FLASHES: ICD-10-CM

## 2025-02-26 ENCOUNTER — TELEPHONE (OUTPATIENT)
Age: 89
End: 2025-02-26

## 2025-02-26 DIAGNOSIS — M17.0 PRIMARY OSTEOARTHRITIS OF BOTH KNEES: ICD-10-CM

## 2025-02-26 DIAGNOSIS — M19.90 ARTHRITIS: Primary | ICD-10-CM

## 2025-02-26 NOTE — TELEPHONE ENCOUNTER
I saw a letter in Trinh's chart.  Stated that she needed a Dx for the Celebrex.  I don't know how to help her.  Can you guide me or help her? Thanks

## 2025-02-26 NOTE — TELEPHONE ENCOUNTER
Auth needed for Celebrex 200mg (brand). Take 1 daily. Dispense qty 90 with 1 refill. Dx: M19.90, M17.0.     Stable = prior to 2013. T/F: celecoxib, Nsaids  (Detail of nsaids noted in ortho note scanned under media 2/11/13)

## 2025-02-26 NOTE — TELEPHONE ENCOUNTER
Patient called, states she received notification from [employer]. Letter states the coverage of CeleBREX 200 MG capsule [[id number]] is denied , and not covered on unde Medicare Part B. Patient is concerned for all of her medication coverage Iin the near future. Assumes [employer], has notified Dr. Hooper of the coverage information. Patient expresses worry and request  a callback with suggestions.Please advise Patient at [phone], if any further questions.

## 2025-02-27 NOTE — TELEPHONE ENCOUNTER
Pt not out of pills yet, but would like to get a jump on the authorization. She is still familiarizing herself with the process as she never required auth before but does now with her new [employer].     Pt is willing to try generic celebrex if it means the med will be covered and she can get it sooner, but Dr. Hooper had her specifically on BRAND because of all her allergies to medicines so it may not even be recommended. Pt would please like a call back to know if the brand celebrex has been approved by the ins    Reason for call:   [x] Prior Auth  [] Other:     Caller:  [x] Patient  [] Pharmacy  Name:   Address:   Callback Number:     Medication:   CeleBREX 200 MG capsule     Dose/Frequency: TAKE ONE CAPSULE BY MOUTH DAILY WITH A MEAL     Quantity: 90 caps    Ordering Provider:   [x] PCP/Provider -   [] Speciality/Provider -     Has the patient tried other medications and failed? If failed, which medications did they fail?    [] No   [] Yes -     Is the patient's insurance updated in EPIC?   [x] Yes   [] No     Is a copy of the patient's insurance scanned in EPIC?   [x] Yes   [] No

## 2025-02-27 NOTE — TELEPHONE ENCOUNTER
PA for Celebrex 200 mg SUBMITTED to [employer]     via    []CMM-KEY:   []Surescripts-Case ID #   [x]Availity-Auth ID # [id number] NDC # [id number]  []Faxed to plan   []Other website   []Phone call Case ID #     []PA sent as URGENT    All office notes, labs and other pertaining documents and studies sent. Clinical questions answered. Awaiting determination from insurance company.     Turnaround time for your insurance to make a decision on your Prior Authorization can take 7-21 business days.

## 2025-03-03 RX ORDER — CELECOXIB 200 MG/1
200 CAPSULE ORAL DAILY
Qty: 30 CAPSULE | Refills: 2 | Status: SHIPPED | OUTPATIENT
Start: 2025-03-03

## 2025-03-03 NOTE — TELEPHONE ENCOUNTER
Insurance denied the brand celebrex. Patient willing to try celecoxib (again) and/or another nsaid per above notes.   It is documented in an ortho note from 2013 she has tried these alternatives but that trial period is too old.

## 2025-03-03 NOTE — TELEPHONE ENCOUNTER
PA for Celebrex  DENIED    Reason:(Screenshot if applicable)  Please see denial letter scanned on 2/18, this is a formulary drug, but the pt's insurance company will only pay if other drugs have been tried, as well as step therapy.      Message sent to office clinical pool Yes    Denial letter scanned into Media Yes    Appeal started No (Provider will need to decide if appeal is warranted and send clinical documentation to Prior Authorization Team for initiation.)    **Please follow up with your patient regarding denial and next steps**

## 2025-03-19 DIAGNOSIS — M19.90 ARTHRITIS: ICD-10-CM

## 2025-03-19 DIAGNOSIS — M17.0 PRIMARY OSTEOARTHRITIS OF BOTH KNEES: ICD-10-CM

## 2025-03-19 RX ORDER — CELECOXIB 200 MG/1
200 CAPSULE ORAL DAILY
Qty: 30 CAPSULE | Refills: 2 | Status: SHIPPED | OUTPATIENT
Start: 2025-03-19

## 2025-03-19 NOTE — TELEPHONE ENCOUNTER
Patient is requesting to have a script for BRAND celebrex sent in. She is too nervous to try the generic due to all of her allergies she gets from medication changes.     She spoke with her insurance. A second appeal can be initiated (with your ok) since the first was denied and it must indicate she has several allergies to medications especially when changed, she has tried all OTC nsaids prior to celebrex use, she has gone through several therapies along with obtained injections and used topical braces. She is also now using a cane for ambulation.   The celebrex has been very effective in controlling her pain and helping her be able to accomplish all her ADL.     **also reference encounter from 2/26/25 for information as well**

## 2025-03-20 ENCOUNTER — TELEPHONE (OUTPATIENT)
Age: 89
End: 2025-03-20

## 2025-03-20 NOTE — TELEPHONE ENCOUNTER
Auth needed for Celebrex 200mg (brand). Take 1 daily. Dispense qty 30 with 2 refill. Dx: M19.90, M17.0.      Stable = prior to 2013.   T/F: celecoxib, all OTC Nsaids, several therpies including injections, braces, physical therapy  Uses cane for ambulation  Has significant history of allergies and side effects to medication changes.   This medication has significantly helped control her pain and help her be able to accomplish her ADL's.    **also reference refill note from 3/19 and 2/26 as well for submitting info to insurance**  **May need 2nd step appeal per patient**    CMM Key: [id number]  Prescribed by Dr Hooper

## 2025-03-24 NOTE — TELEPHONE ENCOUNTER
Received call from [employer]. They have received the appeal request. There is a 72 hr deadline. If there are additional clinical records that need to be sent, please fax to  [phone].

## 2025-04-10 ENCOUNTER — OFFICE VISIT (OUTPATIENT)
Age: 89
End: 2025-04-10
Payer: COMMERCIAL

## 2025-04-10 VITALS
OXYGEN SATURATION: 99 % | WEIGHT: 142 LBS | HEIGHT: 61 IN | SYSTOLIC BLOOD PRESSURE: 130 MMHG | BODY MASS INDEX: 26.81 KG/M2 | DIASTOLIC BLOOD PRESSURE: 78 MMHG | HEART RATE: 75 BPM | TEMPERATURE: 98.5 F

## 2025-04-10 DIAGNOSIS — I10 PRIMARY HYPERTENSION: ICD-10-CM

## 2025-04-10 DIAGNOSIS — E78.2 MIXED HYPERLIPIDEMIA: ICD-10-CM

## 2025-04-10 DIAGNOSIS — R23.2 HOT FLASHES: ICD-10-CM

## 2025-04-10 DIAGNOSIS — E11.9 TYPE 2 DIABETES MELLITUS WITHOUT COMPLICATION, WITHOUT LONG-TERM CURRENT USE OF INSULIN (HCC): Primary | ICD-10-CM

## 2025-04-10 DIAGNOSIS — E06.3 HYPOTHYROIDISM DUE TO HASHIMOTO'S THYROIDITIS: ICD-10-CM

## 2025-04-10 DIAGNOSIS — I10 ESSENTIAL HYPERTENSION: ICD-10-CM

## 2025-04-10 LAB — SL AMB POCT HEMOGLOBIN AIC: 7.3 (ref ?–6.5)

## 2025-04-10 PROCEDURE — G2211 COMPLEX E/M VISIT ADD ON: HCPCS | Performed by: FAMILY MEDICINE

## 2025-04-10 PROCEDURE — 99214 OFFICE O/P EST MOD 30 MIN: CPT | Performed by: FAMILY MEDICINE

## 2025-04-10 PROCEDURE — 83036 HEMOGLOBIN GLYCOSYLATED A1C: CPT | Performed by: FAMILY MEDICINE

## 2025-04-10 RX ORDER — VALSARTAN AND HYDROCHLOROTHIAZIDE 160; 12.5 MG/1; MG/1
1 TABLET, FILM COATED ORAL DAILY
Qty: 90 TABLET | Refills: 1 | Status: SHIPPED | OUTPATIENT
Start: 2025-04-10

## 2025-04-10 RX ORDER — AMLODIPINE BESYLATE 5 MG
5 TABLET ORAL DAILY
Qty: 90 TABLET | Refills: 1 | Status: SHIPPED | OUTPATIENT
Start: 2025-04-10

## 2025-04-10 RX ORDER — METOPROLOL SUCCINATE 50 MG
50 TABLET, EXTENDED RELEASE 24 HR ORAL DAILY
Qty: 90 TABLET | Refills: 1 | Status: SHIPPED | OUTPATIENT
Start: 2025-04-10

## 2025-04-10 NOTE — ASSESSMENT & PLAN NOTE
Last LDL 44.  Continue with current treatment.  Refills will be given when needed.    Orders:    CBC and differential; Future    Comprehensive metabolic panel; Future    Hemoglobin A1C; Future    Lipid panel; Future    TSH, 3rd generation with Free T4 reflex; Future

## 2025-04-10 NOTE — ASSESSMENT & PLAN NOTE
Lab Results   Component Value Date    HGBA1C 7.3 (A) 04/10/2025   Patient modify diet closer at this time.      Orders:    POCT hemoglobin A1c    CBC and differential; Future    Comprehensive metabolic panel; Future    Hemoglobin A1C; Future    Lipid panel; Future    TSH, 3rd generation with Free T4 reflex; Future

## 2025-04-10 NOTE — ASSESSMENT & PLAN NOTE
Stable continue with current treatment.  Refills given.    Orders:    CBC and differential; Future    Comprehensive metabolic panel; Future    Hemoglobin A1C; Future    Lipid panel; Future    TSH, 3rd generation with Free T4 reflex; Future

## 2025-04-10 NOTE — PROGRESS NOTES
Name: Trinh Vilchis      : 1935      MRN: [id number]  Encounter Provider: Jeff Hooper DO  Encounter Date: 4/10/2025   Encounter department: [facility]  :  Assessment & Plan  Type 2 diabetes mellitus without complication, without long-term current use of insulin (HCC)    Lab Results   Component Value Date    HGBA1C 7.3 (A) 04/10/2025   Patient modify diet closer at this time.      Orders:    POCT hemoglobin A1c    CBC and differential; Future    Comprehensive metabolic panel; Future    Hemoglobin A1C; Future    Lipid panel; Future    TSH, 3rd generation with Free T4 reflex; Future    Essential hypertension    Orders:    Toprol XL 50 MG 24 hr tablet; Take 1 tablet (50 mg total) by mouth daily    Diovan -12.5 MG per tablet; Take 1 tablet by mouth daily    Norvasc 5 MG tablet; Take 1 tablet (5 mg total) by mouth daily    CBC and differential; Future    Comprehensive metabolic panel; Future    Hemoglobin A1C; Future    Lipid panel; Future    TSH, 3rd generation with Free T4 reflex; Future    Hot flashes    Orders:    conjugated estrogens (Premarin) 0.3 mg tablet; TAKE 1 TABLET BY MOUTH DAILY. NONE ON WEEKENDS    Primary hypertension  Stable continue with current treatment.  Refills given.    Orders:    CBC and differential; Future    Comprehensive metabolic panel; Future    Hemoglobin A1C; Future    Lipid panel; Future    TSH, 3rd generation with Free T4 reflex; Future    Hypothyroidism due to Hashimoto's thyroiditis  Labs reviewed.  TSH stable.  Check labs.  Continue current treatment.    Orders:    CBC and differential; Future    Comprehensive metabolic panel; Future    Hemoglobin A1C; Future    Lipid panel; Future    TSH, 3rd generation with Free T4 reflex; Future    Mixed hyperlipidemia  Last LDL 44.  Continue with current treatment.  Refills will be given when needed.    Orders:    CBC and differential; Future    Comprehensive metabolic panel; Future    Hemoglobin A1C;  "Future    Lipid panel; Future    TSH, 3rd generation with Free T4 reflex; Future          Depression Screening and Follow-up Plan: Patient was screened for depression during today's encounter. They screened negative with a PHQ-2 score of 0.        History of Present Illness   Patient is here to follow-up on diabetes hypertension hyperlipidemia.  Patient relatively normal state of health overall.  Patient continues with follow-up with ophthalmology given chronic visual disturbance.  Patient is using drops appropriately.  No new chest pain shortness of breath or difficulty with urination or defecation.  Patient with some nocturia.  Patient with some lower extremity edema intermittently.  This fluctuates.    Diabetes      Review of Systems   Constitutional: Negative.    HENT: Negative.     Eyes:  Positive for visual disturbance.   Respiratory: Negative.     Cardiovascular: Negative.    Gastrointestinal: Negative.    Endocrine: Negative.    Genitourinary: Negative.    Musculoskeletal: Negative.    Skin: Negative.    Allergic/Immunologic: Negative.    Neurological: Negative.    Hematological: Negative.    Psychiatric/Behavioral: Negative.         Objective   /78 (BP Location: Right arm, Patient Position: Sitting, Cuff Size: Standard)   Pulse 75   Temp 98.5 °F (36.9 °C) (Temporal)   Ht 5' 1\" (1.549 m)   Wt 64.4 kg (142 lb)   LMP  (LMP Unknown)   SpO2 99%   BMI 26.83 kg/m²      Physical Exam  Vitals and nursing note reviewed.   Constitutional:       General: She is not in acute distress.     Appearance: Normal appearance. She is well-developed. She is not ill-appearing, toxic-appearing or diaphoretic.   HENT:      Head: Normocephalic and atraumatic.      Right Ear: Tympanic membrane, ear canal and external ear normal.      Left Ear: Tympanic membrane, ear canal and external ear normal.      Nose: Nose normal. No congestion or rhinorrhea.   Eyes:      General:         Right eye: No discharge.         Left eye: " No discharge.   Neck:      Thyroid: No thyromegaly.      Vascular: No carotid bruit.      Trachea: No tracheal deviation.   Cardiovascular:      Rate and Rhythm: Normal rate and regular rhythm.      Pulses: Normal pulses.      Heart sounds: Normal heart sounds. No murmur heard.     No gallop.   Pulmonary:      Effort: Pulmonary effort is normal. No respiratory distress.      Breath sounds: Normal breath sounds. No stridor. No wheezing or rales.   Chest:      Chest wall: No tenderness.   Musculoskeletal:         General: No tenderness or deformity. Normal range of motion.      Cervical back: Normal range of motion and neck supple.      Right lower leg: Edema present.      Left lower leg: Edema present.   Lymphadenopathy:      Cervical: No cervical adenopathy.   Skin:     General: Skin is warm and dry.      Capillary Refill: Capillary refill takes less than 2 seconds.      Coloration: Skin is not pale.      Findings: No erythema, lesion or rash.   Neurological:      Mental Status: She is alert and oriented to person, place, and time.      Cranial Nerves: No cranial nerve deficit.      Motor: No abnormal muscle tone.      Coordination: Coordination normal.      Gait: Gait abnormal.      Deep Tendon Reflexes: Reflexes normal.   Psychiatric:         Mood and Affect: Mood normal.         Behavior: Behavior normal.         Thought Content: Thought content normal.         Judgment: Judgment normal.

## 2025-04-10 NOTE — ASSESSMENT & PLAN NOTE
Labs reviewed.  TSH stable.  Check labs.  Continue current treatment.    Orders:    CBC and differential; Future    Comprehensive metabolic panel; Future    Hemoglobin A1C; Future    Lipid panel; Future    TSH, 3rd generation with Free T4 reflex; Future

## 2025-05-02 ENCOUNTER — TELEPHONE (OUTPATIENT)
Age: 89
End: 2025-05-02

## 2025-05-02 NOTE — TELEPHONE ENCOUNTER
Patient called stating Dr Hooper had filled out a form for a handicap placard for her but it was returned from the state for not having his license number on it , asking if she brought the form in would someone be able to write it on for her . Per Nani in office, patient can bring it in whenever and she can assist . Patient understood .

## 2025-07-09 DIAGNOSIS — M19.90 ARTHRITIS: ICD-10-CM

## 2025-07-09 DIAGNOSIS — M17.0 PRIMARY OSTEOARTHRITIS OF BOTH KNEES: ICD-10-CM

## 2025-07-26 DIAGNOSIS — R79.89 ABNORMAL TSH: ICD-10-CM

## 2025-07-28 DIAGNOSIS — E78.2 MIXED HYPERLIPIDEMIA: ICD-10-CM

## 2025-07-28 RX ORDER — LEVOTHYROXINE SODIUM 25 MCG
25 TABLET ORAL DAILY
Qty: 90 TABLET | Refills: 0 | Status: SHIPPED | OUTPATIENT
Start: 2025-07-28

## 2025-07-29 RX ORDER — EZETIMIBE/SIMVASTATIN 10 MG-20MG
TABLET ORAL
Qty: 30 TABLET | Refills: 1 | Status: SHIPPED | OUTPATIENT
Start: 2025-07-29 | End: 2025-08-04 | Stop reason: SDUPTHER

## 2025-08-04 ENCOUNTER — TELEPHONE (OUTPATIENT)
Age: 89
End: 2025-08-04

## 2025-08-04 DIAGNOSIS — E78.2 MIXED HYPERLIPIDEMIA: ICD-10-CM

## 2025-08-04 RX ORDER — EZETIMIBE/SIMVASTATIN 10 MG-20MG
1 TABLET ORAL
Qty: 90 TABLET | Refills: 1 | Status: SHIPPED | OUTPATIENT
Start: 2025-08-04 | End: 2025-08-08 | Stop reason: SDUPTHER

## 2025-08-06 DIAGNOSIS — M17.0 PRIMARY OSTEOARTHRITIS OF BOTH KNEES: ICD-10-CM

## 2025-08-06 DIAGNOSIS — M19.90 ARTHRITIS: ICD-10-CM

## 2025-08-08 DIAGNOSIS — E78.2 MIXED HYPERLIPIDEMIA: ICD-10-CM

## 2025-08-08 RX ORDER — EZETIMIBE/SIMVASTATIN 10 MG-20MG
1 TABLET ORAL
Qty: 90 TABLET | Refills: 1 | Status: SHIPPED | OUTPATIENT
Start: 2025-08-08

## 2025-08-18 DIAGNOSIS — N95.2 VAGINAL ATROPHY: ICD-10-CM

## 2025-08-18 DIAGNOSIS — I10 ESSENTIAL HYPERTENSION: ICD-10-CM

## 2025-08-18 DIAGNOSIS — R79.89 ABNORMAL TSH: ICD-10-CM

## 2025-08-20 DIAGNOSIS — R23.2 HOT FLASHES: ICD-10-CM

## 2025-08-20 RX ORDER — AMLODIPINE BESYLATE 5 MG
5 TABLET ORAL DAILY
Qty: 90 TABLET | Refills: 1 | Status: SHIPPED | OUTPATIENT
Start: 2025-08-20

## 2025-08-20 RX ORDER — LEVOTHYROXINE SODIUM 25 MCG
25 TABLET ORAL DAILY
Qty: 90 TABLET | Refills: 0 | Status: SHIPPED | OUTPATIENT
Start: 2025-08-20

## 2025-08-20 RX ORDER — METOPROLOL SUCCINATE 50 MG
50 TABLET, EXTENDED RELEASE 24 HR ORAL DAILY
Qty: 90 TABLET | Refills: 1 | Status: SHIPPED | OUTPATIENT
Start: 2025-08-20

## 2025-08-21 RX ORDER — ESTRADIOL 0.1 MG/G
CREAM VAGINAL
Qty: 42.5 G | Refills: 2 | Status: SHIPPED | OUTPATIENT
Start: 2025-08-21